# Patient Record
Sex: FEMALE | Race: BLACK OR AFRICAN AMERICAN | NOT HISPANIC OR LATINO
[De-identification: names, ages, dates, MRNs, and addresses within clinical notes are randomized per-mention and may not be internally consistent; named-entity substitution may affect disease eponyms.]

---

## 2019-09-24 ENCOUNTER — RESULT REVIEW (OUTPATIENT)
Age: 69
End: 2019-09-24

## 2020-01-02 ENCOUNTER — EMERGENCY (EMERGENCY)
Facility: HOSPITAL | Age: 70
LOS: 1 days | Discharge: HOME | End: 2020-01-02
Payer: MEDICARE

## 2020-01-02 VITALS
TEMPERATURE: 99 F | SYSTOLIC BLOOD PRESSURE: 152 MMHG | RESPIRATION RATE: 18 BRPM | DIASTOLIC BLOOD PRESSURE: 93 MMHG | OXYGEN SATURATION: 99 %

## 2020-01-02 VITALS
OXYGEN SATURATION: 100 % | TEMPERATURE: 98 F | DIASTOLIC BLOOD PRESSURE: 68 MMHG | HEART RATE: 102 BPM | RESPIRATION RATE: 18 BRPM | SYSTOLIC BLOOD PRESSURE: 159 MMHG

## 2020-01-02 LAB
ALBUMIN SERPL ELPH-MCNC: 4.5 G/DL — SIGNIFICANT CHANGE UP (ref 3.3–5)
ALP SERPL-CCNC: 122 U/L — HIGH (ref 40–120)
ALT FLD-CCNC: 20 U/L — SIGNIFICANT CHANGE UP (ref 4–33)
ANION GAP SERPL CALC-SCNC: 10 MMO/L — SIGNIFICANT CHANGE UP (ref 7–14)
APTT BLD: 39.5 SEC — HIGH (ref 27.5–36.3)
AST SERPL-CCNC: 25 U/L — SIGNIFICANT CHANGE UP (ref 4–32)
BASOPHILS # BLD AUTO: 0.01 K/UL — SIGNIFICANT CHANGE UP (ref 0–0.2)
BASOPHILS NFR BLD AUTO: 0.2 % — SIGNIFICANT CHANGE UP (ref 0–2)
BILIRUB SERPL-MCNC: 0.3 MG/DL — SIGNIFICANT CHANGE UP (ref 0.2–1.2)
BLD GP AB SCN SERPL QL: NEGATIVE — SIGNIFICANT CHANGE UP
BUN SERPL-MCNC: 8 MG/DL — SIGNIFICANT CHANGE UP (ref 7–23)
CALCIUM SERPL-MCNC: 10.3 MG/DL — SIGNIFICANT CHANGE UP (ref 8.4–10.5)
CHLORIDE SERPL-SCNC: 106 MMOL/L — SIGNIFICANT CHANGE UP (ref 98–107)
CO2 SERPL-SCNC: 23 MMOL/L — SIGNIFICANT CHANGE UP (ref 22–31)
CREAT SERPL-MCNC: 0.64 MG/DL — SIGNIFICANT CHANGE UP (ref 0.5–1.3)
EOSINOPHIL # BLD AUTO: 0.33 K/UL — SIGNIFICANT CHANGE UP (ref 0–0.5)
EOSINOPHIL NFR BLD AUTO: 5.2 % — SIGNIFICANT CHANGE UP (ref 0–6)
GLUCOSE SERPL-MCNC: 86 MG/DL — SIGNIFICANT CHANGE UP (ref 70–99)
HCT VFR BLD CALC: 36.3 % — SIGNIFICANT CHANGE UP (ref 34.5–45)
HGB BLD-MCNC: 11.6 G/DL — SIGNIFICANT CHANGE UP (ref 11.5–15.5)
IMM GRANULOCYTES NFR BLD AUTO: 0.3 % — SIGNIFICANT CHANGE UP (ref 0–1.5)
INR BLD: 1.14 — SIGNIFICANT CHANGE UP (ref 0.88–1.17)
LYMPHOCYTES # BLD AUTO: 1.13 K/UL — SIGNIFICANT CHANGE UP (ref 1–3.3)
LYMPHOCYTES # BLD AUTO: 17.7 % — SIGNIFICANT CHANGE UP (ref 13–44)
MCHC RBC-ENTMCNC: 28.9 PG — SIGNIFICANT CHANGE UP (ref 27–34)
MCHC RBC-ENTMCNC: 32 % — SIGNIFICANT CHANGE UP (ref 32–36)
MCV RBC AUTO: 90.5 FL — SIGNIFICANT CHANGE UP (ref 80–100)
MONOCYTES # BLD AUTO: 0.77 K/UL — SIGNIFICANT CHANGE UP (ref 0–0.9)
MONOCYTES NFR BLD AUTO: 12.1 % — SIGNIFICANT CHANGE UP (ref 2–14)
NEUTROPHILS # BLD AUTO: 4.12 K/UL — SIGNIFICANT CHANGE UP (ref 1.8–7.4)
NEUTROPHILS NFR BLD AUTO: 64.5 % — SIGNIFICANT CHANGE UP (ref 43–77)
NRBC # FLD: 0 K/UL — SIGNIFICANT CHANGE UP (ref 0–0)
PLATELET # BLD AUTO: 273 K/UL — SIGNIFICANT CHANGE UP (ref 150–400)
PMV BLD: 11.5 FL — SIGNIFICANT CHANGE UP (ref 7–13)
POTASSIUM SERPL-MCNC: 3.6 MMOL/L — SIGNIFICANT CHANGE UP (ref 3.5–5.3)
POTASSIUM SERPL-SCNC: 3.6 MMOL/L — SIGNIFICANT CHANGE UP (ref 3.5–5.3)
PROT SERPL-MCNC: 7.2 G/DL — SIGNIFICANT CHANGE UP (ref 6–8.3)
PROTHROM AB SERPL-ACNC: 13 SEC — SIGNIFICANT CHANGE UP (ref 9.8–13.1)
RBC # BLD: 4.01 M/UL — SIGNIFICANT CHANGE UP (ref 3.8–5.2)
RBC # FLD: 13.7 % — SIGNIFICANT CHANGE UP (ref 10.3–14.5)
RH IG SCN BLD-IMP: POSITIVE — SIGNIFICANT CHANGE UP
SODIUM SERPL-SCNC: 139 MMOL/L — SIGNIFICANT CHANGE UP (ref 135–145)
WBC # BLD: 6.38 K/UL — SIGNIFICANT CHANGE UP (ref 3.8–10.5)
WBC # FLD AUTO: 6.38 K/UL — SIGNIFICANT CHANGE UP (ref 3.8–10.5)

## 2020-01-02 PROCEDURE — 99283 EMERGENCY DEPT VISIT LOW MDM: CPT | Mod: GC

## 2020-01-02 NOTE — ED PROVIDER NOTE - CLINICAL SUMMARY MEDICAL DECISION MAKING FREE TEXT BOX
68 y/o F presents with bleeding from tongue x 5 days. patient is hemodynamically stable. plan is for routine labs and ENT consult

## 2020-01-02 NOTE — ED PROVIDER NOTE - ENMT, MLM
tongue: small abrasion to midline of the posterior aspect of tongue. actively bleeding. no other lesions noted. Airway patent, Nasal mucosa clear. Mouth with normal mucosa. Throat has no vesicles, no oropharyngeal exudates and uvula is midline.

## 2020-01-02 NOTE — ED PROVIDER NOTE - PATIENT PORTAL LINK FT
You can access the FollowMyHealth Patient Portal offered by [facility] by registering at the following website: [email or web address]. By joining [email or web address]’s FollowMyHealth portal, you will also be able to view your health information using other applications (apps) compatible with our system.

## 2020-01-02 NOTE — CONSULT NOTE ADULT - SUBJECTIVE AND OBJECTIVE BOX
HPI:  Patient is a 69y Female with PMH significant for *** who p/w ***.     h/h 11.6/36.3  Plt 273  PT 13.0  INR 1.14  PTT 39.5    Physical Exam  T(C): 37 (01-02-20 @ 20:58), Max: 37 (01-02-20 @ 20:58)  HR: 102 (01-02-20 @ 15:52) (102 - 102)  BP: 152/93 (01-02-20 @ 20:58) (152/93 - 159/68)  RR: 18 (01-02-20 @ 20:58) (18 - 18)  SpO2: 99% (01-02-20 @ 20:58) (99% - 100%)  General: NAD, A+Ox3  No respiratory distress, stridor, or stertor  Voice quality: normal  Face:  Symmetric without masses or lesions  OU: PERRL, EOMI  AD: Pinna wnl, EAC clear, TM intact, no effusion  AS: Pinna wnl, EAC clear, TM intact, no effusion  Nose: nasal cavity clear bilaterally, inferior turbinates normal, mucosa normal without crusting or bleeding  OC/OP: tongue normal, floor of mouth wnl, no masses or lesions, OP clear  Neck: soft/flat, no LAD  CNII-XII intact    Procedure: Flexible laryngoscopy    Pre-procedure diagnosis/Indication for procedure: To evaluate larynx    Anesthesia: None    Description:    A flexible endoscope was used to examine the left and right nasal cavities, posterior oropharynx, hypopharynx, and larynx. The nasal valve areas were examined for abnormalities or collapse. The inferior and middle turbinates were evaluated. The middle and superior meatuses, the sphenoethmoid recesses, and the nasopharynx were examined and inspected for mucopurulence, polyps, and nasal masses. The oropharynx, tongue base/vallecula, epiglottis, aryepiglottic folds, arytenoids, vocal cords, hypopharynx were also inspected for swelling, inflammation, or dysfunction. The patient tolerated the procedure without complications.    Findings:    NP wnl  BOT/vallecula normal  Epiglottis sharp  AE folds nonedematous  Arytenoids mobile  Vocal folds mobile bilaterally  No masses or lesions visualized in post cricoid space or pyriform sinuses bilaterally HPI:  Patient is a 69y Female with PMH significant for HTN, colitis who p/w bleeding from tongue x 5 days. Was seen by Dr. Mendiola who sent her to ED. States bleeding started after brushing her tongue very hard. States bleeding comes and goes, but sometimes she spits up clots. Denies any additional symptoms. No weight loss, no fever/chills. No hx of smoking or drinking. Was taking ASA but discontinued 2 weeks ago. No hx of bleeding disorder. No respiratory distress, stridor or stertor. No dysphagia.     h/h 11.6/36.3  Plt 273  PT 13.0  INR 1.14  PTT 39.5    Physical Exam  T(C): 37 (01-02-20 @ 20:58), Max: 37 (01-02-20 @ 20:58)  HR: 102 (01-02-20 @ 15:52) (102 - 102)  BP: 152/93 (01-02-20 @ 20:58) (152/93 - 159/68)  RR: 18 (01-02-20 @ 20:58) (18 - 18)  SpO2: 99% (01-02-20 @ 20:58) (99% - 100%)  General: NAD, A+Ox3  No respiratory distress, stridor, or stertor  Voice quality: normal  Face:  Symmetric without masses or lesions  OU: PERRL, EOMI  Nose: nasal cavity clear bilaterally, inferior turbinates normal, mucosa normal without crusting or bleeding  OC/OP: tongue with 2-3mm lesion in midline of posterior tongue, prominent vasculature, violaceous, intermittently oozing, no other lesions of tongue, BOT soft,  floor of mouth wnl, no masses or lesions, OP clear  Neck: soft/flat, no LAD  CNII-XII intact    Procedure: Flexible laryngoscopy    Pre-procedure diagnosis/Indication for procedure: To evaluate larynx    Anesthesia: None    Description:    A flexible endoscope was used to examine the left and right nasal cavities, posterior oropharynx, hypopharynx, and larynx. The nasal valve areas were examined for abnormalities or collapse. The inferior and middle turbinates were evaluated. The middle and superior meatuses, the sphenoethmoid recesses, and the nasopharynx were examined and inspected for mucopurulence, polyps, and nasal masses. The oropharynx, tongue base/vallecula, epiglottis, aryepiglottic folds, arytenoids, vocal cords, hypopharynx were also inspected for swelling, inflammation, or dysfunction. The patient tolerated the procedure without complications.    Findings:  NP wnl  BOT/vallecula normal  Epiglottis sharp  AE folds nonedematous  Arytenoids mobile  Vocal folds mobile bilaterally  No masses or lesions visualized in post cricoid space or pyriform sinuses bilaterally  No additional bleeding noted    PROCEDURE:    Wearing a headlight for visualization, lesion on tongue was cauterized with silver nitrate. Patient was observed for 20 minutes and re-examined. No additional bleeding appreciated. Tolerated procedure well.       A/P: 69 F with bleeding from tongue x 5 day, bleeding violaceous small lesions noted now s/p cautery with silver nitrate. No further bleeding  - No further ORL intervention  - Soft diet x 1 week  - Avoid rigorous brushing of tongue   - Return to ED with bleeding or other concerning symptoms such as respiratory distress  - F/u in clinic, ENT will call patient with appointment date and time, Patient contact [phone]  - d/w dr. montoya

## 2020-01-02 NOTE — ED PROVIDER NOTE - NSFOLLOWUPINSTRUCTIONS_ED_ALL_ED_FT
Return to ED with bleeding or other concerning symptoms such as respiratory distress  - F/u in clinic, ENT will call patient with appointment date and time, Patient contact [phone]

## 2020-01-06 ENCOUNTER — APPOINTMENT (OUTPATIENT)
Dept: OTOLARYNGOLOGY | Facility: CLINIC | Age: 70
End: 2020-01-06
Payer: MEDICARE

## 2020-01-06 VITALS
SYSTOLIC BLOOD PRESSURE: 161 MMHG | HEART RATE: 96 BPM | BODY MASS INDEX: 33.49 KG/M2 | HEIGHT: 65 IN | DIASTOLIC BLOOD PRESSURE: 87 MMHG | WEIGHT: 201 LBS

## 2020-01-06 PROCEDURE — 99204 OFFICE O/P NEW MOD 45 MIN: CPT | Mod: 25

## 2020-01-06 PROCEDURE — 31575 DIAGNOSTIC LARYNGOSCOPY: CPT

## 2020-01-06 RX ORDER — OMEPRAZOLE 20 MG/1
TABLET, DELAYED RELEASE ORAL
Refills: 0 | Status: ACTIVE | COMMUNITY

## 2020-01-06 RX ORDER — MELOXICAM 15 MG/1
TABLET ORAL
Refills: 0 | Status: ACTIVE | COMMUNITY

## 2020-01-06 RX ORDER — MIRABEGRON 50 MG/1
TABLET, FILM COATED, EXTENDED RELEASE ORAL
Refills: 0 | Status: ACTIVE | COMMUNITY

## 2020-01-06 RX ORDER — CHOLECALCIFEROL (VITAMIN D3) 25 MCG
TABLET ORAL
Refills: 0 | Status: ACTIVE | COMMUNITY

## 2020-01-06 RX ORDER — FUROSEMIDE 80 MG/1
TABLET ORAL
Refills: 0 | Status: ACTIVE | COMMUNITY

## 2020-01-06 NOTE — PHYSICAL EXAM
[Midline] : trachea located in midline position [Laryngoscopy Performed] : laryngoscopy was performed, see procedure section for findings [Normal] : no rashes [de-identified] : 4mm midline violaceous posterior tongue lesion, appears to be healing, no bleeding, no underlying mass on palpation, intact tongue mobility [de-identified] : poor dentition [de-identified] : no cyanosis

## 2020-01-06 NOTE — PROCEDURE
[Complicated Symptoms] : complicated symptoms requiring more thorough examination than provided by mirror [Topical Lidocaine] : topical lidocaine [Oxymetazoline HCl] : oxymetazoline HCl [Flexible Endoscope] : examined with the flexible endoscope [Normal] : the false vocal folds were pink and regular, the ventricular sulcus was open, the true vocal folds were glistening white, tense and of equal length, mobility, and height

## 2020-01-06 NOTE — HISTORY OF PRESENT ILLNESS
[de-identified] : 69F seen in the ED 1/2/19 for bleeding from tongue here for follow-up. In the ED, patient reported bleeding from tongue x5 days which had started after brushing her tongue too vigorously. she was evaluated and noted to have a tongue lesion with prominent vasculature which was cauterized with silver nitrate. Since was seen in the ED has had more bleeding daily, sometimes several times a day. Estimates about a teaspoon of blood comes out when she bleeds. Was taking ASA but discontinued > 2 weeks ago. No hx of bleeding disorder. No respiratory distress, stridor or stertor. No dysphagia, odynophagia, otalgia.

## 2020-01-06 NOTE — REASON FOR VISIT
[Initial Evaluation] : an initial evaluation for [Spouse] : spouse [FreeTextEntry2] : Initial visit for tongue laceration with excessive bleeding, went to [facility] ER, unsuccessful cauterization, occurrence for the past week, started on Omeprazole, currently not eating, liquid diet.

## 2020-01-27 ENCOUNTER — APPOINTMENT (OUTPATIENT)
Dept: OTOLARYNGOLOGY | Facility: CLINIC | Age: 70
End: 2020-01-27
Payer: MEDICARE

## 2020-01-27 PROCEDURE — 99213 OFFICE O/P EST LOW 20 MIN: CPT

## 2020-01-27 NOTE — HISTORY OF PRESENT ILLNESS
[de-identified] : 69 year old female follow up for bleeding from tongue.   She reports the bleeding has decreased but still happens every few days.  She reports decreasing her aggressive tongue brushing.  No other acute changes.  No melena, no report of other bleeding problems.

## 2020-01-27 NOTE — PHYSICAL EXAM
[Normal] : assessment of respiratory effort is normal [de-identified] : small central pit with vessel centrally located just anterior to circumvalate papilla

## 2020-02-10 ENCOUNTER — APPOINTMENT (OUTPATIENT)
Dept: OTOLARYNGOLOGY | Facility: CLINIC | Age: 70
End: 2020-02-10
Payer: MEDICARE

## 2020-02-10 VITALS
WEIGHT: 201 LBS | BODY MASS INDEX: 33.49 KG/M2 | SYSTOLIC BLOOD PRESSURE: 148 MMHG | DIASTOLIC BLOOD PRESSURE: 87 MMHG | HEART RATE: 88 BPM | HEIGHT: 65 IN

## 2020-02-10 DIAGNOSIS — K14.8 OTHER DISEASES OF TONGUE: ICD-10-CM

## 2020-02-10 DIAGNOSIS — S01.512A LACERATION W/OUT FOREIGN BODY OF ORAL CAVITY, INITIAL ENCOUNTER: ICD-10-CM

## 2020-02-10 PROCEDURE — 99213 OFFICE O/P EST LOW 20 MIN: CPT

## 2020-02-10 NOTE — PHYSICAL EXAM
[Normal] : parotid gland, submandibular gland and thyroid glands are normal [de-identified] : small central tongue lesion just anterior to circumvallate papilla.  Similar in appearance to prior exam.

## 2020-02-10 NOTE — HISTORY OF PRESENT ILLNESS
[de-identified] : 69 year old female follow up for bleeding from tongue. She reports the bleeding has only happened 3 times since her cautery 2 weeks ago.  She has been trying to avoid brushing tongue and soft foods.  No large volume bleeding.

## 2020-02-10 NOTE — REASON FOR VISIT
[Subsequent Evaluation] : a subsequent evaluation for [Spouse] : spouse [FreeTextEntry2] : follow up for tongue laceration, silver nitrate used during last office visit, electrocautery if needed

## 2020-02-13 ENCOUNTER — APPOINTMENT (OUTPATIENT)
Dept: SURGICAL ONCOLOGY | Facility: CLINIC | Age: 70
End: 2020-02-13

## 2020-02-24 ENCOUNTER — APPOINTMENT (OUTPATIENT)
Dept: OTOLARYNGOLOGY | Facility: CLINIC | Age: 70
End: 2020-02-24
Payer: MEDICARE

## 2020-02-24 DIAGNOSIS — R04.2 HEMOPTYSIS: ICD-10-CM

## 2020-02-24 PROCEDURE — 99213 OFFICE O/P EST LOW 20 MIN: CPT

## 2020-02-24 NOTE — PHYSICAL EXAM
[de-identified] : Midline area of lesion that was previously cauterized was recauterized with AgNO3 no acute bleeding seen [Normal] : no masses and lesions seen, face is symmetric

## 2020-02-24 NOTE — REASON FOR VISIT
[Subsequent Evaluation] : a subsequent evaluation for [Spouse] : spouse [FreeTextEntry2] : tongue laceration

## 2020-03-05 ENCOUNTER — APPOINTMENT (OUTPATIENT)
Dept: SURGICAL ONCOLOGY | Facility: CLINIC | Age: 70
End: 2020-03-05
Payer: MEDICARE

## 2020-03-12 ENCOUNTER — APPOINTMENT (OUTPATIENT)
Dept: OTOLARYNGOLOGY | Facility: CLINIC | Age: 70
End: 2020-03-12
Payer: MEDICARE

## 2020-03-12 VITALS
DIASTOLIC BLOOD PRESSURE: 86 MMHG | SYSTOLIC BLOOD PRESSURE: 129 MMHG | WEIGHT: 125 LBS | BODY MASS INDEX: 20.83 KG/M2 | HEART RATE: 89 BPM | HEIGHT: 65 IN

## 2020-03-12 DIAGNOSIS — K14.8 OTHER DISEASES OF TONGUE: ICD-10-CM

## 2020-03-12 PROCEDURE — 99213 OFFICE O/P EST LOW 20 MIN: CPT

## 2020-03-12 RX ORDER — MESALAMINE 1.2 G/1
TABLET, DELAYED RELEASE ORAL
Refills: 0 | Status: ACTIVE | COMMUNITY

## 2020-03-13 PROBLEM — K14.8 TONGUE LESION: Status: ACTIVE | Noted: 2020-02-10

## 2020-03-13 NOTE — PHYSICAL EXAM
[Normal] : mucosa is normal [de-identified] : Small white scar posterior midline where the tongue was cauterized

## 2020-03-13 NOTE — REASON FOR VISIT
[Subsequent Evaluation] : a subsequent evaluation for [FreeTextEntry2] : follow up for tongue laceration

## 2020-03-13 NOTE — ADDENDUM
[FreeTextEntry1] : I, Vinay Hugo, acted solely as a scribe for Dr. Jono Alatorre on this date, 3/12/2020\par \par All medical record entries made by the Scribe were at my Dr. Alatorre direction and personally dictated by me on 3/12/2020. I have reviewed the chart and agree that the record accurately reflects my personal performance of the history, physical exam, assessment and plan. I have also personally directed, reviewed and agreed with the chart.\par

## 2020-03-13 NOTE — HISTORY OF PRESENT ILLNESS
[de-identified] : 68 y/o female presenting for follow up of tongue bleeding.  Status post cauterization x3 ,states no further bleeding. No further complaints at this time.\par \par

## 2020-03-19 ENCOUNTER — APPOINTMENT (OUTPATIENT)
Dept: SURGICAL ONCOLOGY | Facility: CLINIC | Age: 70
End: 2020-03-19
Payer: MEDICARE

## 2020-03-19 VITALS
TEMPERATURE: 98.3 F | DIASTOLIC BLOOD PRESSURE: 90 MMHG | RESPIRATION RATE: 17 BRPM | OXYGEN SATURATION: 99 % | HEART RATE: 90 BPM | SYSTOLIC BLOOD PRESSURE: 137 MMHG

## 2020-03-19 VITALS — HEIGHT: 65 IN | BODY MASS INDEX: 33.32 KG/M2 | WEIGHT: 200 LBS

## 2020-03-19 DIAGNOSIS — N64.4 MASTODYNIA: ICD-10-CM

## 2020-03-19 DIAGNOSIS — N63.0 UNSPECIFIED LUMP IN UNSPECIFIED BREAST: ICD-10-CM

## 2020-03-19 PROCEDURE — 99204 OFFICE O/P NEW MOD 45 MIN: CPT

## 2020-03-19 RX ORDER — GEMFIBROZIL 600 MG/1
600 TABLET, FILM COATED ORAL
Refills: 0 | Status: ACTIVE | COMMUNITY

## 2020-03-19 NOTE — PHYSICAL EXAM
[Normal] : supple, no neck mass and thyroid not enlarged [Normal Neck Lymph Nodes] : normal neck lymph nodes  [Normal Supraclavicular Lymph Nodes] : normal supraclavicular lymph nodes [Normal Groin Lymph Nodes] : normal groin lymph nodes [Normal Axillary Lymph Nodes] : normal axillary lymph nodes [Normal] : oriented to person, place and time, with appropriate affect [de-identified] : on visual inspection left breast is largeer than right. both breasts are pendulous with evidence of macromastia. on exam no dominant masses appreciated. there is an upper outer quadrant stellate scar in the skin of the left breast

## 2020-03-19 NOTE — ASSESSMENT
[FreeTextEntry1] : 69 year old female presents with left mastodynia, left breast swelling on top of bilateral macromastia. I believe she is symptomatic from her macromastia as her pain includes shoulder, chest wall, neck, and back pain. However given her recent breast swelling I believe a mammogram and sonogram is indicated now. Barring any concerning pathology on breast imaging I will refer her to plastic surgery for consideration of breast reduction surgery given her symptomatic macromastia. \par \par \par Plan: Mammo/sono now\par         PRS referral

## 2020-03-19 NOTE — REASON FOR VISIT
[Initial Consultation] : an initial consultation for [Mastodynia] : mastodynia [FreeTextEntry2] : and macromastia
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
## 2020-03-24 ENCOUNTER — RESULT REVIEW (OUTPATIENT)
Age: 70
End: 2020-03-24

## 2020-03-24 ENCOUNTER — APPOINTMENT (OUTPATIENT)
Dept: MAMMOGRAPHY | Facility: CLINIC | Age: 70
End: 2020-03-24
Payer: MEDICARE

## 2020-03-24 ENCOUNTER — OUTPATIENT (OUTPATIENT)
Dept: OUTPATIENT SERVICES | Facility: HOSPITAL | Age: 70
LOS: 1 days | End: 2020-03-24
Payer: MEDICARE

## 2020-03-24 ENCOUNTER — APPOINTMENT (OUTPATIENT)
Dept: ULTRASOUND IMAGING | Facility: CLINIC | Age: 70
End: 2020-03-24
Payer: MEDICARE

## 2020-03-24 DIAGNOSIS — N63.0 UNSPECIFIED LUMP IN UNSPECIFIED BREAST: ICD-10-CM

## 2020-03-24 PROCEDURE — G0279: CPT | Mod: 26

## 2020-03-24 PROCEDURE — 77065 DX MAMMO INCL CAD UNI: CPT

## 2020-03-24 PROCEDURE — 76641 ULTRASOUND BREAST COMPLETE: CPT | Mod: 26,LT

## 2020-03-24 PROCEDURE — 76641 ULTRASOUND BREAST COMPLETE: CPT

## 2020-03-24 PROCEDURE — 77065 DX MAMMO INCL CAD UNI: CPT | Mod: 26,LT

## 2020-03-24 PROCEDURE — G0279: CPT

## 2020-04-21 ENCOUNTER — APPOINTMENT (OUTPATIENT)
Dept: OTOLARYNGOLOGY | Facility: CLINIC | Age: 70
End: 2020-04-21

## 2020-06-18 ENCOUNTER — APPOINTMENT (OUTPATIENT)
Dept: OTOLARYNGOLOGY | Facility: CLINIC | Age: 70
End: 2020-06-18

## 2020-08-18 ENCOUNTER — APPOINTMENT (OUTPATIENT)
Dept: PLASTIC SURGERY | Facility: CLINIC | Age: 70
End: 2020-08-18
Payer: MEDICARE

## 2020-08-18 VITALS
BODY MASS INDEX: 33.66 KG/M2 | DIASTOLIC BLOOD PRESSURE: 78 MMHG | SYSTOLIC BLOOD PRESSURE: 108 MMHG | HEART RATE: 86 BPM | HEIGHT: 65 IN | TEMPERATURE: 97.7 F | OXYGEN SATURATION: 99 % | WEIGHT: 202 LBS

## 2020-08-18 DIAGNOSIS — Z87.19 PERSONAL HISTORY OF OTHER DISEASES OF THE DIGESTIVE SYSTEM: ICD-10-CM

## 2020-08-18 DIAGNOSIS — Z86.39 PERSONAL HISTORY OF OTHER ENDOCRINE, NUTRITIONAL AND METABOLIC DISEASE: ICD-10-CM

## 2020-08-18 DIAGNOSIS — Z86.79 PERSONAL HISTORY OF OTHER DISEASES OF THE CIRCULATORY SYSTEM: ICD-10-CM

## 2020-08-18 PROCEDURE — 99203 OFFICE O/P NEW LOW 30 MIN: CPT

## 2020-08-19 PROBLEM — Z86.39 HISTORY OF HIGH CHOLESTEROL: Status: RESOLVED | Noted: 2020-01-06 | Resolved: 2020-08-19

## 2020-08-19 PROBLEM — Z87.19 HISTORY OF ULCERATIVE COLITIS: Status: RESOLVED | Noted: 2020-01-06 | Resolved: 2020-08-19

## 2020-08-21 NOTE — HISTORY OF PRESENT ILLNESS
[FreeTextEntry1] : 70 years old pt is here referred by Dr. Tovar for breast reduction consultation. pt reports current bra size is 40 DDD. pt c/o back pain, neck, shoulder pain, and darken pigmentation of IMF but denies any rash development or tension headaches. pt reports pain level 7/10, takes Tylenol for pain.\par \par Most recent b/l MMG from June 2019 was without evidence of malignancy (Birads 2). She later underwent a b/l breast US July 2019 which was also with Birads 2 findings. No cystic or solid lesions were noted at the time of imaging.\par \par pt denies any personal or family Hx of breast cancer.  \par pt denies any cystic or dense breast tissue.\par  pt also denies any s/s of breast pain, nipple discharge, nipple inversion or palpable mass. \par  pt also denies any Hx of blood clotting issues or miscarriages. \par \par

## 2020-08-21 NOTE — PHYSICAL EXAM
[NI] : Normal [Bra Size: _______] : Bra Size: [unfilled] [de-identified] : visual inspection left breast is larger than right. both breasts are pendulous with evidence of macromastia. on exam no dominant masses appreciated. there is an upper outer quadrant stellate scar in the skin of the left breast. \par SNL: 34 (R), 37 (L)\par BW: 15.5 (R), 17 (R)\par N:IMF:  17.5 (R), 15.5 (L)\par Areolar:  6.5 (R), 6.5 (L)

## 2020-12-26 ENCOUNTER — INPATIENT (INPATIENT)
Facility: HOSPITAL | Age: 70
LOS: 4 days | Discharge: HOME | End: 2020-12-31
Attending: HOSPITALIST | Admitting: HOSPITALIST
Payer: MEDICARE

## 2020-12-26 VITALS
HEART RATE: 95 BPM | OXYGEN SATURATION: 100 % | RESPIRATION RATE: 16 BRPM | TEMPERATURE: 98 F | SYSTOLIC BLOOD PRESSURE: 167 MMHG | DIASTOLIC BLOOD PRESSURE: 102 MMHG

## 2020-12-26 DIAGNOSIS — R27.0 ATAXIA, UNSPECIFIED: ICD-10-CM

## 2020-12-26 LAB
APPEARANCE UR: CLEAR — SIGNIFICANT CHANGE UP
B PERT DNA SPEC QL NAA+PROBE: SIGNIFICANT CHANGE UP
BASOPHILS # BLD AUTO: 0.02 K/UL — SIGNIFICANT CHANGE UP (ref 0–0.2)
BASOPHILS NFR BLD AUTO: 0.3 % — SIGNIFICANT CHANGE UP (ref 0–2)
BILIRUB UR-MCNC: NEGATIVE — SIGNIFICANT CHANGE UP
C PNEUM DNA SPEC QL NAA+PROBE: SIGNIFICANT CHANGE UP
COLOR SPEC: COLORLESS — SIGNIFICANT CHANGE UP
DIFF PNL FLD: NEGATIVE — SIGNIFICANT CHANGE UP
EOSINOPHIL # BLD AUTO: 0.14 K/UL — SIGNIFICANT CHANGE UP (ref 0–0.5)
EOSINOPHIL NFR BLD AUTO: 2.3 % — SIGNIFICANT CHANGE UP (ref 0–6)
FLUAV H1 2009 PAND RNA SPEC QL NAA+PROBE: SIGNIFICANT CHANGE UP
FLUAV H1 RNA SPEC QL NAA+PROBE: SIGNIFICANT CHANGE UP
FLUAV H3 RNA SPEC QL NAA+PROBE: SIGNIFICANT CHANGE UP
FLUAV SUBTYP SPEC NAA+PROBE: SIGNIFICANT CHANGE UP
FLUBV RNA SPEC QL NAA+PROBE: SIGNIFICANT CHANGE UP
GLUCOSE UR QL: NEGATIVE — SIGNIFICANT CHANGE UP
HADV DNA SPEC QL NAA+PROBE: SIGNIFICANT CHANGE UP
HCOV PNL SPEC NAA+PROBE: SIGNIFICANT CHANGE UP
HCT VFR BLD CALC: 37.2 % — SIGNIFICANT CHANGE UP (ref 34.5–45)
HGB BLD-MCNC: 11.5 G/DL — SIGNIFICANT CHANGE UP (ref 11.5–15.5)
HMPV RNA SPEC QL NAA+PROBE: SIGNIFICANT CHANGE UP
HPIV1 RNA SPEC QL NAA+PROBE: SIGNIFICANT CHANGE UP
HPIV2 RNA SPEC QL NAA+PROBE: SIGNIFICANT CHANGE UP
HPIV3 RNA SPEC QL NAA+PROBE: SIGNIFICANT CHANGE UP
HPIV4 RNA SPEC QL NAA+PROBE: SIGNIFICANT CHANGE UP
IANC: 3.68 K/UL — SIGNIFICANT CHANGE UP (ref 1.5–8.5)
IMM GRANULOCYTES NFR BLD AUTO: 0.3 % — SIGNIFICANT CHANGE UP (ref 0–1.5)
KETONES UR-MCNC: NEGATIVE — SIGNIFICANT CHANGE UP
LEUKOCYTE ESTERASE UR-ACNC: NEGATIVE — SIGNIFICANT CHANGE UP
LYMPHOCYTES # BLD AUTO: 1.85 K/UL — SIGNIFICANT CHANGE UP (ref 1–3.3)
LYMPHOCYTES # BLD AUTO: 29.9 % — SIGNIFICANT CHANGE UP (ref 13–44)
MCHC RBC-ENTMCNC: 28.2 PG — SIGNIFICANT CHANGE UP (ref 27–34)
MCHC RBC-ENTMCNC: 30.9 GM/DL — LOW (ref 32–36)
MCV RBC AUTO: 91.2 FL — SIGNIFICANT CHANGE UP (ref 80–100)
MONOCYTES # BLD AUTO: 0.47 K/UL — SIGNIFICANT CHANGE UP (ref 0–0.9)
MONOCYTES NFR BLD AUTO: 7.6 % — SIGNIFICANT CHANGE UP (ref 2–14)
NEUTROPHILS # BLD AUTO: 3.68 K/UL — SIGNIFICANT CHANGE UP (ref 1.8–7.4)
NEUTROPHILS NFR BLD AUTO: 59.6 % — SIGNIFICANT CHANGE UP (ref 43–77)
NITRITE UR-MCNC: NEGATIVE — SIGNIFICANT CHANGE UP
NRBC # BLD: 0 /100 WBCS — SIGNIFICANT CHANGE UP
NRBC # FLD: 0 K/UL — SIGNIFICANT CHANGE UP
PH UR: 7 — SIGNIFICANT CHANGE UP (ref 5–8)
PLATELET # BLD AUTO: 211 K/UL — SIGNIFICANT CHANGE UP (ref 150–400)
PROT UR-MCNC: NEGATIVE — SIGNIFICANT CHANGE UP
RAPID RVP RESULT: SIGNIFICANT CHANGE UP
RBC # BLD: 4.08 M/UL — SIGNIFICANT CHANGE UP (ref 3.8–5.2)
RBC # FLD: 15.9 % — HIGH (ref 10.3–14.5)
RSV RNA SPEC QL NAA+PROBE: SIGNIFICANT CHANGE UP
RV+EV RNA SPEC QL NAA+PROBE: SIGNIFICANT CHANGE UP
SARS-COV-2 RNA SPEC QL NAA+PROBE: SIGNIFICANT CHANGE UP
SP GR SPEC: 1.01 — SIGNIFICANT CHANGE UP (ref 1.01–1.02)
UROBILINOGEN FLD QL: SIGNIFICANT CHANGE UP
WBC # BLD: 6.18 K/UL — SIGNIFICANT CHANGE UP (ref 3.8–10.5)
WBC # FLD AUTO: 6.18 K/UL — SIGNIFICANT CHANGE UP (ref 3.8–10.5)

## 2020-12-26 PROCEDURE — 73590 X-RAY EXAM OF LOWER LEG: CPT | Mod: 26,RT

## 2020-12-26 PROCEDURE — 73600 X-RAY EXAM OF ANKLE: CPT | Mod: 26,RT

## 2020-12-26 PROCEDURE — 99285 EMERGENCY DEPT VISIT HI MDM: CPT

## 2020-12-26 PROCEDURE — 70496 CT ANGIOGRAPHY HEAD: CPT | Mod: 26

## 2020-12-26 PROCEDURE — 70498 CT ANGIOGRAPHY NECK: CPT | Mod: 26

## 2020-12-26 NOTE — ED ADULT NURSE NOTE - CHIEF COMPLAINT QUOTE
pt ambulatory to triage with steady gait,  states on Monday morning she woke up "could not stand and was loosing her coordination". As per  pt has also been more forgetful since Monday. Pt AxOx3 in triage. Pt denies CP, SOB, H/A, visual changes, fever.  can be reached at [phone].

## 2020-12-26 NOTE — CONSULT NOTE ADULT - ASSESSMENT
69yo woman with PMH of HTN and colitis presented to the ED after an episode of generalized shaking with persistent confusion for the past 6 days. Patient's  was at bedside to provide collateral information. Patient acknowledges forgetfulness and confusion, in addition to a mild bifrontal headache and increased urinary frequency. Physical exam remarkable for LUE dysmetria, ataxia on heel to shin, and wide-based unsteady gait. Labs remarkable for borderline hypoglycemia. Imaging pending.    Impression: Generalized shaking and poor coordination possibly due R cerebellar dysfunction from ischemic CVA of unknown etiology vs. seizure activity vs. NPH in the setting of urinary frequency.    Recommendations:  [] permissive HTN for 24 hours up to 220/110  [] gradual normotension after 24 hrs  [] telemetry monitoring  [] CTH w/o contrast  [] STAT repeat CTH if change in neuro status  [] 24h vEEG  [] start ASA 81 daily  [] Start atorva 80 mg daily, titrate to LDL <70  [] DVT ppx  [] TTE with bubble  [] CBC, BMP  [] UA/UCx  [] Lipid panel  [] HbA1C  [] BG   [] DVT ppx  [] PT/OT  [] dysphagia screen  [] Fall precautions      Case to be seen and discussed with neurology attending Dr. Jiménez. 71yo woman with PMH of HTN and colitis presented to the ED after an episode of generalized shaking with persistent confusion for the past 6 days. Patient's  was at bedside to provide collateral information. Patient acknowledges forgetfulness and confusion, in addition to a mild bifrontal headache and increased urinary frequency. Physical exam remarkable for slowed cognition, LUE dysmetria, ataxia on heel to shin, and wide-based unsteady gait. Labs remarkable for borderline hypoglycemia. Imaging pending.    Impression: Generalized shaking and poor coordination possibly due R cerebellar dysfunction from ischemic CVA of unknown etiology vs. seizure activity vs. NPH in the setting of urinary frequency.    Recommendations:  [] permissive HTN for 24 hours up to 220/110  [] gradual normotension after 24 hrs  [] telemetry monitoring  [] CTH w/o contrast  [] STAT repeat CTH if change in neuro status  [] 24h vEEG  [] start ASA 81 daily  [] Start atorva 80 mg daily, titrate to LDL <70  [] DVT ppx  [] TTE with bubble  [] CBC, BMP  [] UA/UCx  [] Lipid panel  [] HbA1C  [] BG   [] DVT ppx  [] PT/OT  [] dysphagia screen  [] Fall precautions      Case to be seen and discussed with neurology attending Dr. Jiménez. 71yo woman with PMH of HTN and colitis presented to the ED after an episode of generalized shaking with persistent confusion for the past 6 days. Patient's  was at bedside to provide collateral information. Patient acknowledges forgetfulness and confusion, in addition to a mild bifrontal headache and increased urinary frequency. Physical exam remarkable for slowed cognition, LUE dysmetria, ataxia on heel to shin, and wide-based unsteady gait. Labs remarkable for borderline hypoglycemia. Imaging pending.    Impression: Generalized shaking and poor coordination possibly due R cerebellar dysfunction from ischemic CVA of unknown etiology vs. seizure activity vs. NPH in the setting of urinary frequency.    Recommendations:  [] permissive HTN for 24 hours up to 220/110  [] gradual normotension after 24 hrs  [] telemetry monitoring  [] CTH w/o contrast  [] MRI brain w/o contrast if no contraindication from prior L hand surgery  [] STAT repeat CTH if change in neuro status  [] 24h vEEG  [] start ASA 81 daily  [] Start atorva 80 mg daily, titrate to LDL <70  [] DVT ppx  [] TTE with bubble  [] CBC, BMP  [] UA/UCx  [] Lipid panel  [] HbA1C  [] BG   [] DVT ppx  [] PT/OT  [] dysphagia screen  [] Fall precautions      Case to be seen and discussed with neurology attending Dr. Jiménez.

## 2020-12-26 NOTE — ED ADULT NURSE NOTE - NSIMPLEMENTINTERV_GEN_ALL_ED
Implemented All Fall with Harm Risk Interventions:  Alligator to call system. Call bell, personal items and telephone within reach. Instruct patient to call for assistance. Room bathroom lighting operational. Non-slip footwear when patient is off stretcher. Physically safe environment: no spills, clutter or unnecessary equipment. Stretcher in lowest position, wheels locked, appropriate side rails in place. Provide visual cue, wrist band, yellow gown, etc. Monitor gait and stability. Monitor for mental status changes and reorient to person, place, and time. Review medications for side effects contributing to fall risk. Reinforce activity limits and safety measures with patient and family. Provide visual clues: red socks.

## 2020-12-26 NOTE — ED ADULT NURSE NOTE - OBJECTIVE STATEMENT
Receive pt. in ER room 10 alert and oriented x 4, presenting to the ER with complaints of unsteady gait while walking. Pt. is with her  who stsed " I noticed my wife walking wobbly since Monday and she also had a period whee her conversation was not making sense but then she was normal again". Place on cardiac monitor, labs sent. Pt. ambulating to bathroom with assistance , noted staggering gait, safety maintained, will continue to monitor.

## 2020-12-26 NOTE — CONSULT NOTE ADULT - SUBJECTIVE AND OBJECTIVE BOX
Neurology  Consult Note  12-26-20    Name:  CHRISSY MEJIAS; 70y (1950)    Neurology consult: 69yo woman with PMH of HTN and colitis presented to the ED after an episode of generalized shaking with persistent confusion for the past 6 days. Patient's  was at bedside to provide collateral information. He reports that on 12/21/20 while they were sleeping she had generalized shaking. He thought she was trying to wake him up but felt the episode lasted longer than usual and she wasn't as responsive. He tried to stand her but she was unsteady. She gradually regained consciousness and was able to ambulate to the bathroom, however he noted that she was confused. She slept for 5 hours and afterwards remained confused and was making nonsense (for ex. wanted to deliver one parcel to 2 people at the same time, and was referring to herself in the third person).     After speaking with her PCP she was encouraged to go to the ED. In the ED, physical exam was noted to be remarkable for gait instability and poor coordination however code stroke was not activated as patient was out of window. Patient at this time does not recall events well, but reports a fall in the past month where she hurt her R ankle. She also complains of a 3/10 bifrontal headache, but she reports history of headaches, as well as urinary frequency. She reports mild generalized weakness, but denies numbness, nausea, vomiting, dizziness, difficulty with speech or swallow.    Review of Systems:  As states in HPI.        PMHx:   Colitis      PFHx:   No pertinent family history in first degree relatives      PSuHx:   No significant past surgical history        Medications:  MEDICATIONS  (STANDING):    MEDICATIONS  (PRN):      Vitals:  T(C): 36.6 (12-26-20 @ 15:46), Max: 36.6 (12-26-20 @ 15:46)  HR: 78 (12-26-20 @ 15:46) (78 - 95)  BP: 169/98 (12-26-20 @ 15:46) (167/102 - 169/98)  RR: 17 (12-26-20 @ 15:46) (16 - 17)  SpO2: 100% (12-26-20 @ 15:46) (100% - 100%)    Physical Examination:  Neurologic:  - Mental Status: Alert, awake, oriented to person, place, and time; Speech is slowed but grossly fluent with intact naming, repetition, and comprehension; Follows all commands including cross commands. Immediate recall is 3/3 and delayed recall is 2/3 and then 3/3 with assistance.  - Cranial Nerves:  II: Visual field testing show ?mildly diminished peripheral fields; Pupils are equal, round, and reactive to light;  III, IV, VI: Extraocular movements are intact without nystagmus.  V: Facial sensation is intact in the V1-V3 distribution bilaterally.  VII: Face is symmetric with normal eye closure and smile.  VIII: Hearing is intact to finger rub.  IX, X: Uvula is midline and soft palate rises symmetrically.  XI: Head turning and shoulder shrug are intact.  XII: Tongue protrudes in the midline.  - Motor: Strength is 4/5 with R bicep flexion, 4+/5 with L bicep flexion. 5/5 with hip flexion b/l. There is no pronator drift.  - Reflexes: 2+ and symmetric at the biceps, unable to elicit at the knees.  - Sensory: Intact throughout to light touch  - Coordination: Finger-nose-finger with dysmetria in the L, intact on the R.  Heel-knee-shin slowed and with ataxia on the L, intact on the R. Rapid alternating hand movements diminished in the L.  - Gait: Wide-based unsteady non-ataxic gait. Romberg testing is inconclusive as patient begins to fall but in no particular direction.    Labs:                        11.5   6.18  )-----------( 211      ( 26 Dec 2020 15:17 )             37.2           CAPILLARY BLOOD GLUCOSE  POCT Blood Glucose.: 71 mg/dL (26 Dec 2020 15:18)        Radiology:  Pending Neurology  Consult Note  12-26-20    Name:  CHRISSY MEJIAS; 70y (1950)    Neurology consult: 69yo woman with PMH of HTN and colitis presented to the ED after an episode of generalized shaking with persistent confusion for the past 6 days. Patient's  was at bedside to provide collateral information. He reports that on 12/21/20 while they were sleeping she had generalized shaking. He thought she was trying to wake him up but felt the episode lasted longer than usual and she wasn't as responsive. He tried to stand her but she was unsteady. She gradually regained consciousness and was able to ambulate to the bathroom, however he noted that she was confused. She slept for 5 hours and afterwards remained confused and was making nonsense (for ex. wanted to deliver one parcel to 2 people at the same time, and was referring to herself in the third person).     After speaking with her PCP she was encouraged to go to the ED. In the ED, physical exam was noted to be remarkable for gait instability and poor coordination however code stroke was not activated as patient was out of window. Patient at this time does not recall events well, but reports a fall in the past month where she hurt her R ankle. She also complains of a 3/10 bifrontal headache, but she reports history of headaches, as well as urinary frequency. She reports mild generalized weakness, but denies numbness, nausea, vomiting, dizziness, difficulty with speech or swallow.    Review of Systems:  As states in HPI.        PMHx:   Colitis      PFHx:   No pertinent family history in first degree relatives      PSuHx:   No significant past surgical history        Medications:  MEDICATIONS  (STANDING):    MEDICATIONS  (PRN):      Vitals:  T(C): 36.6 (12-26-20 @ 15:46), Max: 36.6 (12-26-20 @ 15:46)  HR: 78 (12-26-20 @ 15:46) (78 - 95)  BP: 169/98 (12-26-20 @ 15:46) (167/102 - 169/98)  RR: 17 (12-26-20 @ 15:46) (16 - 17)  SpO2: 100% (12-26-20 @ 15:46) (100% - 100%)    Physical Examination:  Neurologic:  - Mental Status: Alert, awake, oriented to person, place, and time; Speech is slowed but grossly fluent with intact naming, repetition, and comprehension; Follows all commands including cross commands however cognition appears slowed. Immediate recall is 3/3 and delayed recall is 2/3 and then 3/3 with assistance. Serial 7 x1.  - Cranial Nerves:  II: Visual field testing show ?mildly diminished peripheral fields; Pupils are equal, round, and reactive to light;  III, IV, VI: Extraocular movements are intact without nystagmus.  V: Facial sensation is intact in the V1-V3 distribution bilaterally.  VII: Face is symmetric with normal eye closure and smile.  VIII: Hearing is intact to finger rub.  IX, X: Uvula is midline and soft palate rises symmetrically.  XI: Head turning and shoulder shrug are intact.  XII: Tongue protrudes in the midline.  - Motor: Strength is 4/5 with R bicep flexion, 4+/5 with L bicep flexion. 5/5 with hip flexion b/l. There is no pronator drift.  - Reflexes: 2+ and symmetric at the biceps, unable to elicit at the knees.  - Sensory: Intact throughout to light touch  - Coordination: Finger-nose-finger with dysmetria in the L, intact on the R.  Heel-knee-shin slowed and with ataxia on the L, intact on the R. Rapid alternating hand movements diminished in the L.  - Gait: Wide-based unsteady non-ataxic gait. Romberg testing is inconclusive as patient begins to fall but in no particular direction.    Labs:                        11.5   6.18  )-----------( 211      ( 26 Dec 2020 15:17 )             37.2           CAPILLARY BLOOD GLUCOSE  POCT Blood Glucose.: 71 mg/dL (26 Dec 2020 15:18)        Radiology:  Pending Neurology  Consult Note  12-26-20    Name:  CHRISSY MEJIAS; 70y (1950)    Neurology consult: 69yo L-handed woman with PMH of HTN and colitis presented to the ED after an episode of generalized shaking with persistent confusion for the past 6 days. Patient's  was at bedside to provide collateral information. He reports that on 12/21/20 while they were sleeping she had generalized shaking. He thought she was trying to wake him up but felt the episode lasted longer than usual and she wasn't as responsive. He tried to stand her but she was unsteady. She gradually regained consciousness and was able to ambulate to the bathroom, however he noted that she was confused. She slept for 5 hours and afterwards remained confused and was making nonsense (for ex. wanted to deliver one parcel to 2 people at the same time, and was referring to herself in the third person).     After speaking with her PCP she was encouraged to go to the ED. In the ED, physical exam was noted to be remarkable for gait instability and poor coordination however code stroke was not activated as patient was out of window. Patient at this time does not recall events well, but reports a fall in the past month where she hurt her R ankle. She also complains of a 3/10 bifrontal headache, but she reports history of headaches, as well as urinary frequency. She reports mild generalized weakness, but denies numbness, nausea, vomiting, dizziness, difficulty with speech or swallow.    Review of Systems:  As states in HPI.        PMHx:   Colitis      PFHx:   No pertinent family history in first degree relatives      PSuHx:   No significant past surgical history        Medications:  MEDICATIONS  (STANDING):    MEDICATIONS  (PRN):      Vitals:  T(C): 36.6 (12-26-20 @ 15:46), Max: 36.6 (12-26-20 @ 15:46)  HR: 78 (12-26-20 @ 15:46) (78 - 95)  BP: 169/98 (12-26-20 @ 15:46) (167/102 - 169/98)  RR: 17 (12-26-20 @ 15:46) (16 - 17)  SpO2: 100% (12-26-20 @ 15:46) (100% - 100%)    Physical Examination:  Neurologic:  - Mental Status: Alert, awake, oriented to person, place, and time; Speech is slowed but grossly fluent with intact naming, repetition, and comprehension; Follows all commands including cross commands however cognition appears slowed. Immediate recall is 3/3 and delayed recall is 2/3 and then 3/3 with assistance. Serial 7 x1.  - Cranial Nerves:  II: Visual field testing show ?mildly diminished peripheral fields; Pupils are equal, round, and reactive to light;  III, IV, VI: Extraocular movements are intact without nystagmus.  V: Facial sensation is intact in the V1-V3 distribution bilaterally.  VII: Face is symmetric with normal eye closure and smile.  VIII: Hearing is intact to finger rub.  IX, X: Uvula is midline and soft palate rises symmetrically.  XI: Head turning and shoulder shrug are intact.  XII: Tongue protrudes in the midline.  - Motor: Strength is 4/5 with R bicep flexion, 4+/5 with L bicep flexion. 5/5 with hip flexion b/l. There is no pronator drift.  - Reflexes: 2+ and symmetric at the biceps, unable to elicit at the knees.  - Sensory: Intact throughout to light touch  - Coordination: Finger-nose-finger with dysmetria in the L, intact on the R.  Heel-knee-shin slowed and with ataxia on the L, intact on the R. Rapid alternating hand movements diminished in the L.  - Gait: Wide-based unsteady non-ataxic gait. Romberg testing is inconclusive as patient begins to fall but in no particular direction.    Labs:                        11.5   6.18  )-----------( 211      ( 26 Dec 2020 15:17 )             37.2           CAPILLARY BLOOD GLUCOSE  POCT Blood Glucose.: 71 mg/dL (26 Dec 2020 15:18)        Radiology:  Pending

## 2020-12-26 NOTE — ED PROVIDER NOTE - ATTENDING CONTRIBUTION TO CARE
Pt was seen and evaluated by me. Pt is a 71 y/o female with PMHx of Colitis who presented to the ED for forgetfulness and difficulty ambulating X 6 days. As per  pt was noted to be shaking and unresponsive at night. Pt was noted to have bit her tongue and took a few minutes before she came to. Pt has since been forgetful and confused. Pt was also noted to have some difficulty walking. When  called PMD they told him to bring her to the ED.  notes pt will at times get headaches. Pt denies any current headache. Denies any vision changes or weakness. Pt denies any fever, chills, nausea, vomiting, SOB, chest pain, or abd pain. Lungs CTA b/l. RRR. Abd soft, non-tender. 5/5 b/l UE and LE. No facial asymmetry. Noted to have difficulty ambulating.  Concern for CVA/Seizure/ICM  Labs, EKG, CT

## 2020-12-26 NOTE — ED PROVIDER NOTE - CLINICAL SUMMARY MEDICAL DECISION MAKING FREE TEXT BOX
71 y/o female with PMHx of Colitis who presented to the ED for forgetfulness and difficulty ambulating X 6 days.    Concern for CVA/Seizure/ICM  Labs, EKG, CT

## 2020-12-26 NOTE — ED PROVIDER NOTE - OBJECTIVE STATEMENT
71 y/o female with PMHx of Colitis who presented to the ED for forgetfulness and difficulty ambulating X 6 days. As per  pt was noted to be shaking and unresponsive at night. Pt was noted to have bit her tongue and took a few minutes before she came to. Pt has since been forgetful and confused. Pt was also noted to have some difficulty walking. When  called PMD they told him to bring her to the ED.  notes pt will at times get headaches. Pt denies any current headache. Denies any vision changes or weakness. Pt denies any fever, chills, nausea, vomiting, SOB, chest pain, or abd pain.

## 2020-12-26 NOTE — ED ADULT TRIAGE NOTE - CHIEF COMPLAINT QUOTE
pt ambulatory to triage with steady gait,  states on Monday morning she woke up "could not stand and was loosing her coordination". As per  pt has also been more forgetful since Monday. Pt AxOx3 in triage. Pt denies CP, SOB, H/A, visual changes, fever.  can be reached at [phone].

## 2020-12-26 NOTE — CONSULT NOTE ADULT - ATTENDING COMMENTS
The case was presented on rounds, the chart and neuro imaging were reviewed and the patient was examined at the bedside in the emergency department.  The patient states that she is now back to her baseline but is poorly insightful as to the reason for her hospitalization.  On today’s examination she is awake, alert and oriented ×3 with somewhat slowed mentation.  Otherwise there is no clear focality on the examination.  The patient may have experienced a nocturnal seizure with postictal confusion.  Awaiting MRI scan of the head and video EEG monitoring.  I do not recommend treatment with antiepileptic drugs at this time.

## 2020-12-27 DIAGNOSIS — Z29.9 ENCOUNTER FOR PROPHYLACTIC MEASURES, UNSPECIFIED: ICD-10-CM

## 2020-12-27 DIAGNOSIS — K52.9 NONINFECTIVE GASTROENTERITIS AND COLITIS, UNSPECIFIED: ICD-10-CM

## 2020-12-27 DIAGNOSIS — Z96.659 PRESENCE OF UNSPECIFIED ARTIFICIAL KNEE JOINT: Chronic | ICD-10-CM

## 2020-12-27 DIAGNOSIS — Z98.890 OTHER SPECIFIED POSTPROCEDURAL STATES: Chronic | ICD-10-CM

## 2020-12-27 DIAGNOSIS — Z87.81 PERSONAL HISTORY OF (HEALED) TRAUMATIC FRACTURE: Chronic | ICD-10-CM

## 2020-12-27 DIAGNOSIS — I10 ESSENTIAL (PRIMARY) HYPERTENSION: ICD-10-CM

## 2020-12-27 DIAGNOSIS — Z02.9 ENCOUNTER FOR ADMINISTRATIVE EXAMINATIONS, UNSPECIFIED: ICD-10-CM

## 2020-12-27 DIAGNOSIS — R27.0 ATAXIA, UNSPECIFIED: ICD-10-CM

## 2020-12-27 LAB
A1C WITH ESTIMATED AVERAGE GLUCOSE RESULT: 5.2 % — SIGNIFICANT CHANGE UP (ref 4–5.6)
ANION GAP SERPL CALC-SCNC: 10 MMOL/L — SIGNIFICANT CHANGE UP (ref 7–14)
BUN SERPL-MCNC: 12 MG/DL — SIGNIFICANT CHANGE UP (ref 7–23)
CALCIUM SERPL-MCNC: 10.3 MG/DL — SIGNIFICANT CHANGE UP (ref 8.4–10.5)
CHLORIDE SERPL-SCNC: 103 MMOL/L — SIGNIFICANT CHANGE UP (ref 98–107)
CHOLEST SERPL-MCNC: 226 MG/DL — HIGH
CO2 SERPL-SCNC: 25 MMOL/L — SIGNIFICANT CHANGE UP (ref 22–31)
CREAT SERPL-MCNC: 0.66 MG/DL — SIGNIFICANT CHANGE UP (ref 0.5–1.3)
CULTURE RESULTS: SIGNIFICANT CHANGE UP
ESTIMATED AVERAGE GLUCOSE: 103 MG/DL — SIGNIFICANT CHANGE UP (ref 68–114)
GLUCOSE SERPL-MCNC: 82 MG/DL — SIGNIFICANT CHANGE UP (ref 70–99)
HCT VFR BLD CALC: 34.8 % — SIGNIFICANT CHANGE UP (ref 34.5–45)
HDLC SERPL-MCNC: 76 MG/DL — SIGNIFICANT CHANGE UP
HGB BLD-MCNC: 11.1 G/DL — LOW (ref 11.5–15.5)
LIPID PNL WITH DIRECT LDL SERPL: 140 MG/DL — HIGH
MAGNESIUM SERPL-MCNC: 2.1 MG/DL — SIGNIFICANT CHANGE UP (ref 1.6–2.6)
MCHC RBC-ENTMCNC: 28.1 PG — SIGNIFICANT CHANGE UP (ref 27–34)
MCHC RBC-ENTMCNC: 31.9 GM/DL — LOW (ref 32–36)
MCV RBC AUTO: 88.1 FL — SIGNIFICANT CHANGE UP (ref 80–100)
NON HDL CHOLESTEROL: 150 MG/DL — HIGH
NRBC # BLD: 0 /100 WBCS — SIGNIFICANT CHANGE UP
NRBC # FLD: 0 K/UL — SIGNIFICANT CHANGE UP
PHOSPHATE SERPL-MCNC: 3.5 MG/DL — SIGNIFICANT CHANGE UP (ref 2.5–4.5)
PLATELET # BLD AUTO: 249 K/UL — SIGNIFICANT CHANGE UP (ref 150–400)
POTASSIUM SERPL-MCNC: 3.8 MMOL/L — SIGNIFICANT CHANGE UP (ref 3.5–5.3)
POTASSIUM SERPL-SCNC: 3.8 MMOL/L — SIGNIFICANT CHANGE UP (ref 3.5–5.3)
RBC # BLD: 3.95 M/UL — SIGNIFICANT CHANGE UP (ref 3.8–5.2)
RBC # FLD: 15.7 % — HIGH (ref 10.3–14.5)
SARS-COV-2 IGG SERPL QL IA: NEGATIVE — SIGNIFICANT CHANGE UP
SARS-COV-2 IGM SERPL IA-ACNC: 0.06 INDEX — SIGNIFICANT CHANGE UP
SODIUM SERPL-SCNC: 138 MMOL/L — SIGNIFICANT CHANGE UP (ref 135–145)
SPECIMEN SOURCE: SIGNIFICANT CHANGE UP
TRIGL SERPL-MCNC: 50 MG/DL — SIGNIFICANT CHANGE UP
TSH SERPL-MCNC: 1.26 UIU/ML — SIGNIFICANT CHANGE UP (ref 0.27–4.2)
WBC # BLD: 5.91 K/UL — SIGNIFICANT CHANGE UP (ref 3.8–10.5)
WBC # FLD AUTO: 5.91 K/UL — SIGNIFICANT CHANGE UP (ref 3.8–10.5)

## 2020-12-27 PROCEDURE — 12345: CPT | Mod: NC

## 2020-12-27 PROCEDURE — 99223 1ST HOSP IP/OBS HIGH 75: CPT | Mod: GC

## 2020-12-27 RX ORDER — ATORVASTATIN CALCIUM 80 MG/1
80 TABLET, FILM COATED ORAL AT BEDTIME
Refills: 0 | Status: DISCONTINUED | OUTPATIENT
Start: 2020-12-27 | End: 2020-12-31

## 2020-12-27 RX ORDER — CELECOXIB 200 MG/1
200 CAPSULE ORAL DAILY
Refills: 0 | Status: DISCONTINUED | OUTPATIENT
Start: 2020-12-27 | End: 2020-12-31

## 2020-12-27 RX ORDER — GEMFIBROZIL 600 MG
600 TABLET ORAL
Refills: 0 | Status: DISCONTINUED | OUTPATIENT
Start: 2020-12-27 | End: 2020-12-31

## 2020-12-27 RX ORDER — ASPIRIN/CALCIUM CARB/MAGNESIUM 324 MG
81 TABLET ORAL DAILY
Refills: 0 | Status: DISCONTINUED | OUTPATIENT
Start: 2020-12-27 | End: 2020-12-31

## 2020-12-27 RX ORDER — PANTOPRAZOLE SODIUM 20 MG/1
40 TABLET, DELAYED RELEASE ORAL
Refills: 0 | Status: DISCONTINUED | OUTPATIENT
Start: 2020-12-27 | End: 2020-12-31

## 2020-12-27 RX ORDER — INFLUENZA VIRUS VACCINE 15; 15; 15; 15 UG/.5ML; UG/.5ML; UG/.5ML; UG/.5ML
0.7 SUSPENSION INTRAMUSCULAR ONCE
Refills: 0 | Status: DISCONTINUED | OUTPATIENT
Start: 2020-12-27 | End: 2020-12-31

## 2020-12-27 RX ORDER — ACETAMINOPHEN 500 MG
650 TABLET ORAL ONCE
Refills: 0 | Status: COMPLETED | OUTPATIENT
Start: 2020-12-27 | End: 2020-12-27

## 2020-12-27 RX ORDER — MESALAMINE 400 MG
1200 TABLET, DELAYED RELEASE (ENTERIC COATED) ORAL DAILY
Refills: 0 | Status: DISCONTINUED | OUTPATIENT
Start: 2020-12-27 | End: 2020-12-27

## 2020-12-27 RX ORDER — MESALAMINE 400 MG
1000 TABLET, DELAYED RELEASE (ENTERIC COATED) ORAL
Refills: 0 | Status: DISCONTINUED | OUTPATIENT
Start: 2020-12-27 | End: 2020-12-31

## 2020-12-27 RX ORDER — CHOLECALCIFEROL (VITAMIN D3) 125 MCG
1000 CAPSULE ORAL DAILY
Refills: 0 | Status: DISCONTINUED | OUTPATIENT
Start: 2020-12-27 | End: 2020-12-31

## 2020-12-27 RX ORDER — INFLUENZA VIRUS VACCINE 15; 15; 15; 15 UG/.5ML; UG/.5ML; UG/.5ML; UG/.5ML
0.5 SUSPENSION INTRAMUSCULAR ONCE
Refills: 0 | Status: DISCONTINUED | OUTPATIENT
Start: 2020-12-27 | End: 2020-12-27

## 2020-12-27 RX ORDER — MIRABEGRON 50 MG/1
25 TABLET, EXTENDED RELEASE ORAL DAILY
Refills: 0 | Status: DISCONTINUED | OUTPATIENT
Start: 2020-12-27 | End: 2020-12-31

## 2020-12-27 RX ORDER — ENOXAPARIN SODIUM 100 MG/ML
40 INJECTION SUBCUTANEOUS DAILY
Refills: 0 | Status: DISCONTINUED | OUTPATIENT
Start: 2020-12-27 | End: 2020-12-31

## 2020-12-27 RX ADMIN — Medication 650 MILLIGRAM(S): at 07:24

## 2020-12-27 RX ADMIN — Medication 1000 MILLIGRAM(S): at 17:53

## 2020-12-27 RX ADMIN — Medication 1000 UNIT(S): at 11:34

## 2020-12-27 RX ADMIN — MIRABEGRON 25 MILLIGRAM(S): 50 TABLET, EXTENDED RELEASE ORAL at 11:34

## 2020-12-27 RX ADMIN — CELECOXIB 200 MILLIGRAM(S): 200 CAPSULE ORAL at 11:34

## 2020-12-27 RX ADMIN — Medication 600 MILLIGRAM(S): at 17:53

## 2020-12-27 RX ADMIN — Medication 1000 MILLIGRAM(S): at 11:34

## 2020-12-27 RX ADMIN — ENOXAPARIN SODIUM 40 MILLIGRAM(S): 100 INJECTION SUBCUTANEOUS at 11:34

## 2020-12-27 RX ADMIN — PANTOPRAZOLE SODIUM 40 MILLIGRAM(S): 20 TABLET, DELAYED RELEASE ORAL at 07:25

## 2020-12-27 RX ADMIN — Medication 81 MILLIGRAM(S): at 11:34

## 2020-12-27 RX ADMIN — Medication 1000 MILLIGRAM(S): at 07:25

## 2020-12-27 RX ADMIN — Medication 600 MILLIGRAM(S): at 07:24

## 2020-12-27 RX ADMIN — Medication 1000 MILLIGRAM(S): at 23:15

## 2020-12-27 RX ADMIN — ATORVASTATIN CALCIUM 80 MILLIGRAM(S): 80 TABLET, FILM COATED ORAL at 23:15

## 2020-12-27 NOTE — H&P ADULT - NSICDXPASTSURGICALHX_GEN_ALL_CORE_FT
PAST SURGICAL HISTORY:  H/O finger fracture L hand pinning to 2nfd nd 3rd finger.    H/O myomectomy     S/P knee replacement Left

## 2020-12-27 NOTE — H&P ADULT - PROBLEM SELECTOR PLAN 3
BP = 143/ 84.  Hold BP meds for now due to permissive HTN for 24 hours. BP = 143/ 84.  Hold BP meds for now due to permissive HTN for 24 hours.  f/u BMP (initial labs not resulted)

## 2020-12-27 NOTE — H&P ADULT - ASSESSMENT
70F with HTN, Colitis admitted for generalized shaking and poor coordination possibly due R cerebellar dysfunction from ischemic CVA of unknown etiology vs. seizure activity vs. NPH in the setting of urinary frequency.  Neurology consult appreciated

## 2020-12-27 NOTE — H&P ADULT - PROBLEM SELECTOR PLAN 1
Cardiac monitor.  Passed bedside dysphagia.  Dysphagia 2 nectar thick fluid.  Aspiration, safety, fall, seizure precautions.  Give ASA 81mg, Lipitor 80mg and titrate for LDL < 70.  Permissive HTN for 24 hours up to 220/110 and gradual normotension after 24 hrs.  F/U PT, OT, PM &R, S &S.  F/U TTE & Bubble study.  F/U Lipid panel, TSH, A1C.  Unable to do MRI due to pinning of 2n3 & 3rd digits of L UE. Cardiac monitor.  Passed bedside dysphagia.  Dysphagia 2 nectar thick fluid.  Aspiration, safety, fall, seizure precautions.  Give ASA 81mg, Lipitor 80mg and titrate for LDL < 70.  Permissive HTN for 24 hours up to 220/110 and gradual normotension after 24 hrs.  F/U PT, OT, PM &R, S &S.  F/U TTE & Bubble study.  F/U Lipid panel, TSH, A1C.  STAT repeat CTH if change in neuro status.  Unable to do MRI due to pinning of 2n3 & 3rd digits of L UE. with hx of shaking and persistent confusion. differential includes possivle seizure vs CVA vs NPH? Currently Aox4. cont. Cardiac monitor.  Passed bedside dysphagia.  Dysphagia 2 nectar thick fluid.  Aspiration, safety, fall, seizure precautions.  Give ASA 81mg, Lipitor 80mg and titrate for LDL < 70.  Permissive HTN for 24 hours up to 220/110 and gradual normotension after 24 hrs.  F/U PT, OT, PM &R, S &S.  F/U TTE & Bubble study.  F/U Lipid panel, TSH, A1C.  STAT repeat CTH if change in neuro status.  probably unable to do MRI due to pinning of 2n3 & 3rd digits of L UE. Confirm with neuro this Am as no mention in consult

## 2020-12-27 NOTE — H&P ADULT - NSICDXPASTMEDICALHX_GEN_ALL_CORE_FT
PAST MEDICAL HISTORY:  Colitis     H/O finger fracture L hand Pinning to 2 and 3rd fingers.    Hypertension

## 2020-12-27 NOTE — PROGRESS NOTE ADULT - PROBLEM SELECTOR PLAN 1
with hx of shaking, confusion and [place]. differential includes possivle seizure vs CVA vs NPH? Currently Aox4. cont. Cardiac monitor.  Give ASA 81mg, Lipitor 80mg and titrate for LDL < 70.  Permissive HTN for 24 hours up to 220/110 and gradual normotension after 24 hrs.  F/U PT, OT, PM &R, S &S.  F/U TTE & Bubble study.  STAT repeat CTH if change in neuro status. with hx of shaking, confusion and [place]. differential includes possivle seizure vs CVA vs NPH? Currently Aox4. cont. Cardiac monitor.  Give ASA 81mg, Lipitor 80mg and titrate for LDL < 70.  Permissive HTN for 24 hours up to 220/110 and gradual normotension after 24 hrs.  F/U TTE & Bubble study.  STAT repeat CTH if change in neuro status.  Discuss with neuro need for MRI given patient had previous hand surgery with metal.

## 2020-12-27 NOTE — H&P ADULT - NSHPSOCIALHISTORY_GEN_ALL_CORE
.  Lives with the spouse.  Denies Nicotine.  Denies ETOH.  Denies illicit/ recreational drug use.  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  Colonoscopy 2020 Normal.  Mammogram 2020. Normal.  Flu Vax : Denies.

## 2020-12-27 NOTE — H&P ADULT - GASTROINTESTINAL DETAILS
soft/nontender/no distention/no masses palpable/bowel sounds normal/no bruit/no rebound tenderness/no rigidity

## 2020-12-27 NOTE — H&P ADULT - MUSCULOSKELETAL
details… no joint swelling/no joint erythema/no joint warmth/no calf tenderness/normal strength detailed exam

## 2020-12-27 NOTE — H&P ADULT - PROBLEM SELECTOR PLAN 5
1.  Name of PCP: Nakul Brown   2.  PCP Contacted on Admission: [ ] Y    [X] N    3.  PCP contacted at Discharge: [ ] Y    [ ] N    [ ] N/A  4.  Post-Discharge Appointment Date and Location:  5.  Summary of Handoff given to PCP:

## 2020-12-27 NOTE — PROGRESS NOTE ADULT - PROBLEM SELECTOR PLAN 4
VTE with Lovenox 40 mg sub cut daily.  Fall, Aspiration, safety and seizure precautions.  Dysphagia 2 diet.

## 2020-12-27 NOTE — PHYSICAL THERAPY INITIAL EVALUATION ADULT - PERTINENT HX OF CURRENT PROBLEM, REHAB EVAL
Patient is 70 year old female admitted with history of HTN, colitis, left hand index and middle finger fracture, presents with shaking and confusion.

## 2020-12-27 NOTE — H&P ADULT - HISTORY OF PRESENT ILLNESS
70F, self ambulating, history of HTN, colitis, L hand with index and middle finger fractures and pins placed, experiencing generalized shaking with persistent confusion for the past 6 days. The pt says it is hard for her to recall the events that brought her to the ED and that her  is able to describe the events. The pt admits to fall 2 months ago and sustaining a R ankle fracture, L Parietal CALDERON she describes as a discomfort and constipation, with last BM being 12/25. The patient's , who is currently not present, reported on 12/21/20 while they were sleeping, the pt had generalized shaking and was unresponsive. Pt was noted to have bit her tongue and took a few minutes before she came too. She gradually regained consciousness and was able to ambulate to the bathroom, however he noted that she was confused and not making sense when speaking. The  called the PCP was advised to take the pt to the ED. The pt denies dizziness, blurry vision, dysarthria, dyspnea, cough, chest pain, palpitations, nausea, vomit, diarrhea, abdominal pain, dysuria, chills, generalized body aches.      In the Ed, the pt was seen by neurology. Their consult and recommendations are in the chart.   -CT HEAD: No acute intracranial bleeding. Chronic microvascular ischemic changes.  -CTA BRAIN: Generalized ectasia along the [place], notably of the ICAs, MCAs, and vertebrobasilar system, may reflect uncontrolled hypertension or vasculopathy. No flow-limiting stenosis or occlusion.  -CTA NECK: TA NECK: Patent cervical vasculature. No flow limiting stenosis or occlusion.  -XRAY R Tib & Fib preliminary = no emergent findings.  -XRAY R Ankle preliminary =Cortical step off along the distal aspect of the fifth metatarsal best seen on the frontal view of the right foot which may represent a chronic fracture. Recommend correlation with point tenderness and focused radiographs of the right foot if there is concern for acute fracture in that region.  -EKG NSR @ 83b/ min, QT/ QTC= 382/ 448.  -COVID PCR Not detected.     Vitals : T max = 98* oral, HR= 81b/ min, BP = 143/ 84, RR= 16b/ min, SPO2= 100%ra

## 2020-12-27 NOTE — H&P ADULT - NEGATIVE NEUROLOGICAL SYMPTOMS
no transient paralysis/no weakness/no paresthesias/no syncope/no vertigo/no loss of sensation/no loss of consciousness/no hemiparesis/no facial palsy

## 2020-12-27 NOTE — H&P ADULT - NSHPLABSRESULTS_GEN_ALL_CORE
-CTA BRAIN: Generalized ectasia along the [place], notably of the ICAs, MCAs, and vertebrobasilar system, may reflect uncontrolled hypertension or vasculopathy. No flow-limiting stenosis or occlusion.  -CTA NECK: TA NECK: Patent cervical vasculature. No flow limiting stenosis or occlusion.  -XRAY R Tib & Fib preliminary = no emergent findings.  -XRAY R Ankle preliminary =Cortical step off along the distal aspect of the fifth metatarsal best seen on the frontal view of the right foot which may represent a chronic fracture. Recommend correlation with point tenderness and focused radiographs of the right foot if there is concern for acute fracture in that region.  -EKG NSR @ 83b/ min, QT/ QTC= 382/ 448.  -COVID PCR Not detected.                             11.5   6.18  )-----------( 211      ( 26 Dec 2020 15:17 )             37.2

## 2020-12-27 NOTE — PROGRESS NOTE ADULT - SUBJECTIVE AND OBJECTIVE BOX
Patient is a 70y old  Female who presents with a chief complaint of Confusion x 6 days (27 Dec 2020 04:11)    SUBJECTIVE / OVERNIGHT EVENTS: No acute events since admission. Reported HA in the am.    MEDICATIONS  (STANDING):  aspirin enteric coated 81 milliGRAM(s) Oral daily  atorvastatin 80 milliGRAM(s) Oral at bedtime  celecoxib 200 milliGRAM(s) Oral daily  cholecalciferol 1000 Unit(s) Oral daily  enoxaparin Injectable 40 milliGRAM(s) SubCutaneous daily  gemfibrozil 600 milliGRAM(s) Oral two times a day  mesalamine ER Capsule 1000 milliGRAM(s) Oral four times a day  mirabegron ER 25 milliGRAM(s) Oral daily  pantoprazole    Tablet 40 milliGRAM(s) Oral before breakfast    MEDICATIONS  (PRN):      CAPILLARY BLOOD GLUCOSE      POCT Blood Glucose.: 71 mg/dL (26 Dec 2020 15:18)  POCT Blood Glucose.: 69 mg/dL (26 Dec 2020 15:16)    I&O's Summary      T(C): 36.6 (20 @ 05:25), Max: 36.7 (20 @ 21:04)  HR: 77 (20 @ 12:39) (70 - 81)  BP: 148/96 (20 @ 12:39) (143/84 - 169/98)  RR: 16 (20 @ 12:39) (16 - 17)  SpO2: 99% (20 @ 12:39) (99% - 100%)  PHYSICAL EXAM:  GENERAL: NAD, well-developed  HEAD:  Atraumatic, Normocephalic  EYES: EOMI, PERRLA, conjunctiva and sclera clear  NECK: Supple, No JVD  CHEST/LUNG: Clear to auscultation bilaterally; No wheeze  HEART: Regular rate and rhythm; No murmurs, rubs, or gallops  ABDOMEN: Soft, Nontender, Nondistended; Bowel sounds present  EXTREMITIES:  2+ Peripheral Pulses, No clubbing, cyanosis, or edema  PSYCH: AAOx3  NEUROLOGY: non-focal  SKIN: No rashes or lesions    LABS:                        11.1   5.91  )-----------( 249      ( 27 Dec 2020 06:04 )             34.8     WBC Trend: 5.91<--, 6.18<--      138  |  103  |  12  ----------------------------<  82  3.8   |  25  |  0.66    Ca    10.3      27 Dec 2020 06:04  Phos  3.5       Mg     2.1           Creatinine Trend: 0.66<--        Urinalysis Basic - ( 26 Dec 2020 16:12 )    Color: Colorless / Appearance: Clear / S.010 / pH: x  Gluc: x / Ketone: Negative  / Bili: Negative / Urobili: <2 mg/dL   Blood: x / Protein: Negative / Nitrite: Negative   Leuk Esterase: Negative / RBC: x / WBC x   Sq Epi: x / Non Sq Epi: x / Bacteria: x

## 2020-12-28 LAB
ANION GAP SERPL CALC-SCNC: 10 MMOL/L — SIGNIFICANT CHANGE UP (ref 7–14)
BUN SERPL-MCNC: 14 MG/DL — SIGNIFICANT CHANGE UP (ref 7–23)
CALCIUM SERPL-MCNC: 9.9 MG/DL — SIGNIFICANT CHANGE UP (ref 8.4–10.5)
CHLORIDE SERPL-SCNC: 104 MMOL/L — SIGNIFICANT CHANGE UP (ref 98–107)
CO2 SERPL-SCNC: 23 MMOL/L — SIGNIFICANT CHANGE UP (ref 22–31)
CREAT SERPL-MCNC: 0.75 MG/DL — SIGNIFICANT CHANGE UP (ref 0.5–1.3)
GLUCOSE SERPL-MCNC: 87 MG/DL — SIGNIFICANT CHANGE UP (ref 70–99)
HCT VFR BLD CALC: 33.4 % — LOW (ref 34.5–45)
HCV AB S/CO SERPL IA: 0.13 S/CO — SIGNIFICANT CHANGE UP (ref 0–0.99)
HCV AB SERPL-IMP: SIGNIFICANT CHANGE UP
HGB BLD-MCNC: 10.7 G/DL — LOW (ref 11.5–15.5)
MAGNESIUM SERPL-MCNC: 2.1 MG/DL — SIGNIFICANT CHANGE UP (ref 1.6–2.6)
MCHC RBC-ENTMCNC: 28.3 PG — SIGNIFICANT CHANGE UP (ref 27–34)
MCHC RBC-ENTMCNC: 32 GM/DL — SIGNIFICANT CHANGE UP (ref 32–36)
MCV RBC AUTO: 88.4 FL — SIGNIFICANT CHANGE UP (ref 80–100)
NRBC # BLD: 0 /100 WBCS — SIGNIFICANT CHANGE UP
NRBC # FLD: 0 K/UL — SIGNIFICANT CHANGE UP
PHOSPHATE SERPL-MCNC: 3.6 MG/DL — SIGNIFICANT CHANGE UP (ref 2.5–4.5)
PLATELET # BLD AUTO: 248 K/UL — SIGNIFICANT CHANGE UP (ref 150–400)
POTASSIUM SERPL-MCNC: 3.6 MMOL/L — SIGNIFICANT CHANGE UP (ref 3.5–5.3)
POTASSIUM SERPL-SCNC: 3.6 MMOL/L — SIGNIFICANT CHANGE UP (ref 3.5–5.3)
RBC # BLD: 3.78 M/UL — LOW (ref 3.8–5.2)
RBC # FLD: 15.7 % — HIGH (ref 10.3–14.5)
SODIUM SERPL-SCNC: 137 MMOL/L — SIGNIFICANT CHANGE UP (ref 135–145)
WBC # BLD: 5.48 K/UL — SIGNIFICANT CHANGE UP (ref 3.8–10.5)
WBC # FLD AUTO: 5.48 K/UL — SIGNIFICANT CHANGE UP (ref 3.8–10.5)

## 2020-12-28 PROCEDURE — 99233 SBSQ HOSP IP/OBS HIGH 50: CPT

## 2020-12-28 PROCEDURE — 73620 X-RAY EXAM OF FOOT: CPT | Mod: 26,RT

## 2020-12-28 PROCEDURE — 99222 1ST HOSP IP/OBS MODERATE 55: CPT

## 2020-12-28 PROCEDURE — 93306 TTE W/DOPPLER COMPLETE: CPT | Mod: 26

## 2020-12-28 RX ORDER — LIDOCAINE 4 G/100G
1 CREAM TOPICAL THREE TIMES A DAY
Refills: 0 | Status: DISCONTINUED | OUTPATIENT
Start: 2020-12-28 | End: 2020-12-31

## 2020-12-28 RX ADMIN — Medication 1000 MILLIGRAM(S): at 06:30

## 2020-12-28 RX ADMIN — CELECOXIB 200 MILLIGRAM(S): 200 CAPSULE ORAL at 13:06

## 2020-12-28 RX ADMIN — Medication 1000 UNIT(S): at 13:06

## 2020-12-28 RX ADMIN — Medication 600 MILLIGRAM(S): at 06:30

## 2020-12-28 RX ADMIN — ATORVASTATIN CALCIUM 80 MILLIGRAM(S): 80 TABLET, FILM COATED ORAL at 22:11

## 2020-12-28 RX ADMIN — PANTOPRAZOLE SODIUM 40 MILLIGRAM(S): 20 TABLET, DELAYED RELEASE ORAL at 06:30

## 2020-12-28 RX ADMIN — Medication 1000 MILLIGRAM(S): at 13:06

## 2020-12-28 RX ADMIN — Medication 1000 MILLIGRAM(S): at 17:21

## 2020-12-28 RX ADMIN — Medication 81 MILLIGRAM(S): at 13:06

## 2020-12-28 RX ADMIN — MIRABEGRON 25 MILLIGRAM(S): 50 TABLET, EXTENDED RELEASE ORAL at 13:06

## 2020-12-28 RX ADMIN — ENOXAPARIN SODIUM 40 MILLIGRAM(S): 100 INJECTION SUBCUTANEOUS at 13:06

## 2020-12-28 RX ADMIN — Medication 600 MILLIGRAM(S): at 17:21

## 2020-12-28 NOTE — OCCUPATIONAL THERAPY INITIAL EVALUATION ADULT - PERTINENT HX OF CURRENT PROBLEM, REHAB EVAL
----- Message from Akosua Garcia sent at 2020 10:16 AM CDT -----  [phone]/self  Type:  Patient Returning Call    Who Called: self  Who Left Message for Patient: ?  Does the patient know what this is regarding?: results  Would the patient rather a call back or a response via [employer]?  call  Best Call Back Number: Wife [phone]  Additional Information:         70F with HTN, Colitis admitted for generalized shaking and poor coordination possibly due R cerebellar dysfunction from ischemic CVA of unknown etiology vs. seizure activity vs. NPH in the setting of urinary frequency. Pt is a 71 y/o Female with HTN, Colitis admitted for generalized shaking and poor coordination possibly due R cerebellar dysfunction from ischemic CVA of unknown etiology vs. seizure activity vs. NPH in the setting of urinary frequency.

## 2020-12-28 NOTE — CHART NOTE - NSCHARTNOTEFT_GEN_A_CORE
pinning of 2n3 & 3rd digits of L UE: pt had had MRI since then d/w MRI tech ok to order MRI  d/w  in agreement with plan pinning of 2n3 & 3rd digits of L UE: pt had had MRI since then d/w MRI tech ok to order MRI  d/w  in agreement with plan.  MRI expedited with MRI tech

## 2020-12-28 NOTE — SWALLOW BEDSIDE ASSESSMENT ADULT - COMMENTS
70F, self ambulating, history of HTN, colitis, L hand with index and middle finger fractures and pins placed, experiencing generalized shaking with persistent confusion for the past 6 days.  possibly due R cerebellar dysfunction from ischemic CVA of unknown etiology vs. seizure activity vs. NPH in the setting of urinary frequency.    The patient was seen this date for swallow evaluation, RN reporting no difficulty noted. Patient is alert and oriented, cooperative and following commands throughout the evaluation.  -CT HEAD: No acute intracranial bleeding. Chronic microvascular ischemic changes.

## 2020-12-28 NOTE — PROGRESS NOTE ADULT - SUBJECTIVE AND OBJECTIVE BOX
[facility] Medicine  Pager [phone]    Patient is a 70y old  Female who presents with a chief complaint of Confusion x 6 days (28 Dec 2020 11:22)      INTERVAL HPI/OVERNIGHT EVENTS:    MEDICATIONS  (STANDING):  aspirin enteric coated 81 milliGRAM(s) Oral daily  atorvastatin 80 milliGRAM(s) Oral at bedtime  celecoxib 200 milliGRAM(s) Oral daily  cholecalciferol 1000 Unit(s) Oral daily  enoxaparin Injectable 40 milliGRAM(s) SubCutaneous daily  gemfibrozil 600 milliGRAM(s) Oral two times a day  influenza  Vaccine (HIGH DOSE) 0.7 milliLiter(s) IntraMuscular once  mesalamine ER Capsule 1000 milliGRAM(s) Oral four times a day  mirabegron ER 25 milliGRAM(s) Oral daily  pantoprazole    Tablet 40 milliGRAM(s) Oral before breakfast    MEDICATIONS  (PRN):      Allergies    No Known Allergies    Intolerances        REVIEW OF SYSTEMS:  Please see interval HPI:    Vital Signs Last 24 Hrs  T(C): 37.1 (28 Dec 2020 06:29), Max: 37.3 (27 Dec 2020 20:35)  T(F): 98.8 (28 Dec 2020 06:29), Max: 99.1 (27 Dec 2020 20:35)  HR: 62 (28 Dec 2020 06:29) (62 - 84)  BP: 126/66 (28 Dec 2020 06:29) (120/80 - 138/86)  BP(mean): --  RR: 18 (28 Dec 2020 06:29) (15 - 18)  SpO2: 100% (28 Dec 2020 06:29) (99% - 100%)  I&O's Detail        PHYSICAL EXAM:  GENERAL:   HEAD:    EYES:   ENMT:   NECK:   NERVOUS SYSTEM:    CHEST/LUNG:   HEART:   ABDOMEN:   EXTREMITIES:    LYMPH:   SKIN:     LABS:                        10.7   5.48  )-----------( 248      ( 28 Dec 2020 06:56 )             33.4     28 Dec 2020 06:56    137    |  104    |  14     ----------------------------<  87     3.6     |  23     |  0.75     Ca    9.9        28 Dec 2020 06:56  Phos  3.6       28 Dec 2020 06:56  Mg     2.1       28 Dec 2020 06:56        CAPILLARY BLOOD GLUCOSE        BLOOD CULTURE  12-26 @ 16:01   <10,000 CFU/mL Normal Urogenital Jennifer  --  --    RADIOLOGY & ADDITIONAL TESTS:    Imaging Personally Reviewed:  [ ] YES     Consultant(s) Notes Reviewed:      Care Discussed with Consultants/Other Providers: [facility]  Pager [phone]    Patient is a 70y old  Female who presents with a chief complaint of Confusion x 6 days (28 Dec 2020 11:22)      INTERVAL HPI/OVERNIGHT EVENTS:  Patient reports feeling much better than before, states she couldn't remember what her  was telling her, now denies confusion, coordination has also improved. Looking forward to lunch, occasionally has LBP radiating down R leg, no other complaints at this time. Has history of fractures in hand s/p pinning, states has gotten MRIs since then.     MEDICATIONS  (STANDING):  aspirin enteric coated 81 milliGRAM(s) Oral daily  atorvastatin 80 milliGRAM(s) Oral at bedtime  celecoxib 200 milliGRAM(s) Oral daily  cholecalciferol 1000 Unit(s) Oral daily  enoxaparin Injectable 40 milliGRAM(s) SubCutaneous daily  gemfibrozil 600 milliGRAM(s) Oral two times a day  influenza  Vaccine (HIGH DOSE) 0.7 milliLiter(s) IntraMuscular once  mesalamine ER Capsule 1000 milliGRAM(s) Oral four times a day  mirabegron ER 25 milliGRAM(s) Oral daily  pantoprazole    Tablet 40 milliGRAM(s) Oral before breakfast    MEDICATIONS  (PRN):    Allergies  No Known Allergies    Intolerances    REVIEW OF SYSTEMS:  Please see interval HPI:    Vital Signs Last 24 Hrs  T(C): 37.1 (28 Dec 2020 06:29), Max: 37.3 (27 Dec 2020 20:35)  T(F): 98.8 (28 Dec 2020 06:29), Max: 99.1 (27 Dec 2020 20:35)  HR: 62 (28 Dec 2020 06:29) (62 - 84)  BP: 126/66 (28 Dec 2020 06:29) (120/80 - 138/86)  BP(mean): --  RR: 18 (28 Dec 2020 06:29) (15 - 18)  SpO2: 100% (28 Dec 2020 06:29) (99% - 100%)  I&O's Detail    PHYSICAL EXAM:  GENERAL: NAD, sitting on edge of bed  HEAD:  NC/AT  EYES: EOMI, clear sclera/conjunctiva  ENMT: MMM  NECK: supple  NERVOUS SYSTEM: moving extremities, able to do heel to shin, rapid alternating movings (hands)   CHEST/LUNG: CTAB, comfortable on RA, speaking in full sentences  HEART: S1S2 RRR  ABDOMEN: soft, non-tender  EXTREMITIES:  no c/c/e      LABS:                        10.7   5.48  )-----------( 248      ( 28 Dec 2020 06:56 )             33.4     28 Dec 2020 06:56    137    |  104    |  14     ----------------------------<  87     3.6     |  23     |  0.75     Ca    9.9        28 Dec 2020 06:56  Phos  3.6       28 Dec 2020 06:56  Mg     2.1       28 Dec 2020 06:56    CAPILLARY BLOOD GLUCOSE    URINE CULTURE  12-26 @ 16:01   <10,000 CFU/mL Normal Urogenital Jennifer  --  --    RADIOLOGY & ADDITIONAL TESTS:    Imaging Personally Reviewed:  [x ] YES   < from: Xray Foot AP + Lateral, Right (12.28.20 @ 11:46) >  IMPRESSION:  Old healed distal 5th metatarsal fracture deformity.    Thin linear sliver of ossific density adjacent to anterolateral calcaneal cortex suspicious for an avulsion fracture at the extensor digitorum brevis tendon attachment site, correlate for point tenderness at mid point between lateral malleolus and 5th metatarsal tuberosity. No dislocations or additional suspected fractures.    Tarsometatarsal alignment maintained without evidence for a Lisfranc injury.    Appearance of an old 5th PIP joint arthroplasty defect with well corticated articular margins. Preserved remaining joint spaces and no joint margin erosions.    Tiny plantar calcaneal enthesophyte.    Generalized mild osteopenia otherwise no discrete lytic or blastic lesions.    < end of copied text >  < from: CT Angio Neck w/ IV Cont (12.26.20 @ 19:54) >  IMPRESSION:    CT HEAD: No acute intracranial bleeding. Chronic microvascular ischemic changes.    CTA BRAIN: Generalized ectasia along the [place], notably of the ICAs, MCAs, and vertebrobasilar system, may reflect uncontrolled hypertension or vasculopathy. No flow-limiting stenosis or occlusion.    CTA NECK: Patent cervical vasculature. No flow limiting stenosis or occlusion.    Retropharyngeal ICA course, left greater than right.    < end of copied text >      Consultant(s) Notes Reviewed:  Neurology, PMR    Care Discussed with Consultants/Other Providers: ABBEY Cheng re: trying to get MRI

## 2020-12-28 NOTE — CONSULT NOTE ADULT - SUBJECTIVE AND OBJECTIVE BOX
Patient is a 70y old  Female who presents with a chief complaint of Confusion x 6 days (27 Dec 2020 14:16)      HPI:  70F, self ambulating, history of HTN, colitis, L hand with index and middle finger fractures and pins placed, presented after shaking episode and change in mental status.   Patient states she did not recall the event.  She reports mild headache currently.  Ambulated with PT and OT.  Pt reports history of herniated disc, feels slightly weaker RLE due to pain.   Also reports R Lower back pain.  Also R foot pain, reports prior injury with healing fracture.       REVIEW OF SYSTEMS  Constitutional - No fever, No weight loss, No fatigue  HEENT - No eye pain, No visual disturbances, No difficulty hearing, No tinnitus, No vertigo, No neck pain  Respiratory - No cough, No wheezing, No shortness of breath  Cardiovascular - No chest pain, No palpitations  Gastrointestinal - No abdominal pain, No nausea, No vomiting, No diarrhea, No constipation  Genitourinary - No dysuria, No frequency, No hematuria, No incontinence  Neurological - + headaches, No memory loss, + mild loss of strength, No numbness, No tremors  Skin - No itching, No rashes, No lesions   Endocrine - No temperature intolerance  Musculoskeletal - + back pain  Psychiatric - No depression, No anxiety    PAST MEDICAL & SURGICAL HISTORY  H/O finger fracture  Hypertension  Colitis  S/P knee replacement  H/O myomectomy  H/O finger fracture    SOCIAL HISTORY  Smoking - Denied  EtOH - Denied   Drugs - Denied    FUNCTIONAL HISTORY  Lives in apartment in 5 stairs to enter, with railing  Independent, has cane from prior knee replacement but doesn't use it anymore    CURRENT FUNCTIONAL STATUS  Bed Mobility: Sit to Supine:     · Level of Nitro	independent    Bed Mobility: Supine to Sit:     · Level of Nitro	independent    Transfer: Sit to Stand:     · Level of Nitro	supervision    Transfer: Stand to Sit:     · Level of Nitro	supervision    Gait Skills:     · Level of Nitro	stand-by assist  · Gait Distance	200 feet      FAMILY HISTORY   FH: hypertension    No pertinent family history in first degree relatives        RECENT LABS/IMAGING  CBC Full  -  ( 28 Dec 2020 06:56 )  WBC Count : 5.48 K/uL  RBC Count : 3.78 M/uL  Hemoglobin : 10.7 g/dL  Hematocrit : 33.4 %  Platelet Count - Automated : 248 K/uL  Mean Cell Volume : 88.4 fL  Mean Cell Hemoglobin : 28.3 pg  Mean Cell Hemoglobin Concentration : 32.0 gm/dL  Auto Neutrophil # : x  Auto Lymphocyte # : x  Auto Monocyte # : x  Auto Eosinophil # : x  Auto Basophil # : x  Auto Neutrophil % : x  Auto Lymphocyte % : x  Auto Monocyte % : x  Auto Eosinophil % : x  Auto Basophil % : x        137  |  104  |  14  ----------------------------<  87  3.6   |  23  |  0.75    Ca    9.9      28 Dec 2020 06:56  Phos  3.6       Mg     2.1           Urinalysis Basic - ( 26 Dec 2020 16:12 )    Color: Colorless / Appearance: Clear / S.010 / pH: x  Gluc: x / Ketone: Negative  / Bili: Negative / Urobili: <2 mg/dL   Blood: x / Protein: Negative / Nitrite: Negative   Leuk Esterase: Negative / RBC: x / WBC x   Sq Epi: x / Non Sq Epi: x / Bacteria: x        VITALS  T(C): 37.1 (20 @ 06:29), Max: 37.3 (20 @ 20:35)  HR: 62 (20 @ 06:29) (62 - 84)  BP: 126/66 (20 @ 06:29) (120/80 - 148/96)  RR: 18 (20 @ 06:29) (15 - 18)  SpO2: 100% (20 @ 06:29) (99% - 100%)  Wt(kg): --    ALLERGIES  No Known Allergies      MEDICATIONS   aspirin enteric coated 81 milliGRAM(s) Oral daily  atorvastatin 80 milliGRAM(s) Oral at bedtime  celecoxib 200 milliGRAM(s) Oral daily  cholecalciferol 1000 Unit(s) Oral daily  enoxaparin Injectable 40 milliGRAM(s) SubCutaneous daily  gemfibrozil 600 milliGRAM(s) Oral two times a day  influenza  Vaccine (HIGH DOSE) 0.7 milliLiter(s) IntraMuscular once  mesalamine ER Capsule 1000 milliGRAM(s) Oral four times a day  mirabegron ER 25 milliGRAM(s) Oral daily  pantoprazole    Tablet 40 milliGRAM(s) Oral before breakfast      ----------------------------------------------------------------------------------------  PHYSICAL EXAM  Constitutional - NAD, Comfortable  HEENT - NCAT, EOMI   Chest - no respiratory distress  Cardiovascular - RRR, S1S2, No murmurs  Abdomen -  Soft, NTND  Extremities - No C/C/E, No calf tenderness   Neurologic Exam -                    Cognitive - Awake, Alert, AAO to self, place, date, year, situation     Communication - Fluent, No dysarthria     Cranial Nerves - CN 2-12 intact     Motor -                      LEFT    UE - ShAB 5/5, EF 5/5, EE 5/5, WE 5/5,  5/5                    RIGHT UE - ShAB 5/5, EF 5/5, EE 5/5, WE 5/5,  5/5                    LEFT    LE - HF 5/5, KE 5/5, DF 5/5, PF 5/5                    RIGHT LE - HF 4+/5, KE 5/5, DF 5/5, PF 5/5        Sensory - Intact to LT       Balance - WNL Static  Psychiatric - Mood stable, Affect WNL  ----------------------------------------------------------------------------------------  ASSESSMENT/PLAN  69 yo f p/w shaking episode, ams now resolved, also with mild back pain radiating down RLE. denies bowel or bladder incontinence.  ambulating independently.   workup in progress  x ray pending: R foot  Echo  pending  EEG pending  DVT PPX - lovenox  evaluated by PT and OT  SLP- on dysphagia diet  Rehab -when medically cleared,  recommend home with outpatient PT for balance, radicular back pain. If not improving after PT can follow up in PM&R office for further evaluation

## 2020-12-28 NOTE — SWALLOW BEDSIDE ASSESSMENT ADULT - ADDITIONAL RECOMMENDATIONS
1. monitor patient for signs of dysphagia/aspiration/penetration and reconsult SLP service as needed

## 2020-12-28 NOTE — SWALLOW BEDSIDE ASSESSMENT ADULT - SWALLOW EVAL: DIAGNOSIS
Patient presents with functional oropharyngeal swallow. Oral stage marked by adequate bolus acceptance and containment. Timely/efficient mastication and bolus preparation. Timely posterior bolus transport noted. Oral clearance is achieved post swallow. Pharyngeal stage marked by appearance of timely swallow initiation, present hyolaryngeal movement per palpation. NO evidence of aspiration/penetration throughout assessme

## 2020-12-29 ENCOUNTER — TRANSCRIPTION ENCOUNTER (OUTPATIENT)
Age: 70
End: 2020-12-29

## 2020-12-29 LAB
ALBUMIN SERPL ELPH-MCNC: 3.7 G/DL — SIGNIFICANT CHANGE UP (ref 3.3–5)
ALP SERPL-CCNC: 97 U/L — SIGNIFICANT CHANGE UP (ref 40–120)
ALT FLD-CCNC: 12 U/L — SIGNIFICANT CHANGE UP (ref 4–33)
ANION GAP SERPL CALC-SCNC: 10 MMOL/L — SIGNIFICANT CHANGE UP (ref 7–14)
AST SERPL-CCNC: 14 U/L — SIGNIFICANT CHANGE UP (ref 4–32)
BILIRUB SERPL-MCNC: 0.3 MG/DL — SIGNIFICANT CHANGE UP (ref 0.2–1.2)
BUN SERPL-MCNC: 13 MG/DL — SIGNIFICANT CHANGE UP (ref 7–23)
CALCIUM SERPL-MCNC: 9.7 MG/DL — SIGNIFICANT CHANGE UP (ref 8.4–10.5)
CHLORIDE SERPL-SCNC: 106 MMOL/L — SIGNIFICANT CHANGE UP (ref 98–107)
CO2 SERPL-SCNC: 24 MMOL/L — SIGNIFICANT CHANGE UP (ref 22–31)
CREAT SERPL-MCNC: 0.73 MG/DL — SIGNIFICANT CHANGE UP (ref 0.5–1.3)
GLUCOSE SERPL-MCNC: 80 MG/DL — SIGNIFICANT CHANGE UP (ref 70–99)
HCT VFR BLD CALC: 33.5 % — LOW (ref 34.5–45)
HGB BLD-MCNC: 10.5 G/DL — LOW (ref 11.5–15.5)
MAGNESIUM SERPL-MCNC: 2.1 MG/DL — SIGNIFICANT CHANGE UP (ref 1.6–2.6)
MCHC RBC-ENTMCNC: 28.3 PG — SIGNIFICANT CHANGE UP (ref 27–34)
MCHC RBC-ENTMCNC: 31.3 GM/DL — LOW (ref 32–36)
MCV RBC AUTO: 90.3 FL — SIGNIFICANT CHANGE UP (ref 80–100)
NRBC # BLD: 0 /100 WBCS — SIGNIFICANT CHANGE UP
NRBC # FLD: 0 K/UL — SIGNIFICANT CHANGE UP
PHOSPHATE SERPL-MCNC: 4 MG/DL — SIGNIFICANT CHANGE UP (ref 2.5–4.5)
PLATELET # BLD AUTO: 246 K/UL — SIGNIFICANT CHANGE UP (ref 150–400)
POTASSIUM SERPL-MCNC: 3.8 MMOL/L — SIGNIFICANT CHANGE UP (ref 3.5–5.3)
POTASSIUM SERPL-SCNC: 3.8 MMOL/L — SIGNIFICANT CHANGE UP (ref 3.5–5.3)
PROT SERPL-MCNC: 6.4 G/DL — SIGNIFICANT CHANGE UP (ref 6–8.3)
RBC # BLD: 3.71 M/UL — LOW (ref 3.8–5.2)
RBC # FLD: 15.3 % — HIGH (ref 10.3–14.5)
SODIUM SERPL-SCNC: 140 MMOL/L — SIGNIFICANT CHANGE UP (ref 135–145)
WBC # BLD: 5.37 K/UL — SIGNIFICANT CHANGE UP (ref 3.8–10.5)
WBC # FLD AUTO: 5.37 K/UL — SIGNIFICANT CHANGE UP (ref 3.8–10.5)

## 2020-12-29 PROCEDURE — 99232 SBSQ HOSP IP/OBS MODERATE 35: CPT

## 2020-12-29 RX ADMIN — MIRABEGRON 25 MILLIGRAM(S): 50 TABLET, EXTENDED RELEASE ORAL at 22:09

## 2020-12-29 RX ADMIN — Medication 600 MILLIGRAM(S): at 05:25

## 2020-12-29 RX ADMIN — ATORVASTATIN CALCIUM 80 MILLIGRAM(S): 80 TABLET, FILM COATED ORAL at 22:09

## 2020-12-29 RX ADMIN — Medication 600 MILLIGRAM(S): at 17:55

## 2020-12-29 RX ADMIN — Medication 1000 MILLIGRAM(S): at 13:02

## 2020-12-29 RX ADMIN — Medication 1000 MILLIGRAM(S): at 17:55

## 2020-12-29 RX ADMIN — Medication 81 MILLIGRAM(S): at 12:56

## 2020-12-29 RX ADMIN — Medication 1000 MILLIGRAM(S): at 05:25

## 2020-12-29 RX ADMIN — CELECOXIB 200 MILLIGRAM(S): 200 CAPSULE ORAL at 13:50

## 2020-12-29 RX ADMIN — ENOXAPARIN SODIUM 40 MILLIGRAM(S): 100 INJECTION SUBCUTANEOUS at 12:57

## 2020-12-29 RX ADMIN — PANTOPRAZOLE SODIUM 40 MILLIGRAM(S): 20 TABLET, DELAYED RELEASE ORAL at 05:25

## 2020-12-29 RX ADMIN — Medication 1000 MILLIGRAM(S): at 00:50

## 2020-12-29 RX ADMIN — CELECOXIB 200 MILLIGRAM(S): 200 CAPSULE ORAL at 12:56

## 2020-12-29 RX ADMIN — LIDOCAINE 1 APPLICATION(S): 4 CREAM TOPICAL at 22:09

## 2020-12-29 RX ADMIN — LIDOCAINE 1 APPLICATION(S): 4 CREAM TOPICAL at 13:02

## 2020-12-29 RX ADMIN — LIDOCAINE 1 APPLICATION(S): 4 CREAM TOPICAL at 05:25

## 2020-12-29 RX ADMIN — Medication 1000 UNIT(S): at 12:57

## 2020-12-29 NOTE — CHART NOTE - NSCHARTNOTEFT_GEN_A_CORE
- R foot XR demonstrating old, healed distal 5th metatarsal fracture   - no acute pod sx intervention  - recommend ACE wrap with surgical shoe to R foot with WBAT to heel   - to follow up w/ Dr. Joseph Waterhouse with in 7 days of discharge  - please юлия [phone] to schedule an appointment - R foot XR demonstrating old, healed distal 5th metatarsal fracture   - no acute pod sx intervention  - recommend ACE wrap with surgical shoe to R foot with WBAT to heel   - to follow up w/ Dr. Joseph Waterhouse with in 7 days of discharge  - please юлия [phone] to schedule an appointment

## 2020-12-29 NOTE — DISCHARGE NOTE PROVIDER - CARE PROVIDER_API CALL
Khadijah Avendano (MD)  PhysicalRehab Medicine  [address] [facility], [address]th [place] [address]  Phone: [phone]  Fax: [phone]  Follow Up Time:     Waterhouse, Joseph C (DPM)  Surgery  [address] [place] [address]  Phone: [phone]  Fax: [phone]  Follow Up Time:     MARIBEL YOUNG [place]  Internal Medicine  [address] [place] [address]  Phone: [phone]  Fax: [phone]  Follow Up Time:    Khadijah Avendano (MD)  PhysicalRehab Medicine  [address] [facility], [address]th [place] [address]  Phone: [phone]  Fax: [phone]  Follow Up Time:     Waterhouse, Joseph C (DPM)  Surgery  [address] [place] [address]  Phone: [phone]  Fax: [phone]  Follow Up Time:     MARIBEL YOUNG [place]  Internal Medicine  [address] [place] [address]  Phone: [phone]  Fax: [phone]  Follow Up Time:     Shay Jiménez  NEUROLOGY  [address] [place] [address]  Phone: [phone]  Fax: [phone]  Follow Up Time: 1 week

## 2020-12-29 NOTE — PROGRESS NOTE ADULT - SUBJECTIVE AND OBJECTIVE BOX
[facility] Medicine  Pager [phone]    Patient is a 70y old  Female who presents with a chief complaint of Confusion x 6 days (28 Dec 2020 12:59)      INTERVAL HPI/OVERNIGHT EVENTS:    MEDICATIONS  (STANDING):  aspirin enteric coated 81 milliGRAM(s) Oral daily  atorvastatin 80 milliGRAM(s) Oral at bedtime  celecoxib 200 milliGRAM(s) Oral daily  cholecalciferol 1000 Unit(s) Oral daily  enoxaparin Injectable 40 milliGRAM(s) SubCutaneous daily  gemfibrozil 600 milliGRAM(s) Oral two times a day  influenza  Vaccine (HIGH DOSE) 0.7 milliLiter(s) IntraMuscular once  lidocaine 2% Gel 1 Application(s) Topical three times a day  mesalamine ER Capsule 1000 milliGRAM(s) Oral four times a day  mirabegron ER 25 milliGRAM(s) Oral daily  pantoprazole    Tablet 40 milliGRAM(s) Oral before breakfast    MEDICATIONS  (PRN):      Allergies    No Known Allergies    Intolerances        REVIEW OF SYSTEMS:  Please see interval HPI:    Vital Signs Last 24 Hrs  T(C): 36.4 (29 Dec 2020 05:24), Max: 36.8 (28 Dec 2020 22:10)  T(F): 97.6 (29 Dec 2020 05:24), Max: 98.2 (28 Dec 2020 22:10)  HR: 64 (29 Dec 2020 05:24) (64 - 82)  BP: 111/66 (29 Dec 2020 05:24) (111/66 - 122/78)  BP(mean): --  RR: 17 (29 Dec 2020 05:24) (16 - 17)  SpO2: 100% (29 Dec 2020 05:24) (100% - 100%)  I&O's Detail        PHYSICAL EXAM:  GENERAL:   HEAD:    EYES:   ENMT:   NECK:   NERVOUS SYSTEM:    CHEST/LUNG:   HEART:   ABDOMEN:   EXTREMITIES:    LYMPH:   SKIN:     LABS:                        10.5   5.37  )-----------( 246      ( 29 Dec 2020 08:03 )             33.5     29 Dec 2020 08:03    140    |  106    |  13     ----------------------------<  80     3.8     |  24     |  0.73     Ca    9.7        29 Dec 2020 08:03  Phos  4.0       29 Dec 2020 08:03  Mg     2.1       29 Dec 2020 08:03    TPro  6.4    /  Alb  3.7    /  TBili  0.3    /  DBili  x      /  AST  14     /  ALT  12     /  AlkPhos  97     29 Dec 2020 08:03      CAPILLARY BLOOD GLUCOSE        BLOOD CULTURE  12-26 @ 16:01   <10,000 CFU/mL Normal Urogenital Jennifer  --  --    RADIOLOGY & ADDITIONAL TESTS:    Imaging Personally Reviewed:  [ ] YES     Consultant(s) Notes Reviewed:      Care Discussed with Consultants/Other Providers: [facility] Medicine  Pager [phone]    Patient is a 70y old  Female who presents with a chief complaint of Confusion x 6 days (28 Dec 2020 12:59)      INTERVAL HPI/OVERNIGHT EVENTS:  Currently feeling well, notes confusion improved, no other complaints at this time. Discussed neurology's differential, patient is amenable to workup as planned including MRI and EEG. Has some flowers at bedside, also balloon, enjoying them.     MEDICATIONS  (STANDING):  aspirin enteric coated 81 milliGRAM(s) Oral daily  atorvastatin 80 milliGRAM(s) Oral at bedtime  celecoxib 200 milliGRAM(s) Oral daily  cholecalciferol 1000 Unit(s) Oral daily  enoxaparin Injectable 40 milliGRAM(s) SubCutaneous daily  gemfibrozil 600 milliGRAM(s) Oral two times a day  influenza  Vaccine (HIGH DOSE) 0.7 milliLiter(s) IntraMuscular once  lidocaine 2% Gel 1 Application(s) Topical three times a day  mesalamine ER Capsule 1000 milliGRAM(s) Oral four times a day  mirabegron ER 25 milliGRAM(s) Oral daily  pantoprazole    Tablet 40 milliGRAM(s) Oral before breakfast    MEDICATIONS  (PRN):    Allergies  No Known Allergies    Intolerances    REVIEW OF SYSTEMS:  Please see interval HPI:    Vital Signs Last 24 Hrs  T(C): 36.4 (29 Dec 2020 05:24), Max: 36.8 (28 Dec 2020 22:10)  T(F): 97.6 (29 Dec 2020 05:24), Max: 98.2 (28 Dec 2020 22:10)  HR: 64 (29 Dec 2020 05:24) (64 - 82)  BP: 111/66 (29 Dec 2020 05:24) (111/66 - 122/78)  BP(mean): --  RR: 17 (29 Dec 2020 05:24) (16 - 17)  SpO2: 100% (29 Dec 2020 05:24) (100% - 100%)  I&O's Detail    PHYSICAL EXAM:  GENERAL: NAD, sitting in bed  HEAD:  NC/AT  EYES: EOMI, clear sclera/conjunctiva  ENMT: MMM  NECK: supple  NERVOUS SYSTEM: moving extremities, non-focal    CHEST/LUNG: CTAB, comfortable on RA, speaking in full sentences  HEART: S1S2 RRR  ABDOMEN: soft, non-tender  EXTREMITIES:  no c/c/e      LABS:                        10.5   5.37  )-----------( 246      ( 29 Dec 2020 08:03 )             33.5     29 Dec 2020 08:03    140    |  106    |  13     ----------------------------<  80     3.8     |  24     |  0.73     Ca    9.7        29 Dec 2020 08:03  Phos  4.0       29 Dec 2020 08:03  Mg     2.1       29 Dec 2020 08:03    TPro  6.4    /  Alb  3.7    /  TBili  0.3    /  DBili  x      /  AST  14     /  ALT  12     /  AlkPhos  97     29 Dec 2020 08:03      CAPILLARY BLOOD GLUCOSE      URINE CULTURE  12-26 @ 16:01   <10,000 CFU/mL Normal Urogenital Jennifer  --  --    RADIOLOGY & ADDITIONAL TESTS:    Imaging Personally Reviewed:  [ ] YES     Consultant(s) Notes Reviewed:  Podiatry    Care Discussed with Consultants/Other Providers: ABBEY Cheng re: neuro workup, podiatry input (ace wrap w/ surgical show, WBAT to heel)

## 2020-12-29 NOTE — DISCHARGE NOTE PROVIDER - NSDCFUADDINST_GEN_ALL_CORE_FT
Podiatry Discharge Instructions:  Follow up: Please follow up with Dr. Waterhouse within 1 week of discharge from the hospital, please call [phone] for appointment and discuss that you recently were seen in the hospital.  Wound Care:  Please apply ACE wrap to R foot  Weight bearing: Please weight bear as tolerated in a surgical shoe to R foot with weight distribution to heel

## 2020-12-29 NOTE — DISCHARGE NOTE PROVIDER - NSDCMRMEDTOKEN_GEN_ALL_CORE_FT
alendronate 70 mg oral tablet: 1 tab(s) orally once a week  Calcium 600+D oral tablet: 1 tab(s) orally once a day  gemfibrozil 600 mg oral tablet: 1 tab(s) orally 2 times a day  Lasix 20 mg oral tablet: 1 tab(s) orally once a day  meloxicam 15 mg oral tablet: 1 tab(s) orally once a day  mesalamine 1.2 g oral delayed release tablet: 2 tab(s) orally once a day  Myrbetriq 25 mg oral tablet, extended release: 1 tab(s) orally once a day  Norvasc 5 mg oral tablet: 1 tab(s) orally once a day  omeprazole 40 mg oral delayed release capsule: 1 cap(s) orally once a day  Vitamin D3 1000 intl units (25 mcg) oral tablet: 1 tab(s) orally once a day   alendronate 70 mg oral tablet: 1 tab(s) orally once a week  Calcium 600+D oral tablet: 1 tab(s) orally once a day  gemfibrozil 600 mg oral tablet: 1 tab(s) orally 2 times a day  Lasix 20 mg oral tablet: 1 tab(s) orally once a day  meloxicam 15 mg oral tablet: 1 tab(s) orally once a day  mesalamine 1.2 g oral delayed release tablet: 2 tab(s) orally once a day  Myrbetriq 25 mg oral tablet, extended release: 1 tab(s) orally once a day  Norvasc 5 mg oral tablet: 1 tab(s) orally once a day  omeprazole 40 mg oral delayed release capsule: 1 cap(s) orally once a day  Outaptient Physical Therapy:   Vitamin D3 1000 intl units (25 mcg) oral tablet: 1 tab(s) orally once a day   alendronate 70 mg oral tablet: 1 tab(s) orally once a week  Calcium 600+D oral tablet: 1 tab(s) orally once a day  gemfibrozil 600 mg oral tablet: 1 tab(s) orally 2 times a day  meloxicam 15 mg oral tablet: 1 tab(s) orally once a day  mesalamine 1.2 g oral delayed release tablet: 2 tab(s) orally once a day  Myrbetriq 25 mg oral tablet, extended release: 1 tab(s) orally once a day  Norvasc 5 mg oral tablet: 1 tab(s) orally once a day  omeprazole 40 mg oral delayed release capsule: 1 cap(s) orally once a day  Outaptient Physical Therapy:   Vitamin D3 1000 intl units (25 mcg) oral tablet: 1 tab(s) orally once a day

## 2020-12-29 NOTE — DISCHARGE NOTE PROVIDER - NSDCCPCAREPLAN_GEN_ALL_CORE_FT
PRINCIPAL DISCHARGE DIAGNOSIS  Diagnosis: Ataxia  Assessment and Plan of Treatment: You were noted to be ataxicm shaking, and confused by your [facility].  You were admitted to the hospital and evaluated by neurology .  Your work up for stroke and seizures were negative.    - Contineu with your medications as prescribed.   -You had a sonogram of your heart which was normal.    YOu were seen by the physiatrist who recommended outpt physical therapy for balance, radicular back pain   -If not improving after PT can follow up in PM&R office for further evaluation with Dr. Avendano. Please follow up with your pcp within 1 week of discharge.  Please call to make an appointment within 1 week of dsicharge.      SECONDARY DISCHARGE DIAGNOSES  Diagnosis: Foot fracture, right  Assessment and Plan of Treatment: - R foot Xray  demonstrating old, healed distal 5th metatarsal fracture   - no acute pod sx intervention  - recommend ACE wrap with surgical shoe to R foot with WBAT to heel   - to follow up with  Dr. Joseph Waterhouse with in 7 days of discharge  - please юлия [phone] to schedule an appointment within 1 week of discharge      Diagnosis: Colitis  Assessment and Plan of Treatment: Please follow up with your gastroenterologist within 1 week of dsicharge.  Continue mesalamine as prescribed.       Diagnosis: Essential hypertension  Assessment and Plan of Treatment: Continue blood pressure medication regimen as directed. Monitor for any visual changes, headaches or dizziness.  Monitor blood pressure regularly.  Follow up with your PCP for further management for high blood pressure.     PRINCIPAL DISCHARGE DIAGNOSIS  Diagnosis: Ataxia  Assessment and Plan of Treatment: You were noted to be ataxicm shaking, and confused by your [facility].  You were admitted to the hospital and evaluated by neurology .  Your work up for stroke and seizures were negative.    - Continue with your medications as prescribed.   -You had a sonogram of your heart which was normal.    You were seen by the physiatrist who recommended outpt physical therapy for balance, radicular back pain   -If not improving after PT can follow up in PM&R office for further evaluation with Dr. Avendano. Please follow up with your pcp within 1 week of discharge.  Please call to make an appointment within 1 week of dsicharge.   You may also follow up outpatient [facility] Neurologist Dr Jiménez in 1 week of discharge      SECONDARY DISCHARGE DIAGNOSES  Diagnosis: Foot fracture, right  Assessment and Plan of Treatment: - R foot Xray  demonstrating old, healed distal 5th metatarsal fracture   - no acute pod sx intervention  - recommend ACE wrap with surgical shoe to R foot with WBAT to heel   - to follow up with  Dr. Joseph Waterhouse with in 7 days of discharge  - please юлия [phone] to schedule an appointment within 1 week of discharge      Diagnosis: Colitis  Assessment and Plan of Treatment: Please follow up with your gastroenterologist within 1 week of dsicharge.  Continue mesalamine as prescribed.       Diagnosis: Essential hypertension  Assessment and Plan of Treatment: Continue blood pressure medication regimen as directed. Monitor for any visual changes, headaches or dizziness.  Monitor blood pressure regularly.  Follow up with your PCP for further management for high blood pressure.     PRINCIPAL DISCHARGE DIAGNOSIS  Diagnosis: Ataxia  Assessment and Plan of Treatment: You were noted to be ataxicm shaking, and confused by your [facility].  You were admitted to the hospital and evaluated by neurology .  Your work up for stroke and seizures were negative.    - Continue with your medications as prescribed.   -You had a sonogram of your heart which was normal.    You were seen by the physiatrist who recommended outpt physical therapy for balance, radicular back pain   -If not improving after PT can follow up in PM&R office for further evaluation with Dr. Avendano. Please follow up with your pcp within 1 week of discharge.  Please call to make an appointment within 1 week of dsicharge.   You may also follow up outpatient [facility] Neurologist Dr Jiménez in 1 week of discharge      SECONDARY DISCHARGE DIAGNOSES  Diagnosis: Foot fracture, right  Assessment and Plan of Treatment: - R foot Xray  demonstrating old, healed distal 5th metatarsal fracture   - no acute pod sx intervention  - recommend ACE wrap with surgical shoe to R foot with WBAT to heel   - to follow up with  Dr. Joseph Waterhouse with in 7 days of discharge  - please юлия [phone] to schedule an appointment within 1 week of discharge      Diagnosis: Colitis  Assessment and Plan of Treatment: Please follow up with your gastroenterologist within 1 week of dsicharge.  Continue mesalamine as prescribed.       Diagnosis: Essential hypertension  Assessment and Plan of Treatment: Continue blood pressure Amlodipine(Norvasc),medication regimen as directed. Do not take the lasix (water pill) until you follow up with Dr Morel. Monitor for any visual changes, headaches or dizziness.  Monitor blood pressure regularly.  Follow up with your PCP for further management for high blood pressure.     PRINCIPAL DISCHARGE DIAGNOSIS  Diagnosis: Ataxia  Assessment and Plan of Treatment: You were noted to be ataxic, shaking, and confused by your   You were admitted to the hospital and evaluated by neurology .  Your work up for stroke and seizures were negative.    - Continue with your medications as prescribed.   -You had a sonogram of your heart which was normal.    You were seen by the physiatrist who recommended outpt physical therapy for balance, radicular back pain   -If not improving after PT can follow up in PM&R office for further evaluation with Dr. Avendano. Please follow up with your pcp within 1 week of discharge.  Please call to make an appointment within 1 week of dsicharge.   You may also follow up outpatient with the Neurologist Dr Jiménez in 1 week of discharge for further evaluation/monitoring      SECONDARY DISCHARGE DIAGNOSES  Diagnosis: Foot fracture, right  Assessment and Plan of Treatment: - R foot Xray  demonstrating old, healed distal 5th metatarsal fracture   - no acute pod sx intervention  - recommend ACE wrap with surgical shoe to R foot with WBAT to heel   - to follow up with  Dr. Joseph Waterhouse with in 7 days of discharge  - please юлия [phone] to schedule an appointment within 1 week of discharge      Diagnosis: Colitis  Assessment and Plan of Treatment: Please follow up with your gastroenterologist within 1 week of dsicharge.  Continue mesalamine as prescribed.       Diagnosis: Essential hypertension  Assessment and Plan of Treatment: Continue blood pressure Amlodipine(Norvasc),medication regimen as directed. Do not take the lasix (water pill) until you follow up with Dr Morel. Monitor for any visual changes, headaches or dizziness.  Monitor blood pressure regularly.  Follow up with your PCP for further management for high blood pressure.

## 2020-12-29 NOTE — DISCHARGE NOTE PROVIDER - HOSPITAL COURSE
71yo L-handed woman with PMH of HTN and colitis presented to the ED after an episode of generalized shaking with persistent confusion for the past 6 days admitted for forgetfulness, generalized shaking and poor coordination possibly due R cerebellar dysfunction from ischemic CVA of unknown etiology vs. seizure activity vs. NPH in the setting of urinary frequency.      Ataxia, shaking, confusion  -Neuro following   - differential includes possible R cerebellar infarct vs seizure activity/post ictal state  - neuro input appreciated  - CT head with chronic ischemic changes, ectasias possibly 2/2 hypertension  - pending 24hr EEG to investigate possibility of seizure (monitor off AEDs at this time as per neuro)  -MRI to rule out CVA:No acute infarct. Age-appropriate involutional changes and mild microvascular ischemic change.  - c/w asa/statin, stroke risk factor modifications  - echo w/ bubble study showing normal LV function, no e/o PFO  - monitoring on telemetry  Normal mitral valve. Minimal mitral regurgitation.  2. Normal left ventricular internal dimensions and wall  thicknesses.  3. Endocardium not well visualized; unable to evaluate left  ventricular systolic function.  4. Unable to accurately evaluate right ventricular size or  systolic function.  Consider limited repeat imaging with the intravenous  administration of ultrasound enhancing agent for improved  evaluation of LV systolic function, if clinically  indicated.-EEG-------------------------  -start ASA 81 daily  -Start atorva 80 mg daily, titrate to LDL <70  - DVT ppx  - UA: neg   -Lipid panel: Cholesterol 226, Triglycerides 50, HDL 76,   -HbA1C: 5.2   PT/OT: no needs: PM and R: outpt PT for balance, radicular back pain   dysphagia screen: Regular with thin liquids   -Fall precautions  -If not improving after PT can follow up in PM&R office for further evaluation: OT: no needs    Essential HTN  - s/p period of permissive HTN, can likely resume home amlodipine as needed, though current BP in acceptable range    Colitis  - stable on mesalamine    Foot fracture  - R foot XR demonstrating old, healed distal 5th metatarsal fracture   - no acute pod sx intervention  - recommend ACE wrap with surgical shoe to R foot with WBAT to heel   - to follow up w/ Dr. Joseph Waterhouse with in 7 days of discharge  - please юлия [phone] to schedule an appointment.    DVT ppx  -lovenox        On_________, discussed with __________, patient is medically cleared and optimized for discharge today. All medications were reviewed with attending, and sent to mutually agreed upon pharmacy. 69 yo F w/ PMHx of HTN, and colitis (on mesalamine) presenting w/ confusion, generalized shaking and poor coordination, currently undergoing further neurological workup    # Ataxia, shaking, confusion  - differential includes possible R cerebellar infarct vs seizure activity/post ictal state  - neuro input appreciated  - CT head with chronic ischemic changes, ectasias possibly 2/2 hypertension  - monitoring off AEDs at this time as per neuro, spot EEG obtained without evidence of epileptiform activity  - MRI negative for acute infarct  - c/w asa/statin, stroke risk factor modifications  - echo w/ bubble study showing normal LV function, no e/o PFO  - s/p monitoring on telemetry  - patient reports she has returned to baseline  -EEG - No siezures observed .Normal EEG in the awake, drowsy and asleep states.  -Neuro consulted appreciate recs  - will f/u further neuro recs, anticipate can d/c home tomorrow  -no neurological contraindication for discharge, patient to follow up outpatient with Neurologist Dr Jiménez     # Essential HTN  - s/p period of permissive Continue blood pressure medication regimen as directed. Monitor for any visual changes, headaches or dizziness.  Monitor blood pressure regularly.  Follow up with your PCP for further management for high blood pressure.  - can likely resume home amlodipine as needed, though current BP in acceptable range (SBP 120s)    # Colitis  - stable on mesalamine    # Foot fracture  - does not appear to be impeding ambulation, denies pain at this time  - appreciate podiatry input, ace wrap w/ surgical shoe, WBAT to heel,  - recommend outpatient podiatry followup w/ Dr. Joseph Waterhouse ([phone])    DVT ppx; lovenox- not needed on discharge    Dispo: Patient seen and evaluated. Afebrile, VSS. Tolerating diet. Voiding and having regular bowel function without diffiuclty. Evaluated by PT/OT no skilled PT needs, PMR eval appreciated, may benefit from outpatient PT for balance, radicular back pain. Reviewed discharge medications with patient and attending. Patient case discussed and reviewed with Attending Physician Dr Fournier who agrees the patient is medically stable and optimized for discharge. All medications were reviewed and reconciled. Patient understands and agrees with plan of care.   71 yo F w/ PMHx of HTN, and colitis (on mesalamine) presenting w/ confusion, generalized shaking and poor coordination, currently undergoing further neurological workup    # Ataxia, shaking, confusion  - differential includes possible R cerebellar infarct vs seizure activity/post ictal state  - neuro input appreciated  - CT head with chronic ischemic changes, ectasias possibly 2/2 hypertension  - monitoring off AEDs at this time as per neuro, spot EEG obtained without evidence of epileptiform activity  - MRI negative for acute infarct  - c/w asa/statin, stroke risk factor modifications  - echo w/ bubble study showing normal LV function, no e/o PFO  - s/p monitoring on telemetry  - patient reports she has returned to baseline  -EEG - No siezures observed .Normal EEG in the awake, drowsy and asleep states.  -Neuro consulted appreciate recs  - will f/u further neuro recs, anticipate can d/c home tomorrow  -no neurological contraindication for discharge, patient to follow up outpatient with Neurologist Dr Jiménez     # Essential HTN  - s/p period of permissive Continue blood pressure medication regimen as directed. Monitor for any visual changes, headaches or dizziness.  Monitor blood pressure regularly.  Follow up with your PCP for further management for high blood pressure.  - can likely resume home amlodipine as needed, though current BP in acceptable range (SBP 120s)    # Colitis  - stable on mesalamine    # Foot fracture  - does not appear to be impeding ambulation, denies pain at this time  - appreciate podiatry input, ace wrap w/ surgical shoe, WBAT to heel,  - recommend outpatient podiatry followup w/ Dr. Joseph Waterhouse ([phone])    DVT ppx; lovenox- not needed on discharge    Dispo: Patient seen and evaluated. Afebrile, VSS. Tolerating diet. Voiding and having regular bowel function without diffiuclty. Evaluated by PT/OT no skilled PT needs, PMR eval appreciated, may benefit from outpatient PT for balance, radicular back pain. Reviewed discharge medications with patient and attending. Patient case discussed and reviewed with Attending Physician Dr Fournier who agrees the patient is medically stable and optimized for discharge. 12/31/20 with follow up with [facility] Physician Dr Patterson. All medications were reviewed and reconciled. Patient understands and agrees with plan of care.   71 yo F w/ PMHx of HTN, and colitis (on mesalamine) presenting w/ confusion, generalized shaking and poor coordination, currently undergoing further neurological workup.    # Ataxia, shaking, confusion  - differential includes possible R cerebellar infarct vs seizure activity/post ictal state  - neuro input appreciated  - CT head with chronic ischemic changes, ectasias possibly 2/2 hypertension  - monitoring off AEDs at this time as per neuro, spot EEG obtained without evidence of epileptiform activity  - MRI negative for acute infarct  - echo w/ bubble study showing normal LV function, no e/o PFO  - s/p monitoring on telemetry  - patient reports she has returned to baseline  - no neurological contraindication for discharge, patient to follow up outpatient with Neurologist Dr Jiménez     # Essential HTN  - s/p period of permissive Continue blood pressure medication regimen as directed. Monitor for any visual changes, headaches or dizziness.  Monitor blood pressure regularly.  Follow up with your PCP for further management for high blood pressure.  - can likely resume home amlodipine as needed, though current BP in acceptable range (SBP 120s)    # Colitis  - stable on mesalamine    # Foot fracture  - does not appear to be impeding ambulation, denies pain at this time  - patient was evaluated by podiatry, recommended ace wrap w/ surgical shoe, WBAT to heel, and outpatient podiatry followup w/ Dr. Joseph Waterhouse ([phone])    DVT ppx; lovenox- not needed on discharge    Dispo: Patient seen and evaluated. Afebrile, VSS. Tolerating diet. Voiding and having regular bowel function without diffiuclty. Evaluated by PT/OT no skilled PT needs, PMR eval appreciated, may benefit from outpatient PT for balance, radicular back pain. Reviewed discharge medications with patient and attending. Patient case discussed and reviewed with Attending Physician Dr Fournier who agrees the patient is medically stable and optimized for discharge. 12/31/20 with follow up with [facility] Care Physician Dr Patterson. All medications were reviewed and reconciled. Patient understands and agrees with plan of care.

## 2020-12-29 NOTE — DISCHARGE NOTE PROVIDER - CARE PROVIDERS DIRECT ADDRESSES
,[email or web address],DirectAddress_Unknown,DirectAddress_Unknown [email or web address],DirectAddress_Unknown,DirectAddress_Unknown,DirectAddress_Unknown

## 2020-12-29 NOTE — DISCHARGE NOTE PROVIDER - PROVIDER TOKENS
PROVIDER:[TOKEN:[[id number]]],PROVIDER:[TOKEN:[[id number]]],PROVIDER:[TOKEN:[[id number]]] PROVIDER:[TOKEN:[[id number]]],PROVIDER:[TOKEN:[[id number]]],PROVIDER:[TOKEN:[[id number]]],PROVIDER:[TOKEN:[[id number]],FOLLOWUP:[1 week]]

## 2020-12-30 PROCEDURE — 99233 SBSQ HOSP IP/OBS HIGH 50: CPT

## 2020-12-30 PROCEDURE — 95816 EEG AWAKE AND DROWSY: CPT | Mod: 26

## 2020-12-30 PROCEDURE — 70551 MRI BRAIN STEM W/O DYE: CPT | Mod: 26

## 2020-12-30 RX ORDER — POLYETHYLENE GLYCOL 3350 17 G/17G
17 POWDER, FOR SOLUTION ORAL DAILY
Refills: 0 | Status: DISCONTINUED | OUTPATIENT
Start: 2020-12-30 | End: 2020-12-31

## 2020-12-30 RX ADMIN — ENOXAPARIN SODIUM 40 MILLIGRAM(S): 100 INJECTION SUBCUTANEOUS at 12:07

## 2020-12-30 RX ADMIN — Medication 600 MILLIGRAM(S): at 17:04

## 2020-12-30 RX ADMIN — Medication 1000 UNIT(S): at 12:07

## 2020-12-30 RX ADMIN — CELECOXIB 200 MILLIGRAM(S): 200 CAPSULE ORAL at 12:07

## 2020-12-30 RX ADMIN — Medication 1000 MILLIGRAM(S): at 05:58

## 2020-12-30 RX ADMIN — Medication 1000 MILLIGRAM(S): at 12:06

## 2020-12-30 RX ADMIN — MIRABEGRON 25 MILLIGRAM(S): 50 TABLET, EXTENDED RELEASE ORAL at 12:07

## 2020-12-30 RX ADMIN — LIDOCAINE 1 APPLICATION(S): 4 CREAM TOPICAL at 12:07

## 2020-12-30 RX ADMIN — PANTOPRAZOLE SODIUM 40 MILLIGRAM(S): 20 TABLET, DELAYED RELEASE ORAL at 05:58

## 2020-12-30 RX ADMIN — Medication 600 MILLIGRAM(S): at 05:58

## 2020-12-30 RX ADMIN — ATORVASTATIN CALCIUM 80 MILLIGRAM(S): 80 TABLET, FILM COATED ORAL at 21:46

## 2020-12-30 RX ADMIN — LIDOCAINE 1 APPLICATION(S): 4 CREAM TOPICAL at 05:59

## 2020-12-30 RX ADMIN — LIDOCAINE 1 APPLICATION(S): 4 CREAM TOPICAL at 21:47

## 2020-12-30 RX ADMIN — Medication 1000 MILLIGRAM(S): at 17:04

## 2020-12-30 RX ADMIN — Medication 81 MILLIGRAM(S): at 12:07

## 2020-12-30 RX ADMIN — Medication 1000 MILLIGRAM(S): at 00:24

## 2020-12-30 NOTE — CHART NOTE - NSCHARTNOTEFT_GEN_A_CORE
eeg exxpedited d/w Dr. Reyes no need for 24 eeg ok to order awake and asleep eeg. D.w Dr. Fournier in agreement with plan

## 2020-12-30 NOTE — PROGRESS NOTE ADULT - SUBJECTIVE AND OBJECTIVE BOX
[facility] Medicine  Pager [phone]    Patient is a 70y old  Female who presents with a chief complaint of Confusion x 6 days (29 Dec 2020 12:04)      INTERVAL HPI/OVERNIGHT EVENTS:    MEDICATIONS  (STANDING):  aspirin enteric coated 81 milliGRAM(s) Oral daily  atorvastatin 80 milliGRAM(s) Oral at bedtime  celecoxib 200 milliGRAM(s) Oral daily  cholecalciferol 1000 Unit(s) Oral daily  enoxaparin Injectable 40 milliGRAM(s) SubCutaneous daily  gemfibrozil 600 milliGRAM(s) Oral two times a day  influenza  Vaccine (HIGH DOSE) 0.7 milliLiter(s) IntraMuscular once  lidocaine 2% Gel 1 Application(s) Topical three times a day  mesalamine ER Capsule 1000 milliGRAM(s) Oral four times a day  mirabegron ER 25 milliGRAM(s) Oral daily  pantoprazole    Tablet 40 milliGRAM(s) Oral before breakfast    MEDICATIONS  (PRN):      Allergies    No Known Allergies    Intolerances        REVIEW OF SYSTEMS:  Please see interval HPI:    Vital Signs Last 24 Hrs  T(C): 36.4 (30 Dec 2020 05:57), Max: 36.8 (29 Dec 2020 12:01)  T(F): 97.6 (30 Dec 2020 05:57), Max: 98.2 (29 Dec 2020 12:01)  HR: 66 (30 Dec 2020 05:57) (66 - 82)  BP: 123/77 (30 Dec 2020 05:57) (110/69 - 127/86)  BP(mean): --  RR: 17 (30 Dec 2020 05:57) (17 - 18)  SpO2: 100% (30 Dec 2020 05:57) (100% - 100%)  I&O's Detail        PHYSICAL EXAM:  GENERAL:   HEAD:    EYES:   ENMT:   NECK:   NERVOUS SYSTEM:    CHEST/LUNG:   HEART:   ABDOMEN:   EXTREMITIES:    LYMPH:   SKIN:     LABS:      Ca    9.7        29 Dec 2020 08:03        CAPILLARY BLOOD GLUCOSE        BLOOD CULTURE  12-26 @ 16:01   <10,000 CFU/mL Normal Urogenital Jennifer  --  --    RADIOLOGY & ADDITIONAL TESTS:    Imaging Personally Reviewed:  [ ] YES     Consultant(s) Notes Reviewed:      Care Discussed with Consultants/Other Providers: [facility] Medicine  Pager [phone]    Patient is a 70y old  Female who presents with a chief complaint of Confusion x 6 days (29 Dec 2020 12:04)      INTERVAL HPI/OVERNIGHT EVENTS:  Patient in good spirits,  had sent her some treats (special [place] peanut drink, best enjoyed on ice) in addition to other things (balloon, flowers etc). Feels back to her baseline, happy to hear MRI negative for acute intracranial pathology. Is amenable to getting EEG done (as recommended by neurology). Hopeful to be able to go home afterwards (hopefully tomorrow). R foot doing much better s/p Ace Wrap and surgical shoe.     MEDICATIONS  (STANDING):  aspirin enteric coated 81 milliGRAM(s) Oral daily  atorvastatin 80 milliGRAM(s) Oral at bedtime  celecoxib 200 milliGRAM(s) Oral daily  cholecalciferol 1000 Unit(s) Oral daily  enoxaparin Injectable 40 milliGRAM(s) SubCutaneous daily  gemfibrozil 600 milliGRAM(s) Oral two times a day  influenza  Vaccine (HIGH DOSE) 0.7 milliLiter(s) IntraMuscular once  lidocaine 2% Gel 1 Application(s) Topical three times a day  mesalamine ER Capsule 1000 milliGRAM(s) Oral four times a day  mirabegron ER 25 milliGRAM(s) Oral daily  pantoprazole    Tablet 40 milliGRAM(s) Oral before breakfast    MEDICATIONS  (PRN):    Allergies  No Known Allergies    Intolerances    REVIEW OF SYSTEMS:  Please see interval HPI:    Vital Signs Last 24 Hrs  T(C): 36.4 (30 Dec 2020 05:57), Max: 36.8 (29 Dec 2020 12:01)  T(F): 97.6 (30 Dec 2020 05:57), Max: 98.2 (29 Dec 2020 12:01)  HR: 66 (30 Dec 2020 05:57) (66 - 82)  BP: 123/77 (30 Dec 2020 05:57) (110/69 - 127/86)  BP(mean): --  RR: 17 (30 Dec 2020 05:57) (17 - 18)  SpO2: 100% (30 Dec 2020 05:57) (100% - 100%)  I&O's Detail    PHYSICAL EXAM:  GENERAL: NAD, sitting in bed  HEAD:  NC/AT  EYES: EOMI, clear sclera/conjunctiva  ENMT: MMM  NECK: supple  NERVOUS SYSTEM: moving all extremities, non-focal    CHEST/LUNG: Comfortable on RA, CTAB, no respiratory distress  HEART: S1S2 RRR  ABDOMEN: soft, non-tender  EXTREMITIES:  no c/c/e      LABS:                        10.5   5.37  )-----------( 246      ( 29 Dec 2020 08:03 )             33.5     12-29    140  |  106  |  13  ----------------------------<  80  3.8   |  24  |  0.73    Ca    9.7      29 Dec 2020 08:03  Phos  4.0     12-29  Mg     2.1     12-29    TPro  6.4  /  Alb  3.7  /  TBili  0.3  /  DBili  x   /  AST  14  /  ALT  12  /  AlkPhos  97  12-29      CAPILLARY BLOOD GLUCOSE    URINE CULTURE  12-26 @ 16:01   <10,000 CFU/mL Normal Urogenital Jennifer  --  --    RADIOLOGY & ADDITIONAL TESTS:    Imaging Personally Reviewed:  [ x] YES   < from: MR Head No Cont (12.30.20 @ 00:28) >  FINDINGS:No acute hemorrhage or infarct is identified. No hydrocephalus, midline shift or extra-axial collections are present. Age-appropriate involutional changes and mild microvascular ischemic changes are present.    Major flow voids at the skull base are within normal limits.    The orbits are not remarkable in appearance.    The visualized paranasal sinuses and tympanomastoid cavities are free of acute disease.    IMPRESSION:    No acute infarct. Age-appropriate involutional changes and mild microvascular ischemic change.    < end of copied text >    `EEG SUMMARY/CLASSIFICATION    Normal EEG in the awake, drowsy and asleep states.  _____________________________________________________________  EEG IMPRESSION/CLINICAL CORRELATE    Normal EEG study.  No epileptiform pattern or seizure seen.      Consultant(s) Notes Reviewed:      Care Discussed with Consultants/Other Providers: ABBEY Cheng re: neuro f/u, per neuro OK to do spot EEG instead of 24hr vEEG

## 2020-12-30 NOTE — EEG REPORT - NS EEG TEXT BOX
[facility]   COMPREHENSIVE EPILEPSY CENTER   REPORT OF ROUTINE VIDEO EEG     [facility]: [address] , [address], [place] [address], Ph#: [phone]  [facility]: [address], [place] [address], Ph#: [phone]      Patient Name: CHRISSY MEJIAS  Age and : 70y (48)  MRN #: [id number]  Location: [place] [address] A  Referring Physician: Tashi Fournier    Study Date: 20    _____________________________________________________________  TECHNICAL INFORMATION    Placement and Labeling of Electrodes:  The EEG was performed utilizing 20 channels referential EEG connections (coronal over temporal over parasagittal montage) using all standard 10-20 electrode placements with EKG.  Recording was at a sampling rate of 256 samples per second per channel.  Time synchronized digital video recording was done simultaneously with EEG recording.  A low light infrared camera was used for low light recording.  Robert and seizure detection algorithms were utilized.    _____________________________________________________________  HISTORY    Patient is a 70y old  Female who presents with a chief complaint of Confusion x 6 days (30 Dec 2020 10:35)      PERTINENT MEDICATION:  MEDICATIONS  (STANDING):  aspirin enteric coated 81 milliGRAM(s) Oral daily  atorvastatin 80 milliGRAM(s) Oral at bedtime  celecoxib 200 milliGRAM(s) Oral daily  cholecalciferol 1000 Unit(s) Oral daily  enoxaparin Injectable 40 milliGRAM(s) SubCutaneous daily  gemfibrozil 600 milliGRAM(s) Oral two times a day  influenza  Vaccine (HIGH DOSE) 0.7 milliLiter(s) IntraMuscular once  lidocaine 2% Gel 1 Application(s) Topical three times a day  mesalamine ER Capsule 1000 milliGRAM(s) Oral four times a day  mirabegron ER 25 milliGRAM(s) Oral daily  pantoprazole    Tablet 40 milliGRAM(s) Oral before breakfast  polyethylene glycol 3350 17 Gram(s) Oral daily    _____________________________________________________________  STUDY INTERPRETATION    Findings: The background was continuous, spontaneously variable and reactive. During wakefulness, the posterior dominant rhythm consisted of symmetric, well-modulated 10 Hz activity, with amplitude to 30 uV, that attenuated to eye opening.  Low amplitude frontal beta was noted in wakefulness.    Background Slowing:  No generalized background slowing was present.    Focal Slowing:   None were present.      Sleep Background:  Drowsiness was characterized by fragmentation, attenuation, and slowing of the background activity.    Sleep was characterized by the presence of vertex waves, symmetric sleep spindles and K-complexes.    Other Non-Epileptiform Findings:  None were present.      Interictal Epileptiform Activity:   None were present.    Events:  Clinical events: None recorded.  Seizures: None recorded.    Activation Procedures:   Hyperventilation was performed and did not elicit any abnormality.  Photic stimulation was performed and did not elicit any abnormality.     Artifacts:  Intermittent myogenic and movement artifacts were noted.    ECG:  The heart rate on single channel ECG was predominantly between 70-80 BPM.    _____________________________________________________________  EEG SUMMARY/CLASSIFICATION    Normal EEG in the awake, drowsy and asleep states.  _____________________________________________________________  EEG IMPRESSION/CLINICAL CORRELATE    Normal EEG study.  No epileptiform pattern or seizure seen.    _____________________________________________________________        Prelim read     Nic Harris MD  Epilepsy Fellow, [facility] Epilepsy [place]      Epilepsy Reading Room Ph# [phone]  Epilepsy Answering Service after 5 pm and before 8:30 am: Ph# [phone] [facility]   COMPREHENSIVE EPILEPSY CENTER   REPORT OF ROUTINE VIDEO EEG     [facility]: [address] , [address], [place] [address], Ph#: [phone]  [facility]: [address], [place] [address], Ph#: [phone]      Patient Name: CHRISSY MEJIAS  Age and : 70y (48)  MRN #: [id number]  Location: [place] [address] A  Referring Physician: Tashi Fournier    Study Date: 20    _____________________________________________________________  TECHNICAL INFORMATION    Placement and Labeling of Electrodes:  The EEG was performed utilizing 20 channels referential EEG connections (coronal over temporal over parasagittal montage) using all standard 10-20 electrode placements with EKG.  Recording was at a sampling rate of 256 samples per second per channel.  Time synchronized digital video recording was done simultaneously with EEG recording.  A low light infrared camera was used for low light recording.  Robert and seizure detection algorithms were utilized.    _____________________________________________________________  HISTORY    Patient is a 70y old  Female who presents with a chief complaint of Confusion x 6 days (30 Dec 2020 10:35)      PERTINENT MEDICATION:  MEDICATIONS  (STANDING):  aspirin enteric coated 81 milliGRAM(s) Oral daily  atorvastatin 80 milliGRAM(s) Oral at bedtime  celecoxib 200 milliGRAM(s) Oral daily  cholecalciferol 1000 Unit(s) Oral daily  enoxaparin Injectable 40 milliGRAM(s) SubCutaneous daily  gemfibrozil 600 milliGRAM(s) Oral two times a day  influenza  Vaccine (HIGH DOSE) 0.7 milliLiter(s) IntraMuscular once  lidocaine 2% Gel 1 Application(s) Topical three times a day  mesalamine ER Capsule 1000 milliGRAM(s) Oral four times a day  mirabegron ER 25 milliGRAM(s) Oral daily  pantoprazole    Tablet 40 milliGRAM(s) Oral before breakfast  polyethylene glycol 3350 17 Gram(s) Oral daily    _____________________________________________________________  STUDY INTERPRETATION    Findings: The background was continuous, spontaneously variable and reactive. During wakefulness, the posterior dominant rhythm consisted of symmetric, well-modulated 10 Hz activity, with amplitude to 30 uV, that attenuated to eye opening.  Low amplitude frontal beta was noted in wakefulness.    Background Slowing:  No generalized background slowing was present.    Focal Slowing:   None were present.      Sleep Background:  Drowsiness was characterized by fragmentation, attenuation, and slowing of the background activity.    Sleep was characterized by the presence of vertex waves, symmetric sleep spindles and K-complexes.    Other Non-Epileptiform Findings:  None were present.      Interictal Epileptiform Activity:   None were present.    Events:  Clinical events: None recorded.  Seizures: None recorded.    Activation Procedures:   Hyperventilation was performed and did not elicit any abnormality.  Photic stimulation was performed and did not elicit any abnormality.     Artifacts:  Intermittent myogenic and movement artifacts were noted.    ECG:  The heart rate on single channel ECG was predominantly between 70-80 BPM.    _____________________________________________________________  EEG SUMMARY/CLASSIFICATION    Normal EEG in the awake, drowsy and asleep states.  _____________________________________________________________  EEG IMPRESSION/CLINICAL CORRELATE    Normal EEG study.  No epileptiform pattern or seizure seen.        Nic Harris MD  Epilepsy Fellow, [facility]      Epilepsy Reading Room Ph# [phone]  Epilepsy Answering Service after 5 pm and before 8:30 am: # [phone]

## 2020-12-31 ENCOUNTER — TRANSCRIPTION ENCOUNTER (OUTPATIENT)
Age: 70
End: 2020-12-31

## 2020-12-31 VITALS
DIASTOLIC BLOOD PRESSURE: 78 MMHG | RESPIRATION RATE: 18 BRPM | SYSTOLIC BLOOD PRESSURE: 127 MMHG | HEART RATE: 78 BPM | TEMPERATURE: 98 F | OXYGEN SATURATION: 98 %

## 2020-12-31 LAB
ALBUMIN SERPL ELPH-MCNC: 4.3 G/DL — SIGNIFICANT CHANGE UP (ref 3.3–5)
ALP SERPL-CCNC: 129 U/L — HIGH (ref 40–120)
ALT FLD-CCNC: 38 U/L — HIGH (ref 4–33)
ANION GAP SERPL CALC-SCNC: 11 MMOL/L — SIGNIFICANT CHANGE UP (ref 7–14)
AST SERPL-CCNC: 48 U/L — HIGH (ref 4–32)
BILIRUB SERPL-MCNC: 0.2 MG/DL — SIGNIFICANT CHANGE UP (ref 0.2–1.2)
BUN SERPL-MCNC: 17 MG/DL — SIGNIFICANT CHANGE UP (ref 7–23)
CALCIUM SERPL-MCNC: 10.7 MG/DL — HIGH (ref 8.4–10.5)
CHLORIDE SERPL-SCNC: 102 MMOL/L — SIGNIFICANT CHANGE UP (ref 98–107)
CO2 SERPL-SCNC: 24 MMOL/L — SIGNIFICANT CHANGE UP (ref 22–31)
CREAT SERPL-MCNC: 0.81 MG/DL — SIGNIFICANT CHANGE UP (ref 0.5–1.3)
GLUCOSE SERPL-MCNC: 83 MG/DL — SIGNIFICANT CHANGE UP (ref 70–99)
HCT VFR BLD CALC: 37.6 % — SIGNIFICANT CHANGE UP (ref 34.5–45)
HGB BLD-MCNC: 11.8 G/DL — SIGNIFICANT CHANGE UP (ref 11.5–15.5)
MAGNESIUM SERPL-MCNC: 2.2 MG/DL — SIGNIFICANT CHANGE UP (ref 1.6–2.6)
MCHC RBC-ENTMCNC: 28.2 PG — SIGNIFICANT CHANGE UP (ref 27–34)
MCHC RBC-ENTMCNC: 31.4 GM/DL — LOW (ref 32–36)
MCV RBC AUTO: 89.7 FL — SIGNIFICANT CHANGE UP (ref 80–100)
NRBC # BLD: 0 /100 WBCS — SIGNIFICANT CHANGE UP
NRBC # FLD: 0 K/UL — SIGNIFICANT CHANGE UP
PHOSPHATE SERPL-MCNC: 3.9 MG/DL — SIGNIFICANT CHANGE UP (ref 2.5–4.5)
PLATELET # BLD AUTO: 272 K/UL — SIGNIFICANT CHANGE UP (ref 150–400)
POTASSIUM SERPL-MCNC: 4.2 MMOL/L — SIGNIFICANT CHANGE UP (ref 3.5–5.3)
POTASSIUM SERPL-SCNC: 4.2 MMOL/L — SIGNIFICANT CHANGE UP (ref 3.5–5.3)
PROT SERPL-MCNC: 7.6 G/DL — SIGNIFICANT CHANGE UP (ref 6–8.3)
RBC # BLD: 4.19 M/UL — SIGNIFICANT CHANGE UP (ref 3.8–5.2)
RBC # FLD: 15.5 % — HIGH (ref 10.3–14.5)
SODIUM SERPL-SCNC: 137 MMOL/L — SIGNIFICANT CHANGE UP (ref 135–145)
WBC # BLD: 6.22 K/UL — SIGNIFICANT CHANGE UP (ref 3.8–10.5)
WBC # FLD AUTO: 6.22 K/UL — SIGNIFICANT CHANGE UP (ref 3.8–10.5)

## 2020-12-31 PROCEDURE — 99239 HOSP IP/OBS DSCHRG MGMT >30: CPT

## 2020-12-31 RX ORDER — FUROSEMIDE 40 MG
1 TABLET ORAL
Qty: 0 | Refills: 0 | DISCHARGE

## 2020-12-31 RX ADMIN — Medication 81 MILLIGRAM(S): at 13:28

## 2020-12-31 RX ADMIN — ENOXAPARIN SODIUM 40 MILLIGRAM(S): 100 INJECTION SUBCUTANEOUS at 13:28

## 2020-12-31 RX ADMIN — PANTOPRAZOLE SODIUM 40 MILLIGRAM(S): 20 TABLET, DELAYED RELEASE ORAL at 05:25

## 2020-12-31 RX ADMIN — MIRABEGRON 25 MILLIGRAM(S): 50 TABLET, EXTENDED RELEASE ORAL at 13:29

## 2020-12-31 RX ADMIN — LIDOCAINE 1 APPLICATION(S): 4 CREAM TOPICAL at 13:30

## 2020-12-31 RX ADMIN — Medication 1000 MILLIGRAM(S): at 00:06

## 2020-12-31 RX ADMIN — LIDOCAINE 1 APPLICATION(S): 4 CREAM TOPICAL at 05:25

## 2020-12-31 RX ADMIN — CELECOXIB 200 MILLIGRAM(S): 200 CAPSULE ORAL at 13:28

## 2020-12-31 RX ADMIN — Medication 600 MILLIGRAM(S): at 17:20

## 2020-12-31 RX ADMIN — Medication 1000 UNIT(S): at 13:28

## 2020-12-31 RX ADMIN — Medication 600 MILLIGRAM(S): at 05:27

## 2020-12-31 RX ADMIN — Medication 1000 MILLIGRAM(S): at 13:29

## 2020-12-31 RX ADMIN — Medication 1000 MILLIGRAM(S): at 17:20

## 2020-12-31 RX ADMIN — Medication 1000 MILLIGRAM(S): at 05:25

## 2020-12-31 NOTE — CONSULT NOTE ADULT - ASSESSMENT
69yo F w/ right foot avulsion fracture calcaneus  ·	Patient seen & evaluated  ·	Walking as tolerated in surgical shoe as instructed  ·	WBAT to continue  ·	No intervention planned  ·	Follow up in office will have office contact patient to schedule  ·	Re-consult as needed

## 2020-12-31 NOTE — DISCHARGE NOTE NURSING/CASE MANAGEMENT/SOCIAL WORK - PATIENT PORTAL LINK FT
You can access the FollowMyHealth Patient Portal offered by [facility] by registering at the following website: [email or web address]. By joining [employer]’s FollowMyHealth portal, you will also be able to view your health information using other applications (apps) compatible with our system.

## 2020-12-31 NOTE — CONSULT NOTE ADULT - SUBJECTIVE AND OBJECTIVE BOX
Patient is a 70y old  Female who presents with a chief complaint of Confusion x 6 days (31 Dec 2020 10:39)      HPI:  70F, self ambulating, history of HTN, colitis, L hand with index and middle finger fractures and pins placed, experiencing generalized shaking with persistent confusion for the past 6 days. The pt says it is hard for her to recall the events that brought her to the ED and that her  is able to describe the events. The pt admits to fall 2 months ago and sustaining a R ankle fracture, L Parietal CALDERON she describes as a discomfort and constipation, with last BM being 12/25. The patient's , who is currently not present, reported on 12/21/20 while they were sleeping, the pt had generalized shaking and was unresponsive. Pt was noted to have bit her tongue and took a few minutes before she came too. She gradually regained consciousness and was able to ambulate to the bathroom, however he noted that she was confused and not making sense when speaking. The  called the PCP was advised to take the pt to the ED. The pt denies dizziness, blurry vision, dysarthria, dyspnea, cough, chest pain, palpitations, nausea, vomit, diarrhea, abdominal pain, dysuria, chills, generalized body aches.      In the Ed, the pt was seen by neurology. Their consult and recommendations are in the chart.   -CT HEAD: No acute intracranial bleeding. Chronic microvascular ischemic changes.  -CTA BRAIN: Generalized ectasia along the [place], notably of the ICAs, MCAs, and vertebrobasilar system, may reflect uncontrolled hypertension or vasculopathy. No flow-limiting stenosis or occlusion.  -CTA NECK: TA NECK: Patent cervical vasculature. No flow limiting stenosis or occlusion.  -XRAY R Tib & Fib preliminary = no emergent findings.  -XRAY R Ankle preliminary =Cortical step off along the distal aspect of the fifth metatarsal best seen on the frontal view of the right foot which may represent a chronic fracture. Recommend correlation with point tenderness and focused radiographs of the right foot if there is concern for acute fracture in that region.  -EKG NSR @ 83b/ min, QT/ QTC= 382/ 448.  -COVID PCR Not detected.     Vitals : T max = 98* oral, HR= 81b/ min, BP = 143/ 84, RR= 16b/ min, SPO2= 100%ra        (27 Dec 2020 04:11)      PAST MEDICAL & SURGICAL HISTORY:  H/O finger fracture  L hand Pinning to 2 and 3rd fingers.    Hypertension    Colitis    S/P knee replacement  Left    H/O myomectomy    H/O finger fracture  L hand pinning to 2nfd nd 3rd finger.        MEDICATIONS  (STANDING):  aspirin enteric coated 81 milliGRAM(s) Oral daily  atorvastatin 80 milliGRAM(s) Oral at bedtime  celecoxib 200 milliGRAM(s) Oral daily  cholecalciferol 1000 Unit(s) Oral daily  enoxaparin Injectable 40 milliGRAM(s) SubCutaneous daily  gemfibrozil 600 milliGRAM(s) Oral two times a day  influenza  Vaccine (HIGH DOSE) 0.7 milliLiter(s) IntraMuscular once  lidocaine 2% Gel 1 Application(s) Topical three times a day  mesalamine ER Capsule 1000 milliGRAM(s) Oral four times a day  mirabegron ER 25 milliGRAM(s) Oral daily  pantoprazole    Tablet 40 milliGRAM(s) Oral before breakfast  polyethylene glycol 3350 17 Gram(s) Oral daily    MEDICATIONS  (PRN):      Allergies    No Known Allergies    Intolerances        VITALS:    Vital Signs Last 24 Hrs  T(C): 36.7 (31 Dec 2020 05:21), Max: 37.1 (30 Dec 2020 21:03)  T(F): 98 (31 Dec 2020 05:21), Max: 98.7 (30 Dec 2020 21:03)  HR: 66 (31 Dec 2020 05:21) (66 - 75)  BP: 120/69 (31 Dec 2020 05:21) (120/69 - 130/75)  BP(mean): --  RR: 17 (31 Dec 2020 05:21) (17 - 17)  SpO2: 98% (31 Dec 2020 05:21) (98% - 100%)    LABS:                          11.8   6.22  )-----------( 272      ( 31 Dec 2020 06:38 )             37.6       12-31    137  |  102  |  17  ----------------------------<  83  4.2   |  24  |  0.81    Ca    10.7<H>      31 Dec 2020 06:38  Phos  3.9     12-31  Mg     2.2     12-31    TPro  7.6  /  Alb  4.3  /  TBili  0.2  /  DBili  x   /  AST  48<H>  /  ALT  38<H>  /  AlkPhos  129<H>  12-31      CAPILLARY BLOOD GLUCOSE              LOWER EXTREMITY PHYSICAL EXAM:    Vascular: DP/PT 2/4, B/L, CFT <3 seconds B/L, Temperature gradient WNL, B/L.   Neuro: Epicritic sensation intact to the level of digits, B/L.  Musculoskeletal/Ortho: pain on palpation to lateral aspect calcaneus  Skin: mild edema to dorsolateral foot right foot, no open lesions, no erythema, no acute signs of infection      RADIOLOGY & ADDITIONAL STUDIES:    < from: Xray Foot AP + Lateral, Right (12.28.20 @ 11:46) >    EXAM:  RAD FOOT 2 VIEWS RIGHT        PROCEDURE DATE:  Dec 28 2020         INTERPRETATION:  CLINICAL INDICATION: right foot pain; questionable 5th metatarsal fracture    EXAM:  Frontal and lateral right foot from 12/28/2020 at 1146. Reviewed in conjunction with right ankle radiographs from 12/26/2020.    IMPRESSION:  Old healed distal 5th metatarsal fracture deformity.    Thin linear sliver of ossific density adjacent to anterolateral calcaneal cortex suspicious for an avulsion fracture at the extensor digitorum brevis tendon attachment site, correlate for point tenderness at mid point between lateral malleolus and 5th metatarsal tuberosity. No dislocations or additional suspected fractures.    Tarsometatarsal alignment maintained without evidence for a Lisfranc injury.    Appearance of an old 5th PIP joint arthroplasty defect with well corticated articular margins. Preserved remaining joint spaces and no joint margin erosions.    Tiny plantar calcaneal enthesophyte.    Generalized mild osteopenia otherwise no discrete lytic or blastic lesions.              NACHO TELLO MD; Attending Radiologist  This document has been electronically signed. Dec 28 2020 12:34PM    < end of copied text >

## 2020-12-31 NOTE — PROGRESS NOTE ADULT - ASSESSMENT
71 yo F w/ HTN, colitis (on mesalamine) presenting w/ confusion, generalized shaking and poor coordination, currently undergoing further neurological workup.     # Ataxia, shaking, confusion  - differential includes possible R cerebellar infarct vs seizure activity/post ictal state  - neuro input appreciated  - CT head with chronic ischemic changes, ectasias possibly 2/2 hypertension  - monitoring off AEDs at this time as per neuro, spot EEG obtained without evidence of epileptiform activity  - MRI negative for acute infarct  - c/w asa/statin, stroke risk factor modifications  - echo w/ bubble study showing normal LV function, no e/o PFO  - s/p monitoring on telemetry  - patient reports she has returned to baseline  - will f/u further neuro recs, anticipate can d/c home tomorrow    # Essential HTN  - s/p period of permissive HTN, can likely resume home amlodipine as needed, though current BP in acceptable range (SBP 120s)    # Colitis  - stable on mesalamine    # Foot fracture  - does not appear to be impeding ambulation, denies pain at this time  - appreciate podiatry input, ace wrap w/ surgical shoe, WBAT to heel, recommend otpt podiatry followup w/ Dr. Joseph Waterhouse ([phone])    DVT ppx; lovenox    Dispo: PT/OT no skilled PT needs, PMR eval appreciated, may benefit from otpt PT for balance, radicular back pain, anticipate d/c home tomorrow if no neuro objection  
71 yo F w/ HTN, colitis (on mesalamine) presenting w/ confusion, generalized shaking and poor coordination, currently undergoing further neurological workup.     # Ataxia, shaking, confusion  - differential includes possible R cerebellar infarct vs seizure activity/post ictal state  - neuro input appreciated  - CT head with chronic ischemic changes, ectasias possibly 2/2 hypertension  - pending 24hr EEG to investigate possibility of seizure (monitor off AEDs at this time as per neuro)  - pending MRI to rule out CVA (appreciate staff assistance w/ expediting study)  - c/w asa/statin, stroke risk factor modifications  - echo w/ bubble study showing normal LV function, no e/o PFO  - monitoring on telemetry  - patient reports she has returned to baseline    # Essential HTN  - s/p period of permissive HTN, can likely resume home amlodipine as needed, though current BP in acceptable range    # Colitis  - stable on mesalamine    # Foot fracture  - does not appear to be impeding ambulation, denies pain at this time  - appreciate podiatry input, ace wrap w/ surgical show, WBAT to heel, recommend otpt podiatry followup w/ Dr. Joseph Waterhouse ([phone])    DVT ppx; lovenox    Dispo: PT/OT no skilled PT needs, PMR eval appreciated, may benefit from otpt PT for balance, radicular back pain        
69 yo F w/ HTN, colitis (on mesalamine) presenting w/ confusion, generalized shaking and poor coordination, currently undergoing further neurological workup.     # Ataxia, shaking, confusion  - differential includes possible R cerebellar infarct vs seizure activity/post ictal state  - neuro input appreciated  - CT head with chronic ischemic changes, ectasias possibly 2/2 hypertension  - pending 24hr EEG to investigate possibility of seizure (monitor off AEDs at this time as per neuro)  - pending MRI to rule out CVA  - c/w asa/statin, stroke risk factor modifications  - echo w/ bubble study showing normal LV function, no e/o PFO  - monitoring on telemetry  - patient reports she has returned to baseline    # Essential HTN  - s/p period of permissive HTN, can likely resume home amlodipine as needed, though current BP in acceptable range    # Colitis  - stable on mesalamine    # Foot fracture  - does not appear to be impeding ambulation, denies pain at this time  - will consider podiatry followup    DVT ppx; lovenox    Dispo: PT/OT no skilled PT needs, PMR eval appreciated, may benefit from otpt PT for balance, radicular back pain
69 yo F w/ HTN, colitis (on mesalamine) presenting w/ confusion, generalized shaking and poor coordination, symptoms resolved, MRI w/o evidence of acute infarct, EEG negative for epileptiform activity.      # Ataxia, shaking, confusion  - differential includes possible R cerebellar infarct vs seizure activity/post ictal state  - neuro input appreciated  - CT head with chronic ischemic changes, ectasias possibly 2/2 hypertension  - monitoring off AEDs at this time as per neuro, spot EEG obtained without evidence of epileptiform activity  - MRI negative for acute infarct  - echo w/ bubble study showing normal LV function, no e/o PFO  - s/p monitoring on telemetry  - patient reports she has returned to baseline  - appreciate neuro input, no contraindication to discharge, can f/u w/ Dr. Jiménez as outpatient    # Essential HTN  - s/p period of permissive HTN, can likely resume home amlodipine upon discharge though current BP in acceptable range (SBP 120s)  - to f/u PMD Dr. Morel for further BP management    # Colitis  - stable on mesalamine    # Foot fracture  - does not appear to be impeding ambulation, denies pain at this time  - appreciate podiatry input, ace wrap w/ surgical shoe, WBAT to heel, recommend otpt podiatry followup w/ Dr. Joseph Waterhouse ([phone])    DVT ppx; lovenox    Dispo: PT/OT no skilled PT needs, PMR eval appreciated, may benefit from otpt PT for balance, radicular back pain, no neuro contraindication to discharge    Case d/w neuro on day of discharge, patient in agreement with discharge plan, counseled on outpatient neuro and podiatry followup, anticipatory guidance provided.     Discharge time 31 minutes      
70F with HTN, Colitis admitted for generalized shaking and poor coordination possibly due R cerebellar dysfunction from ischemic CVA of unknown etiology vs. seizure activity vs. NPH in the setting of urinary frequency.

## 2020-12-31 NOTE — PROGRESS NOTE ADULT - SUBJECTIVE AND OBJECTIVE BOX
[facility] Medicine  Pager [phone]    Patient is a 70y old  Female who presents with a chief complaint of Confusion x 6 days (30 Dec 2020 10:35)      INTERVAL HPI/OVERNIGHT EVENTS:    MEDICATIONS  (STANDING):  aspirin enteric coated 81 milliGRAM(s) Oral daily  atorvastatin 80 milliGRAM(s) Oral at bedtime  celecoxib 200 milliGRAM(s) Oral daily  cholecalciferol 1000 Unit(s) Oral daily  enoxaparin Injectable 40 milliGRAM(s) SubCutaneous daily  gemfibrozil 600 milliGRAM(s) Oral two times a day  influenza  Vaccine (HIGH DOSE) 0.7 milliLiter(s) IntraMuscular once  lidocaine 2% Gel 1 Application(s) Topical three times a day  mesalamine ER Capsule 1000 milliGRAM(s) Oral four times a day  mirabegron ER 25 milliGRAM(s) Oral daily  pantoprazole    Tablet 40 milliGRAM(s) Oral before breakfast  polyethylene glycol 3350 17 Gram(s) Oral daily    MEDICATIONS  (PRN):      Allergies    No Known Allergies    Intolerances        REVIEW OF SYSTEMS:  Please see interval HPI:    Vital Signs Last 24 Hrs  T(C): 36.7 (31 Dec 2020 05:21), Max: 37.1 (30 Dec 2020 21:03)  T(F): 98 (31 Dec 2020 05:21), Max: 98.7 (30 Dec 2020 21:03)  HR: 66 (31 Dec 2020 05:21) (66 - 75)  BP: 120/69 (31 Dec 2020 05:21) (120/69 - 130/75)  BP(mean): --  RR: 17 (31 Dec 2020 05:21) (17 - 17)  SpO2: 98% (31 Dec 2020 05:21) (98% - 100%)  I&O's Detail        PHYSICAL EXAM:  GENERAL:   HEAD:    EYES:   ENMT:   NECK:   NERVOUS SYSTEM:    CHEST/LUNG:   HEART:   ABDOMEN:   EXTREMITIES:    LYMPH:   SKIN:     LABS:                        11.8   6.22  )-----------( 272      ( 31 Dec 2020 06:38 )             37.6     31 Dec 2020 06:38    137    |  102    |  17     ----------------------------<  83     4.2     |  24     |  0.81     Ca    10.7       31 Dec 2020 06:38  Phos  3.9       31 Dec 2020 06:38  Mg     2.2       31 Dec 2020 06:38    TPro  7.6    /  Alb  4.3    /  TBili  0.2    /  DBili  x      /  AST  48     /  ALT  38     /  AlkPhos  129    31 Dec 2020 06:38      CAPILLARY BLOOD GLUCOSE        BLOOD CULTURE    RADIOLOGY & ADDITIONAL TESTS:    Imaging Personally Reviewed:  [ ] YES     Consultant(s) Notes Reviewed:      Care Discussed with Consultants/Other Providers: [facility]  Pager [phone]    Patient is a 70y old  Female who presents with a chief complaint of Confusion x 6 days (30 Dec 2020 10:35)      INTERVAL HPI/OVERNIGHT EVENTS:  Feels well, no complaints, at her baseline. Happy to hear about EEG results (negative for epileptic activity). No neuro contraindication to discharge. Counseled patient on outpatient neurology and podiatry followup, anticipatory guidance provided, patient in agreement w/ discharge plan.     MEDICATIONS  (STANDING):  aspirin enteric coated 81 milliGRAM(s) Oral daily  atorvastatin 80 milliGRAM(s) Oral at bedtime  celecoxib 200 milliGRAM(s) Oral daily  cholecalciferol 1000 Unit(s) Oral daily  enoxaparin Injectable 40 milliGRAM(s) SubCutaneous daily  gemfibrozil 600 milliGRAM(s) Oral two times a day  influenza  Vaccine (HIGH DOSE) 0.7 milliLiter(s) IntraMuscular once  lidocaine 2% Gel 1 Application(s) Topical three times a day  mesalamine ER Capsule 1000 milliGRAM(s) Oral four times a day  mirabegron ER 25 milliGRAM(s) Oral daily  pantoprazole    Tablet 40 milliGRAM(s) Oral before breakfast  polyethylene glycol 3350 17 Gram(s) Oral daily    MEDICATIONS  (PRN):    Allergies  No Known Allergies    Intolerances    REVIEW OF SYSTEMS:  Please see interval HPI:    Vital Signs Last 24 Hrs  T(C): 36.7 (31 Dec 2020 05:21), Max: 37.1 (30 Dec 2020 21:03)  T(F): 98 (31 Dec 2020 05:21), Max: 98.7 (30 Dec 2020 21:03)  HR: 66 (31 Dec 2020 05:21) (66 - 75)  BP: 120/69 (31 Dec 2020 05:21) (120/69 - 130/75)  BP(mean): --  RR: 17 (31 Dec 2020 05:21) (17 - 17)  SpO2: 98% (31 Dec 2020 05:21) (98% - 100%)  I&O's Detail    PHYSICAL EXAM:  GENERAL: NAD, sitting on edge of bed  HEAD:  NC/AT  EYES: EOMI, clear sclera/conjunctiva  ENMT: MMM  NECK: supple  NERVOUS SYSTEM: non-focal, moving all extremities    CHEST/LUNG: Comfortable on RA, speaking in full sentences  HEART: S1S2 RRR  ABDOMEN: soft, non-tender  EXTREMITIES:  no c/c/e, R foot in ACE wrap, surgical shoe    LABS:                        11.8   6.22  )-----------( 272      ( 31 Dec 2020 06:38 )             37.6     31 Dec 2020 06:38    137    |  102    |  17     ----------------------------<  83     4.2     |  24     |  0.81     Ca    10.7       31 Dec 2020 06:38  Phos  3.9       31 Dec 2020 06:38  Mg     2.2       31 Dec 2020 06:38    TPro  7.6    /  Alb  4.3    /  TBili  0.2    /  DBili  x      /  AST  48     /  ALT  38     /  AlkPhos  129    31 Dec 2020 06:38    CAPILLARY BLOOD GLUCOSE    BLOOD CULTURE    RADIOLOGY & ADDITIONAL TESTS:    Imaging Personally Reviewed:  [ ] YES     Consultant(s) Notes Reviewed:  Neuro    Care Discussed with Consultants/Other Providers: Neuro Dr. Reyes  re: results of EEG, no contraindication to discharge, can f/u Dr. Jiménez as outpatient

## 2020-12-31 NOTE — CHART NOTE - NSCHARTNOTEFT_GEN_A_CORE
Case discussed at length with Neurology Attending Dr. Jiménez.    < from: MR Head No Cont (12.30.20 @ 00:28) >    IMPRESSION:    No acute infarct. Age-appropriate involutional changes and mild microvascular ischemic change.    < end of copied text >    EEG SUMMARY/CLASSIFICATION    Normal EEG in the awake, drowsy and asleep states.  _____________________________________________________________  EEG IMPRESSION/CLINICAL CORRELATE    Normal EEG study.  No epileptiform pattern or seizure seen.    Recommendations:  [ ] at this time, no neurological contraindication for discharge  [ ] no need to start AEDs at this time  [ ] may discontinue aspirin at this time unless other indication warrants aspirin  [ ] patient may follow up with Dr. Jiménez as outpatient  [phone]    d/w Neurology Attending Dr. Jiménez, primary team

## 2021-03-07 ENCOUNTER — INPATIENT (INPATIENT)
Facility: HOSPITAL | Age: 71
LOS: 3 days | Discharge: HOME | End: 2021-03-11
Attending: HOSPITALIST | Admitting: HOSPITALIST
Payer: MEDICARE

## 2021-03-07 VITALS
SYSTOLIC BLOOD PRESSURE: 139 MMHG | OXYGEN SATURATION: 100 % | RESPIRATION RATE: 18 BRPM | HEART RATE: 98 BPM | TEMPERATURE: 98 F | DIASTOLIC BLOOD PRESSURE: 89 MMHG | HEIGHT: 65 IN

## 2021-03-07 DIAGNOSIS — Z29.9 ENCOUNTER FOR PROPHYLACTIC MEASURES, UNSPECIFIED: ICD-10-CM

## 2021-03-07 DIAGNOSIS — K51.90 ULCERATIVE COLITIS, UNSPECIFIED, WITHOUT COMPLICATIONS: ICD-10-CM

## 2021-03-07 DIAGNOSIS — Z87.81 PERSONAL HISTORY OF (HEALED) TRAUMATIC FRACTURE: Chronic | ICD-10-CM

## 2021-03-07 DIAGNOSIS — I10 ESSENTIAL (PRIMARY) HYPERTENSION: ICD-10-CM

## 2021-03-07 DIAGNOSIS — K21.9 GASTRO-ESOPHAGEAL REFLUX DISEASE WITHOUT ESOPHAGITIS: ICD-10-CM

## 2021-03-07 DIAGNOSIS — D64.9 ANEMIA, UNSPECIFIED: ICD-10-CM

## 2021-03-07 DIAGNOSIS — R56.9 UNSPECIFIED CONVULSIONS: ICD-10-CM

## 2021-03-07 DIAGNOSIS — M19.90 UNSPECIFIED OSTEOARTHRITIS, UNSPECIFIED SITE: ICD-10-CM

## 2021-03-07 DIAGNOSIS — Z98.890 OTHER SPECIFIED POSTPROCEDURAL STATES: Chronic | ICD-10-CM

## 2021-03-07 DIAGNOSIS — Z96.659 PRESENCE OF UNSPECIFIED ARTIFICIAL KNEE JOINT: Chronic | ICD-10-CM

## 2021-03-07 LAB
ALBUMIN SERPL ELPH-MCNC: 4.2 G/DL — SIGNIFICANT CHANGE UP (ref 3.3–5)
ALP SERPL-CCNC: 106 U/L — SIGNIFICANT CHANGE UP (ref 40–120)
ALT FLD-CCNC: 24 U/L — SIGNIFICANT CHANGE UP (ref 4–33)
AMMONIA BLD-MCNC: 29 UMOL/L — SIGNIFICANT CHANGE UP (ref 11–55)
ANION GAP SERPL CALC-SCNC: 11 MMOL/L — SIGNIFICANT CHANGE UP (ref 7–14)
APPEARANCE UR: CLEAR — SIGNIFICANT CHANGE UP
AST SERPL-CCNC: 26 U/L — SIGNIFICANT CHANGE UP (ref 4–32)
BASOPHILS # BLD AUTO: 0.02 K/UL — SIGNIFICANT CHANGE UP (ref 0–0.2)
BASOPHILS NFR BLD AUTO: 0.3 % — SIGNIFICANT CHANGE UP (ref 0–2)
BILIRUB SERPL-MCNC: <0.2 MG/DL — SIGNIFICANT CHANGE UP (ref 0.2–1.2)
BILIRUB UR-MCNC: NEGATIVE — SIGNIFICANT CHANGE UP
BUN SERPL-MCNC: 11 MG/DL — SIGNIFICANT CHANGE UP (ref 7–23)
CALCIUM SERPL-MCNC: 9.9 MG/DL — SIGNIFICANT CHANGE UP (ref 8.4–10.5)
CHLORIDE SERPL-SCNC: 103 MMOL/L — SIGNIFICANT CHANGE UP (ref 98–107)
CK SERPL-CCNC: 468 U/L — HIGH (ref 25–170)
CO2 SERPL-SCNC: 25 MMOL/L — SIGNIFICANT CHANGE UP (ref 22–31)
COLOR SPEC: YELLOW — SIGNIFICANT CHANGE UP
CREAT SERPL-MCNC: 0.66 MG/DL — SIGNIFICANT CHANGE UP (ref 0.5–1.3)
DIFF PNL FLD: NEGATIVE — SIGNIFICANT CHANGE UP
EOSINOPHIL # BLD AUTO: 0.18 K/UL — SIGNIFICANT CHANGE UP (ref 0–0.5)
EOSINOPHIL NFR BLD AUTO: 2.7 % — SIGNIFICANT CHANGE UP (ref 0–6)
GLUCOSE SERPL-MCNC: 96 MG/DL — SIGNIFICANT CHANGE UP (ref 70–99)
GLUCOSE UR QL: NEGATIVE — SIGNIFICANT CHANGE UP
HCT VFR BLD CALC: 33.8 % — LOW (ref 34.5–45)
HGB BLD-MCNC: 11 G/DL — LOW (ref 11.5–15.5)
IANC: 4.77 K/UL — SIGNIFICANT CHANGE UP (ref 1.5–8.5)
IMM GRANULOCYTES NFR BLD AUTO: 0.2 % — SIGNIFICANT CHANGE UP (ref 0–1.5)
KETONES UR-MCNC: NEGATIVE — SIGNIFICANT CHANGE UP
LEUKOCYTE ESTERASE UR-ACNC: NEGATIVE — SIGNIFICANT CHANGE UP
LYMPHOCYTES # BLD AUTO: 1.16 K/UL — SIGNIFICANT CHANGE UP (ref 1–3.3)
LYMPHOCYTES # BLD AUTO: 17.5 % — SIGNIFICANT CHANGE UP (ref 13–44)
MAGNESIUM SERPL-MCNC: 2.3 MG/DL — SIGNIFICANT CHANGE UP (ref 1.6–2.6)
MCHC RBC-ENTMCNC: 28 PG — SIGNIFICANT CHANGE UP (ref 27–34)
MCHC RBC-ENTMCNC: 32.5 GM/DL — SIGNIFICANT CHANGE UP (ref 32–36)
MCV RBC AUTO: 86 FL — SIGNIFICANT CHANGE UP (ref 80–100)
MONOCYTES # BLD AUTO: 0.5 K/UL — SIGNIFICANT CHANGE UP (ref 0–0.9)
MONOCYTES NFR BLD AUTO: 7.5 % — SIGNIFICANT CHANGE UP (ref 2–14)
NEUTROPHILS # BLD AUTO: 4.77 K/UL — SIGNIFICANT CHANGE UP (ref 1.8–7.4)
NEUTROPHILS NFR BLD AUTO: 71.8 % — SIGNIFICANT CHANGE UP (ref 43–77)
NITRITE UR-MCNC: NEGATIVE — SIGNIFICANT CHANGE UP
NRBC # BLD: 0 /100 WBCS — SIGNIFICANT CHANGE UP
NRBC # FLD: 0 K/UL — SIGNIFICANT CHANGE UP
PH UR: 6.5 — SIGNIFICANT CHANGE UP (ref 5–8)
PHOSPHATE SERPL-MCNC: 2.7 MG/DL — SIGNIFICANT CHANGE UP (ref 2.5–4.5)
PLATELET # BLD AUTO: 277 K/UL — SIGNIFICANT CHANGE UP (ref 150–400)
POTASSIUM SERPL-MCNC: 3.8 MMOL/L — SIGNIFICANT CHANGE UP (ref 3.5–5.3)
POTASSIUM SERPL-SCNC: 3.8 MMOL/L — SIGNIFICANT CHANGE UP (ref 3.5–5.3)
PROT SERPL-MCNC: 7.1 G/DL — SIGNIFICANT CHANGE UP (ref 6–8.3)
PROT UR-MCNC: ABNORMAL
RBC # BLD: 3.93 M/UL — SIGNIFICANT CHANGE UP (ref 3.8–5.2)
RBC # FLD: 15 % — HIGH (ref 10.3–14.5)
SARS-COV-2 RNA SPEC QL NAA+PROBE: SIGNIFICANT CHANGE UP
SODIUM SERPL-SCNC: 139 MMOL/L — SIGNIFICANT CHANGE UP (ref 135–145)
SP GR SPEC: 1.02 — SIGNIFICANT CHANGE UP (ref 1.01–1.02)
UROBILINOGEN FLD QL: SIGNIFICANT CHANGE UP
WBC # BLD: 6.64 K/UL — SIGNIFICANT CHANGE UP (ref 3.8–10.5)
WBC # FLD AUTO: 6.64 K/UL — SIGNIFICANT CHANGE UP (ref 3.8–10.5)

## 2021-03-07 PROCEDURE — 99285 EMERGENCY DEPT VISIT HI MDM: CPT

## 2021-03-07 PROCEDURE — 99223 1ST HOSP IP/OBS HIGH 75: CPT

## 2021-03-07 PROCEDURE — 93970 EXTREMITY STUDY: CPT | Mod: 26

## 2021-03-07 PROCEDURE — 70450 CT HEAD/BRAIN W/O DYE: CPT | Mod: 26

## 2021-03-07 RX ORDER — CHOLECALCIFEROL (VITAMIN D3) 125 MCG
1000 CAPSULE ORAL DAILY
Refills: 0 | Status: DISCONTINUED | OUTPATIENT
Start: 2021-03-07 | End: 2021-03-11

## 2021-03-07 RX ORDER — MIRABEGRON 50 MG/1
1 TABLET, EXTENDED RELEASE ORAL
Qty: 0 | Refills: 0 | DISCHARGE

## 2021-03-07 RX ORDER — AMLODIPINE BESYLATE 2.5 MG/1
5 TABLET ORAL DAILY
Refills: 0 | Status: DISCONTINUED | OUTPATIENT
Start: 2021-03-07 | End: 2021-03-11

## 2021-03-07 RX ORDER — PANTOPRAZOLE SODIUM 20 MG/1
40 TABLET, DELAYED RELEASE ORAL
Refills: 0 | Status: DISCONTINUED | OUTPATIENT
Start: 2021-03-07 | End: 2021-03-11

## 2021-03-07 RX ORDER — ENOXAPARIN SODIUM 100 MG/ML
40 INJECTION SUBCUTANEOUS EVERY 24 HOURS
Refills: 0 | Status: DISCONTINUED | OUTPATIENT
Start: 2021-03-07 | End: 2021-03-11

## 2021-03-07 RX ORDER — ACETAMINOPHEN 500 MG
650 TABLET ORAL EVERY 6 HOURS
Refills: 0 | Status: DISCONTINUED | OUTPATIENT
Start: 2021-03-07 | End: 2021-03-11

## 2021-03-07 RX ORDER — MESALAMINE 400 MG
800 TABLET, DELAYED RELEASE (ENTERIC COATED) ORAL EVERY 8 HOURS
Refills: 0 | Status: DISCONTINUED | OUTPATIENT
Start: 2021-03-07 | End: 2021-03-11

## 2021-03-07 RX ORDER — GEMFIBROZIL 600 MG
600 TABLET ORAL
Refills: 0 | Status: DISCONTINUED | OUTPATIENT
Start: 2021-03-07 | End: 2021-03-11

## 2021-03-07 RX ORDER — SODIUM CHLORIDE 9 MG/ML
1000 INJECTION INTRAMUSCULAR; INTRAVENOUS; SUBCUTANEOUS ONCE
Refills: 0 | Status: COMPLETED | OUTPATIENT
Start: 2021-03-07 | End: 2021-03-07

## 2021-03-07 RX ADMIN — Medication 800 MILLIGRAM(S): at 21:47

## 2021-03-07 RX ADMIN — ENOXAPARIN SODIUM 40 MILLIGRAM(S): 100 INJECTION SUBCUTANEOUS at 21:16

## 2021-03-07 RX ADMIN — SODIUM CHLORIDE 1000 MILLILITER(S): 9 INJECTION INTRAMUSCULAR; INTRAVENOUS; SUBCUTANEOUS at 10:44

## 2021-03-07 RX ADMIN — Medication 600 MILLIGRAM(S): at 19:46

## 2021-03-07 NOTE — ED PROVIDER NOTE - ATTENDING CONTRIBUTION TO CARE
agree with above hpi  on my exam  vital signs: T(C): 36.7 (03-07-21 @ 09:44), Max: 36.7 (03-07-21 @ 09:44)  HR: 98 (03-07-21 @ 09:44) (98 - 98)  BP: 139/89 (03-07-21 @ 09:44) (139/89 - 139/89)  BP(mean): --  RR: 18 (03-07-21 @ 09:44) (18 - 18)  SpO2: 100% (03-07-21 @ 09:44) (100% - 100%)  GEN - NAD; well appearing; A+O x3   HEAD - NC/AT   EYES- PERRL, EOMI  ENT: Airway patent, mmm, Oral cavity and pharynx normal. +tongue abrasion to Left lateral aspect of tongue  No inflammation, swelling, exudate, or lesions.    NECK: Neck supple, no masses.  PULMONARY - CTA b/l, symmetric breath sounds.   CARDIAC -s1s2, RRR, no M,G,R  ABDOMEN - +BS, ND, NT, soft, no guarding, no rebound, no masses   BACK - no CVA tenderness, Normal  spine   EXTREMITIES - FROM, symmetric pulses, capillary refill < 2 seconds, no edema   SKIN - no rash or bruising   NEUROLOGIC - ao3, cn2-12 intact, 5/5 strength in all extremities, sensation grossly intact, f-n nl, no dysdiadochokinesia, gait steady, rhomberg negative.  PSYCH -nl mood/affect, nl insight.  a/p-patient presents to the ed with seizure like activity witnessed by  this morning, generalized shaking, was not awake for this, woke up and now feeling back to baseline. Denies any ha, vision changes, weakness, numbness, fevers, neck pain, cough, cp, sob, abd pain, diarrhea. Has had significant increase in frequency of urine x last few weeks, and intermittent b/l le edema R>L. Neuro exam wnl, nontoxic appearing, vs ok. Patient had h/o similar episode in December, seen by neuro and had eeg with no epileptiform activity, told to Plan for labs, ua, ct brain, neuro eval-eval for elec disturbance, organ dysfunction, uti, mass, reass.

## 2021-03-07 NOTE — ED ADULT NURSE REASSESSMENT NOTE - NS ED NURSE REASSESS COMMENT FT1
Report received from previous shift RN, pt is AOX4, NSR on cardiac monitor, resting comfortably. Awaiting admitting team eval. Will continue to monitor

## 2021-03-07 NOTE — ED PROVIDER NOTE - NS ED ROS FT
General: denies fever, chills, weight loss/weight gain.  HENT: denies nasal congestion, sore throat, rhinorrhea, ear pain.  Eyes: denies visual changes, blurred vision, eye discharge, eye redness.  Neck: denies neck pain, neck swelling.  CV: denies chest pain, palpitations.  Resp: denies difficulty breathing, cough.  Abdominal: denies nausea, vomiting, diarrhea, abdominal pain, blood in stool, dark stool.  MSK: left leg swelling; denies muscle aches, bony pain, leg pain.  Neuro: denies headaches, numbness, tingling, dizziness, lightheadedness.  Skin: denies rashes, cuts, bruises.  Hematologic: denies unexplained bruises.

## 2021-03-07 NOTE — ED PROVIDER NOTE - PSH
H/O finger fracture  L hand pinning to 2nfd nd 3rd finger.  H/O myomectomy    S/P knee replacement  Left

## 2021-03-07 NOTE — H&P ADULT - ASSESSMENT
69 yo female PMHx HTN, GERD, Osteoporosis, OA and UC p/w an episode of convulsions followed by confusion this morning, admitted to medicine for Seizure.    VTE Risk Assess calculator  NOT WORKING: showing error. IT called.

## 2021-03-07 NOTE — ED PROVIDER NOTE - PROGRESS NOTE DETAILS
[place], PGY3- son Cornel [phone] Wander PGY2 - Labs and CT unremarkable.  Neuro states pt. may benefit from a 24-hr EEG.  Will admit to hospitalist.  Pt. aware and agrees w/ plan.  All other questions and concerns have been addressed. Wander PGY2 - Labs and CT unremarkable.  Neuro states pt. may benefit from a 24-hr EEG.  Will admit to hospitalist.  Pt. aware, results discussed, and agrees w/ plan.  All other questions and concerns have been addressed.

## 2021-03-07 NOTE — ED ADULT NURSE NOTE - CHIEF COMPLAINT QUOTE
pts  states wife had seizure in bed this/ morning and she bit her tongue/  states she was admitted recently for the same  pt not on sz meds/ pt did not know what happen/   states pt cant hold her urine. has frequency  [phone] Jason Philip

## 2021-03-07 NOTE — H&P ADULT - PROBLEM SELECTOR PLAN 1
Seizure precautions  -House neuro consulted  -Ativan IV 2mg if convulsion re-occur for >3 mins   -24hr Video EEG  -obtain ammonia, CK

## 2021-03-07 NOTE — H&P ADULT - NSHPLABSRESULTS_GEN_ALL_CORE
COVID: neg  CT Head w/o con:   B/L LE doppler: No acute intracranial findings. Chronic small vessel disease.  EKG: NSR @ 90 bpm Poor R-wave progression V1-V6.                        11.0   6.64  )-----------( 277      ( 07 Mar 2021 10:47 )             33.8     03-07    139  |  103  |  11  ----------------------------<  96  3.8   |  25  |  0.66    Ca    9.9      07 Mar 2021 10:47  Phos  2.7     03-07  Mg     2.3     03-07    TPro  7.1  /  Alb  4.2  /  TBili  <0.2  /  DBili  x   /  AST  26  /  ALT  24  /  AlkPhos  106  03-07      Urinalysis Basic - ( 07 Mar 2021 10:47 )    Color: Yellow / Appearance: Clear / S.019 / pH: x  Gluc: x / Ketone: Negative  / Bili: Negative / Urobili: <2 mg/dL   Blood: x / Protein: Trace / Nitrite: Negative   Leuk Esterase: Negative / RBC: x / WBC x   Sq Epi: x / Non Sq Epi: x / Bacteria: x        LIVER FUNCTIONS - ( 07 Mar 2021 10:47 )  Alb: 4.2 g/dL / Pro: 7.1 g/dL / ALK PHOS: 106 U/L / ALT: 24 U/L / AST: 26 U/L / GGT: x

## 2021-03-07 NOTE — H&P ADULT - RS GEN PE MLT RESP DETAILS PC
airway patent/breath sounds equal/respirations non-labored/clear to auscultation bilaterally/no chest wall tenderness/no rales/no rhonchi/no wheezes

## 2021-03-07 NOTE — ED ADULT TRIAGE NOTE - CHIEF COMPLAINT QUOTE
pts  states wife had seizure in bed this/ morning and she bit her tongue/  states she was admitted recently for the same  pt not on sz meds/ pt did not know what happen/   states pt cant hold her urine. has frequency  [phone] Jason Philip

## 2021-03-07 NOTE — ED ADULT NURSE NOTE - OBJECTIVE STATEMENT
Pt rec'd in 23, from home s/p seizure-like activity witnessed by her . Pt awake and alert, A&Ox4. Pt admitted for similar episode in Dec, not on seizure medications. Pt denies any pain, denies weakness, states she feels back to her usual thing. Pt doesn't recall the incident. Pt c/o urinary frequency for the past few days without dysuria. Denies fevers.

## 2021-03-07 NOTE — H&P ADULT - ATTENDING COMMENTS
80 y.o. F w/ a hx of UC, HTN who presents after her  found her convulsing in bed followed by a period of confusion. Patient had a similar presentation in Dec 2002- c/f stroke v seizure. Spot EEG and MRI brain were both negative, patient was discharged home w/ OP Neuro follow up. Patient also noted to be confused on admission that resolved w/ hospitalization. Since then patient and her  note that she is more forgetful. Patient was in her USOH before bed,  noted she had a convulsive episode that lasted 5 minutes, she woke up confused and went back to sleep snoring for 15 minutes before she could be aroused again. Convulsions were associated with tongue laceration. Since then patient has been mildly confused. Pt does endorse stress w/ recent death in family. Patient denies any f/c, sinus congestion, headaches, vision changes, SOB, CP, abdominal pain, diarrhea, dysuria, rashes or new joint pain. Patient does endorse urinary urgency and frequency x few months.   Of note, patient reports L breast is larger than R breast x 2-3 years and has painful lumps. Patient has had a mammogram since her breast has enlarged which was normal.     # Convulsions: MRI brain negative last admission. Agree with vEEG for 24 hours. Patient has persistent mild confusion- may have extended post-ictal period 2/2 age. Unclear precipitating factor, no offending medications. Neurology following   # L breast pain: Exam notable for some focal areas of tenderness. Advised patient to f/u GYN or breast surgeon OP.     I have personally seen, examined and participated in the care of this patient. I have reviewed all pertinent clinical information, including history, physical exam, plan and the NP/PA's note and agree except as noted.

## 2021-03-07 NOTE — H&P ADULT - NSICDXPASTMEDICALHX_GEN_ALL_CORE_FT
PAST MEDICAL HISTORY:  Anemia     Chronic GERD     H/O finger fracture L hand Pinning to 2 and 3rd fingers.    Hypertension     OA (osteoarthritis)     Osteoporosis     Ulcerative colitis

## 2021-03-07 NOTE — CONSULT NOTE ADULT - ASSESSMENT
Ankita Philip is a 70 year old  female with a past medical history of HTN and colitis who presented to the ED after a witnessed episode of convulsion.    Impression: second apparently unprovoked seizure of unclear origin occurring during sleep. Etiology of seizure unclear.  Semiology: generalized convulsions occurring during sleep lasting for several minutes with associated tongue biting    Recs:  [] obtain vEEG for at least 24 hours  [] CPK, ammonia  [] will hold off on AEDs at this time  [] if patient has a convulsion, please document accurately the length of the episode, and specifically what the patient was doing paying attention to eye opening vs closure, gaze deviation, shaking of extremities, tongue bite, urinary incontinence, any derangement of vital signs  [] If patient has a generalized tonic clonic episode for >3 minutes or significant derangement of vital signs may administer Ativan 2mg IV  [] advise patient to not drive, operate heavy machinery, avoid heights, pools, bathtubs, locked doors.  [] will f/u outpatient upon discharge with epilepsy neurology at [address] [place] [address]. ([phone])    Case to be discussed and patient seen with neurology attending, Dr. Ray.      Ankita Philip is a 70 year old  female with a past medical history of HTN and colitis who presented to the ED after a witnessed episode of convulsion.    Impression: second apparently unprovoked seizure of unclear origin occurring during sleep. Etiology of seizure unclear.  Semiology: generalized convulsions occurring during sleep lasting for several minutes with associated tongue biting    Recs:  [] obtain vEEG for at least 24 hours  [] CPK, ammonia  [] will hold off on AEDs at this time  [] if patient has a convulsion, please document accurately the length of the episode, and specifically what the patient was doing paying attention to eye opening vs closure, gaze deviation, shaking of extremities, tongue bite, urinary incontinence, any derangement of vital signs  [] If patient has a generalized tonic clonic episode for >3 minutes or significant derangement of vital signs may administer Ativan 2mg IV  [] advise patient to not drive, operate heavy machinery, avoid heights, pools, bathtubs, locked doors  [] consider GYN involvement in the setting of possible pain and lumps in L breast  [] will f/u outpatient upon discharge with epilepsy neurology at [address] [place] [address]. ([phone])    Case to be discussed and patient seen with neurology attending, Dr. Ray.      Ankita Philip is a 70 year old  female with a past medical history of HTN and colitis who presented to the ED after a witnessed episode of convulsion.    Impression: second apparently unprovoked seizure of unclear origin occurring during sleep. Etiology of seizure unclear.  Semiology: generalized convulsions occurring during sleep lasting for several minutes with associated tongue biting    Recs:  [] obtain vEEG for at least 24 hours  [] CPK, ammonia  [] will hold off on AEDs at this time  [] if patient has a convulsion, please document accurately the length of the episode, and specifically what the patient was doing paying attention to eye opening vs closure, gaze deviation, shaking of extremities, tongue bite, urinary incontinence, any derangement of vital signs  [] If patient has a generalized tonic clonic episode for >3 minutes or significant derangement of vital signs may administer Ativan 2mg IV  [] advise patient to not drive, operate heavy machinery, avoid heights, pools, bathtubs, locked doors  [] consider GYN involvement in the setting of possible pain and lumps in L breast      [] will florian mri sz protoocl with contrast   [] given prior sz 12/20 high risk for recurrence would favor treating after eeg   [] no clear trigger possibly related to microvascular disease   [] will consider csf   {] discussed [facility] driving guidelines      [] will f/u outpatient upon discharge with epilepsy neurology at [address] [place] [address]. ([phone])

## 2021-03-07 NOTE — ED PROVIDER NOTE - OBJECTIVE STATEMENT
70F w/ PMhx of HTN p/w seizure-like activity today morning.  Pt. does not recall the incident.  Per , pt. was sleeping when she started convulsing with her eyes partially open.  Episode lasted for roughly 5 mins.  Pt. was confused after convulsions stopped, confusing her  for her son, but returned to baseline shortly thereafter.  Was admitted to [facility] in December, 2020 and had a negative w/u including CT/CTA head/neck, MRI, EEG.  Has not seen a neurologist since dc.  Endorses urinary frequency for the last few days, denies dysuria, f/c, CP, SOB, abd pain.  Pt.'s 32 y.o. niece passed away recently and she states this could be a possible trigger.  Endorses left leg swelling for the last month.

## 2021-03-07 NOTE — CONSULT NOTE ADULT - SUBJECTIVE AND OBJECTIVE BOX
HPI:  Ankita Philip is a 70 year old  female with a past medical history of HTN and colitis who presented to the ED after a witnessed episode of convulsion. At baseline, the patient ambulates without any assistive devices and participates in all her own ADLs. She was previously seen at this institution in 2020 for similar symptoms when she had generalized shaking while she was sleeping which was noticed by her  who attempted to wake her but was unable to for several minutes. On last admission, the patient underwent a routine EEG which did not show evidence of epileptiform activity and had an MRI which did not show any acute findings. She was unresponsive to verbal stimuli and could not be wakened by shaking her. As per the , she was noted to have shaking of her entire body. She then woke up and felt confused. Also reported was blood coming out of her mouth and she felt that she had bit her tongue. Denied urinary incontinence. Currently she states that she feels that she is back at her baseline level of health and alertness. Noted that she had recent stressors in her life of a close young family member passing away. Denied recent illnesses, sick contacts, falls, head trauma. Denied current headaches, dizziness, lightheadedness, nausea, vomiting, fevers, chills, numbness, tingling. Currently did endorse some pain in her R ankle and stated that her pains have been getting worse and she has some difficulty when she walks on the ankle. Noted that she had some generalized weakness. Additionally, she endorses some breast pain and stated that she believed she felt lumps in her L breast.    REVIEW OF SYSTEMS    A 10-system ROS was performed and is negative except for those items noted above and/or in the HPI.    PAST MEDICAL & SURGICAL HISTORY:  H/O finger fracture  L hand Pinning to 2 and 3rd fingers.    Hypertension    Colitis    S/P knee replacement  Left    H/O myomectomy    H/O finger fracture  L hand pinning to 2nfd nd 3rd finger.      FAMILY HISTORY:  FH: hypertension  Mother.      SOCIAL HISTORY:   Lives with:     MEDICATIONS (HOME):  Home Medications:  alendronate 70 mg oral tablet: 1 tab(s) orally once a week (27 Dec 2020 03:02)  Calcium 600+D oral tablet: 1 tab(s) orally once a day (27 Dec 2020 03:02)  gemfibrozil 600 mg oral tablet: 1 tab(s) orally 2 times a day (27 Dec 2020 03:02)  meloxicam 15 mg oral tablet: 1 tab(s) orally once a day (27 Dec 2020 03:02)  mesalamine 1.2 g oral delayed release tablet: 2 tab(s) orally once a day (27 Dec 2020 03:02)  Myrbetriq 25 mg oral tablet, extended release: 1 tab(s) orally once a day (27 Dec 2020 03:02)  Norvasc 5 mg oral tablet: 1 tab(s) orally once a day (27 Dec 2020 03:02)  omeprazole 40 mg oral delayed release capsule: 1 cap(s) orally once a day (27 Dec 2020 03:02)  Vitamin D3 1000 intl units (25 mcg) oral tablet: 1 tab(s) orally once a day (27 Dec 2020 03:)    MEDICATIONS  (STANDING):    MEDICATIONS  (PRN):    ALLERGIES/INTOLERANCES:  Allergies  No Known Allergies    Intolerances    VITALS & EXAMINATION:  Vital Signs Last 24 Hrs  T(C): 36.7 (07 Mar 2021 09:44), Max: 36.7 (07 Mar 2021 09:44)  T(F): 98 (07 Mar 2021 09:44), Max: 98 (07 Mar 2021 09:44)  HR: 84 (07 Mar 2021 13:14) (84 - 98)  BP: 133/88 (07 Mar 2021 13:14) (133/88 - 139/89)  BP(mean): --  RR: 18 (07 Mar 2021 13:14) (18 - 18)  SpO2: 100% (07 Mar 2021 13:14) (100% - 100%)    General:  Constitutional: Appears stated age, in no apparent distress including pain  Head: Normocephalic & atraumatic.  ENT: Noted lateral tongue lacerations    Neurological (>12):  MS: Awake, alert, oriented to person, place, situation, time. Normal affect. Follows all commands including crossed commands.     Language: Speech is clear, fluent with good repetition & comprehension.    CNs: PERRLA (R = 3mm, L = 3mm). VF intact in all 4 quadrants. EOMI no nystagmus, no diplopia. V1-3 intact to LT, well developed masseter muscles b/l. No facial asymmetry b/l, full eye closure strength b/l. Symmetric palate elevation in midline. Shoulder shrug intact b/l. Tongue midline, normal movements, no atrophy.    Motor: Normal muscle bulk & tone. No noticeable tremor or seizure. No pronator drift.              Deltoid	Biceps	Tricep	   R	5	5         5	5		  L	5	5	5	5    	H-Flex	H-Ext	K-Flex	K-Ext	D-Flex	P-Flex  R	5	5	5	5	5	5		   L	5	5	5	5	5	5		     Sensation: Intact to LT b/l throughout.     Reflexes:              Biceps(C5)       BR(C6)     Triceps(C7)               Patellar(L4)    Achilles(S1)    Plantar Resp  R	2	          2	             2		        2		    1		Down   L	2	          2	             2		        2		    1		Down     Coordination: No dysmetria to FTN    Gait: No postural instability. Cautious gait but with no ataxia.     LABORATORY:  CBC                       11.0   6.64  )-----------( 277      ( 07 Mar 2021 10:47 )             33.8     Chem 03-07    139  |  103  |  11  ----------------------------<  96  3.8   |  25  |  0.66    Ca    9.9      07 Mar 2021 10:47  Phos  2.7     03-07  Mg     2.3     03-07    TPro  7.1  /  Alb  4.2  /  TBili  <0.2  /  DBili  x   /  AST  26  /  ALT  24  /  AlkPhos  106  03-07    LFTs LIVER FUNCTIONS - ( 07 Mar 2021 10:47 )  Alb: 4.2 g/dL / Pro: 7.1 g/dL / ALK PHOS: 106 U/L / ALT: 24 U/L / AST: 26 U/L / GGT: x              U/A Urinalysis Basic - ( 07 Mar 2021 10:47 )    Color: Yellow / Appearance: Clear / S.019 / pH: x  Gluc: x / Ketone: Negative  / Bili: Negative / Urobili: <2 mg/dL   Blood: x / Protein: Trace / Nitrite: Negative   Leuk Esterase: Negative / RBC: x / WBC x   Sq Epi: x / Non Sq Epi: x / Bacteria: x        STUDIES & IMAGING:    Radiology (XR, CT, MR, U/S, TTE/LEONA):    < from: CT Head No Cont (21 @ 12:32) >  FINDINGS:  No acute transcortical infarct or intracranial hemorrhage identified.    There is hypoattenuation within the periventricular and subcortical white matter, which is nonspecific but likely related to sequelae of chronic microvascular changes.    No hydrocephalus or midline shift. No abnormal extra-axial fluid collections are present.    No displaced calvarial fracture. The visualized intraorbital compartments, paranasal sinuses and mastoid complexes appear unremarkable.    IMPRESSION:    -No acute intracranial findings.    -Chronic small vessel disease.    < end of copied text >

## 2021-03-07 NOTE — H&P ADULT - HISTORY OF PRESENT ILLNESS
69 yo female PMHx HTN, GERD, Osteoporosis, OA and UC p/w an episode of convulsions this morning. Obtained most of history from pt and verified the story with her , Jason. Pt last known normal was last night where pt had no complaints. This morning at 8am  witnessed pt in bed started to vigorously convulse, teeth clenched and her eyes partially open.  tried to position pt on her side. The episode lasted for about 5 mins, where after pt was confused, not recognizing her , breathing heavy and went to sleep where she unconscious for about 15 mins. When she woke up she was still a little confused, but more lucid than earlier. Pt c/o tongue pain and noticed little blood on her tip of the tongue. As per , pt had similar episode last December 2020 where she had negative CT head and MRI Head and was instructed to follow up with neurologist, but she didn't.  reports for past 3 months pt has been forgetful with urinary frequency. Pt admits to a mild lightheadedness since yesterday. She denies fever, chills, dysuria, chest pain, sob, palpitations, diaphoresis, nausea, vomiting or abdominal pain.

## 2021-03-07 NOTE — ED PROVIDER NOTE - CLINICAL SUMMARY MEDICAL DECISION MAKING FREE TEXT BOX
70F p/w seizure-like activity that lasted for 5 mins today morning.  Repeat incident, had a negative w/u in December.  Endorses urinary frequency.  Concern for undiagnosed seizure disorder.  Low concern for syncope given history.  Will get labs, U/A w/ culture, CT head, EKG, consult neuro, reassess, and dispo pending results.

## 2021-03-08 DIAGNOSIS — N63.0 UNSPECIFIED LUMP IN UNSPECIFIED BREAST: ICD-10-CM

## 2021-03-08 LAB
ALBUMIN SERPL ELPH-MCNC: 3.8 G/DL — SIGNIFICANT CHANGE UP (ref 3.3–5)
ALP SERPL-CCNC: 102 U/L — SIGNIFICANT CHANGE UP (ref 40–120)
ALT FLD-CCNC: 25 U/L — SIGNIFICANT CHANGE UP (ref 4–33)
ANION GAP SERPL CALC-SCNC: 10 MMOL/L — SIGNIFICANT CHANGE UP (ref 7–14)
AST SERPL-CCNC: 26 U/L — SIGNIFICANT CHANGE UP (ref 4–32)
BILIRUB SERPL-MCNC: 0.2 MG/DL — SIGNIFICANT CHANGE UP (ref 0.2–1.2)
BUN SERPL-MCNC: 13 MG/DL — SIGNIFICANT CHANGE UP (ref 7–23)
CALCIUM SERPL-MCNC: 9.4 MG/DL — SIGNIFICANT CHANGE UP (ref 8.4–10.5)
CHLORIDE SERPL-SCNC: 105 MMOL/L — SIGNIFICANT CHANGE UP (ref 98–107)
CK SERPL-CCNC: 446 U/L — HIGH (ref 25–170)
CO2 SERPL-SCNC: 23 MMOL/L — SIGNIFICANT CHANGE UP (ref 22–31)
CREAT SERPL-MCNC: 0.72 MG/DL — SIGNIFICANT CHANGE UP (ref 0.5–1.3)
CULTURE RESULTS: SIGNIFICANT CHANGE UP
FOLATE SERPL-MCNC: 17.8 NG/ML — HIGH (ref 3.1–17.5)
GLUCOSE SERPL-MCNC: 90 MG/DL — SIGNIFICANT CHANGE UP (ref 70–99)
HCT VFR BLD CALC: 31.7 % — LOW (ref 34.5–45)
HGB BLD-MCNC: 10.2 G/DL — LOW (ref 11.5–15.5)
IRON SATN MFR SERPL: 17 % — SIGNIFICANT CHANGE UP (ref 14–50)
IRON SATN MFR SERPL: 43 UG/DL — SIGNIFICANT CHANGE UP (ref 30–160)
MAGNESIUM SERPL-MCNC: 2.2 MG/DL — SIGNIFICANT CHANGE UP (ref 1.6–2.6)
MCHC RBC-ENTMCNC: 27.9 PG — SIGNIFICANT CHANGE UP (ref 27–34)
MCHC RBC-ENTMCNC: 32.2 GM/DL — SIGNIFICANT CHANGE UP (ref 32–36)
MCV RBC AUTO: 86.8 FL — SIGNIFICANT CHANGE UP (ref 80–100)
NRBC # BLD: 0 /100 WBCS — SIGNIFICANT CHANGE UP
NRBC # FLD: 0 K/UL — SIGNIFICANT CHANGE UP
PHOSPHATE SERPL-MCNC: 3.1 MG/DL — SIGNIFICANT CHANGE UP (ref 2.5–4.5)
PLATELET # BLD AUTO: 264 K/UL — SIGNIFICANT CHANGE UP (ref 150–400)
POTASSIUM SERPL-MCNC: 3.6 MMOL/L — SIGNIFICANT CHANGE UP (ref 3.5–5.3)
POTASSIUM SERPL-SCNC: 3.6 MMOL/L — SIGNIFICANT CHANGE UP (ref 3.5–5.3)
PROT SERPL-MCNC: 6.1 G/DL — SIGNIFICANT CHANGE UP (ref 6–8.3)
RBC # BLD: 3.65 M/UL — LOW (ref 3.8–5.2)
RBC # FLD: 15 % — HIGH (ref 10.3–14.5)
SARS-COV-2 IGG SERPL QL IA: NEGATIVE — SIGNIFICANT CHANGE UP
SARS-COV-2 IGM SERPL IA-ACNC: 0.07 INDEX — SIGNIFICANT CHANGE UP
SODIUM SERPL-SCNC: 138 MMOL/L — SIGNIFICANT CHANGE UP (ref 135–145)
SPECIMEN SOURCE: SIGNIFICANT CHANGE UP
TIBC SERPL-MCNC: 252 UG/DL — SIGNIFICANT CHANGE UP (ref 220–430)
UIBC SERPL-MCNC: 209 UG/DL — SIGNIFICANT CHANGE UP (ref 110–370)
VIT B12 SERPL-MCNC: 734 PG/ML — SIGNIFICANT CHANGE UP (ref 200–900)
WBC # BLD: 5.33 K/UL — SIGNIFICANT CHANGE UP (ref 3.8–10.5)
WBC # FLD AUTO: 5.33 K/UL — SIGNIFICANT CHANGE UP (ref 3.8–10.5)

## 2021-03-08 PROCEDURE — 99233 SBSQ HOSP IP/OBS HIGH 50: CPT

## 2021-03-08 RX ORDER — INFLUENZA VIRUS VACCINE 15; 15; 15; 15 UG/.5ML; UG/.5ML; UG/.5ML; UG/.5ML
0.7 SUSPENSION INTRAMUSCULAR ONCE
Refills: 0 | Status: DISCONTINUED | OUTPATIENT
Start: 2021-03-08 | End: 2021-03-11

## 2021-03-08 RX ORDER — INFLUENZA VIRUS VACCINE 15; 15; 15; 15 UG/.5ML; UG/.5ML; UG/.5ML; UG/.5ML
0.5 SUSPENSION INTRAMUSCULAR ONCE
Refills: 0 | Status: DISCONTINUED | OUTPATIENT
Start: 2021-03-08 | End: 2021-03-08

## 2021-03-08 RX ADMIN — Medication 800 MILLIGRAM(S): at 05:46

## 2021-03-08 RX ADMIN — Medication 600 MILLIGRAM(S): at 05:46

## 2021-03-08 RX ADMIN — Medication 1 TABLET(S): at 12:38

## 2021-03-08 RX ADMIN — Medication 600 MILLIGRAM(S): at 17:46

## 2021-03-08 RX ADMIN — Medication 800 MILLIGRAM(S): at 21:39

## 2021-03-08 RX ADMIN — Medication 1000 UNIT(S): at 12:38

## 2021-03-08 RX ADMIN — ENOXAPARIN SODIUM 40 MILLIGRAM(S): 100 INJECTION SUBCUTANEOUS at 21:39

## 2021-03-08 RX ADMIN — PANTOPRAZOLE SODIUM 40 MILLIGRAM(S): 20 TABLET, DELAYED RELEASE ORAL at 05:46

## 2021-03-08 RX ADMIN — AMLODIPINE BESYLATE 5 MILLIGRAM(S): 2.5 TABLET ORAL at 05:46

## 2021-03-08 RX ADMIN — Medication 800 MILLIGRAM(S): at 12:39

## 2021-03-08 NOTE — PROGRESS NOTE ADULT - PROBLEM SELECTOR PLAN 1
Seizure precautions  -House neuro consulted  -Ativan IV 2mg if convulsion re-occur for >3 mins   -24hr Video EEG  -obtain ammonia, CK Seizure precautions  -House neuro consulted  -Ativan IV 2mg if convulsion re-occur for >3 mins   -24hr Video EEG  -ammonia wnl , CK slightly elevated

## 2021-03-08 NOTE — PROGRESS NOTE ADULT - SUBJECTIVE AND OBJECTIVE BOX
Patient is a 70y old  Female who presents with a chief complaint of seizure, AMS (07 Mar 2021 16:46)      SUBJECTIVE / OVERNIGHT EVENTS:    MEDICATIONS  (STANDING):  amLODIPine   Tablet 5 milliGRAM(s) Oral daily  calcium carbonate 1250 mG  + Vitamin D (OsCal 500 + D) 1 Tablet(s) Oral daily  cholecalciferol 1000 Unit(s) Oral daily  enoxaparin Injectable 40 milliGRAM(s) SubCutaneous every 24 hours  gemfibrozil 600 milliGRAM(s) Oral two times a day  influenza  Vaccine (HIGH DOSE) 0.7 milliLiter(s) IntraMuscular once  mesalamine DR Capsule 800 milliGRAM(s) Oral every 8 hours  pantoprazole    Tablet 40 milliGRAM(s) Oral before breakfast    MEDICATIONS  (PRN):  acetaminophen   Tablet .. 650 milliGRAM(s) Oral every 6 hours PRN Mild Pain (1 - 3), Moderate Pain (4 - 6)  LORazepam   Injectable 2 milliGRAM(s) IV Push once PRN Seizure activity      Vital Signs Last 24 Hrs  T(C): 36.8 (08 Mar 2021 05:44), Max: 37.3 (07 Mar 2021 21:54)  T(F): 98.3 (08 Mar 2021 05:44), Max: 99.2 (07 Mar 2021 21:54)  HR: 86 (08 Mar 2021 05:44) (71 - 86)  BP: 130/69 (08 Mar 2021 05:44) (125/76 - 156/86)  BP(mean): --  RR: 19 (08 Mar 2021 05:44) (17 - 19)  SpO2: 100% (08 Mar 2021 05:44) (96% - 100%)  CAPILLARY BLOOD GLUCOSE      POCT Blood Glucose.: 79 mg/dL (07 Mar 2021 10:39)    I&O's Summary      PHYSICAL EXAM:  GENERAL: NAD, well-developed  HEAD:  Atraumatic, Normocephalic  EYES: EOMI, PERRLA, conjunctiva and sclera clear  NECK: Supple, No JVD  CHEST/LUNG: Clear to auscultation bilaterally; No wheeze  HEART: Regular rate and rhythm; No murmurs, rubs, or gallops  ABDOMEN: Soft, Nontender, Nondistended; Bowel sounds present  EXTREMITIES:  2+ Peripheral Pulses, No clubbing, cyanosis, or edema  PSYCH: AAOx3  NEUROLOGY: non-focal  SKIN: No rashes or lesions    LABS:                        10.2   5.33  )-----------( 264      ( 08 Mar 2021 06:46 )             31.7     03-08    138  |  105  |  13  ----------------------------<  90  3.6   |  23  |  0.72    Ca    9.4      08 Mar 2021 06:46  Phos  3.1     03-08  Mg     2.2     03-08    TPro  6.1  /  Alb  3.8  /  TBili  0.2  /  DBili  x   /  AST  26  /  ALT  25  /  AlkPhos  102  03-08      CARDIAC MARKERS ( 08 Mar 2021 06:46 )  x     / x     / 446 U/L / x     / x      CARDIAC MARKERS ( 07 Mar 2021 18:56 )  x     / x     / 468 U/L / x     / x          Urinalysis Basic - ( 07 Mar 2021 10:47 )    Color: Yellow / Appearance: Clear / S.019 / pH: x  Gluc: x / Ketone: Negative  / Bili: Negative / Urobili: <2 mg/dL   Blood: x / Protein: Trace / Nitrite: Negative   Leuk Esterase: Negative / RBC: x / WBC x   Sq Epi: x / Non Sq Epi: x / Bacteria: x        RADIOLOGY & ADDITIONAL TESTS:    Imaging Personally Reviewed:    Consultant(s) Notes Reviewed:      Care Discussed with Consultants/Other Providers:   Patient is a 70y old  Female who presents with a chief complaint of seizure, AMS (07 Mar 2021 16:46)      SUBJECTIVE / OVERNIGHT EVENTS: patient seen and examined by bedside, no further Seizure activity ,  pt denies headache, dizziness, SOB, CP, Palpitations , N/V/D, abdominal pain        MEDICATIONS  (STANDING):  amLODIPine   Tablet 5 milliGRAM(s) Oral daily  calcium carbonate 1250 mG  + Vitamin D (OsCal 500 + D) 1 Tablet(s) Oral daily  cholecalciferol 1000 Unit(s) Oral daily  enoxaparin Injectable 40 milliGRAM(s) SubCutaneous every 24 hours  gemfibrozil 600 milliGRAM(s) Oral two times a day  influenza  Vaccine (HIGH DOSE) 0.7 milliLiter(s) IntraMuscular once  mesalamine DR Capsule 800 milliGRAM(s) Oral every 8 hours  pantoprazole    Tablet 40 milliGRAM(s) Oral before breakfast    MEDICATIONS  (PRN):  acetaminophen   Tablet .. 650 milliGRAM(s) Oral every 6 hours PRN Mild Pain (1 - 3), Moderate Pain (4 - 6)  LORazepam   Injectable 2 milliGRAM(s) IV Push once PRN Seizure activity      Vital Signs Last 24 Hrs  T(C): 36.8 (08 Mar 2021 05:44), Max: 37.3 (07 Mar 2021 21:54)  T(F): 98.3 (08 Mar 2021 05:44), Max: 99.2 (07 Mar 2021 21:54)  HR: 86 (08 Mar 2021 05:44) (71 - 86)  BP: 130/69 (08 Mar 2021 05:44) (125/76 - 156/86)  BP(mean): --  RR: 19 (08 Mar 2021 05:44) (17 - 19)  SpO2: 100% (08 Mar 2021 05:44) (96% - 100%)  CAPILLARY BLOOD GLUCOSE      POCT Blood Glucose.: 79 mg/dL (07 Mar 2021 10:39)    I&O's Summary      PHYSICAL EXAM:  GENERAL: NAD, well-developed  HEAD:  Atraumatic, Normocephalic, marks of tongue bite b/l on the tongue   EYES: EOMI, PERRLA, conjunctiva and sclera clear  CHEST/LUNG: Clear to auscultation bilaterally; No wheeze  HEART: Regular rate and rhythm;  ABDOMEN: Soft, Nontender, Nondistended; Bowel sounds present  EXTREMITIES:  2+ Peripheral Pulses, No clubbing, cyanosis, or edema  PSYCH: AAOx3  NEUROLOGY: non-focal  SKIN: No rashes or lesions    LABS:                        10.2   5.33  )-----------( 264      ( 08 Mar 2021 06:46 )             31.7     03-08    138  |  105  |  13  ----------------------------<  90  3.6   |  23  |  0.72    Ca    9.4      08 Mar 2021 06:46  Phos  3.1     03-08  Mg     2.2     03-08    TPro  6.1  /  Alb  3.8  /  TBili  0.2  /  DBili  x   /  AST  26  /  ALT  25  /  AlkPhos  102  03-08      CARDIAC MARKERS ( 08 Mar 2021 06:46 )  x     / x     / 446 U/L / x     / x      CARDIAC MARKERS ( 07 Mar 2021 18:56 )  x     / x     / 468 U/L / x     / x          Urinalysis Basic - ( 07 Mar 2021 10:47 )    Color: Yellow / Appearance: Clear / S.019 / pH: x  Gluc: x / Ketone: Negative  / Bili: Negative / Urobili: <2 mg/dL   Blood: x / Protein: Trace / Nitrite: Negative   Leuk Esterase: Negative / RBC: x / WBC x   Sq Epi: x / Non Sq Epi: x / Bacteria: x        RADIOLOGY & ADDITIONAL TESTS:    Imaging Personally Reviewed:    Consultant(s) Notes Reviewed:  Neurology     Care Discussed with Consultants/Other Providers:

## 2021-03-08 NOTE — PHYSICAL THERAPY INITIAL EVALUATION ADULT - DISCHARGE DISPOSITION, PT EVAL
Anticipate Home with outpatient PT services upon discharge.  Will follow on unit./home w/ outpatient services

## 2021-03-08 NOTE — PROGRESS NOTE ADULT - ASSESSMENT
69 yo female PMHx HTN, GERD, Osteoporosis, OA and UC p/w an episode of convulsions followed by confusion this morning, admitted to medicine for Seizure.    VTE Risk Assess calculator  NOT WORKING: showing error. IT called.   69 yo female PMHx HTN, GERD, Osteoporosis, OA and UC p/w an episode of convulsions followed by confusion this morning, admitted to medicine for Seizure.

## 2021-03-08 NOTE — PHYSICAL THERAPY INITIAL EVALUATION ADULT - ASR EQUIP NEEDS DISCH PT EVAL
Anticipate necessary equipment need for a straight cane upon discharge patient given straight cane, educated on proper use

## 2021-03-08 NOTE — PHYSICAL THERAPY INITIAL EVALUATION ADULT - PERTINENT HX OF CURRENT PROBLEM, REHAB EVAL
Pt. is a 70 year old female, admitted with Convulsions.  PMH: anemia, ulcerative colitis, osteoporosis, OA, chronic GERD, HTN

## 2021-03-08 NOTE — PROGRESS NOTE ADULT - SUBJECTIVE AND OBJECTIVE BOX
[facility]      Seen earlier today, and examined.  - Today, patient is without complaints.           *****MEDICATIONS: Current medication reviewed and documented.    MEDICATIONS  (STANDING):  amLODIPine   Tablet 5 milliGRAM(s) Oral daily  calcium carbonate 1250 mG  + Vitamin D (OsCal 500 + D) 1 Tablet(s) Oral daily  cholecalciferol 1000 Unit(s) Oral daily  enoxaparin Injectable 40 milliGRAM(s) SubCutaneous every 24 hours  gemfibrozil 600 milliGRAM(s) Oral two times a day  influenza  Vaccine (HIGH DOSE) 0.7 milliLiter(s) IntraMuscular once  mesalamine DR Capsule 800 milliGRAM(s) Oral every 8 hours  pantoprazole    Tablet 40 milliGRAM(s) Oral before breakfast    MEDICATIONS  (PRN):  acetaminophen   Tablet .. 650 milliGRAM(s) Oral every 6 hours PRN Mild Pain (1 - 3), Moderate Pain (4 - 6)  LORazepam   Injectable 2 milliGRAM(s) IV Push once PRN Seizure activity          ***** VITAL SIGNS:  T(F): 98.1 (21 @ 12:41), Max: 99.2 (21 @ 21:54)  HR: 76 (21 @ 12:41) (74 - 86)  BP: 129/72 (21 @ 12:41) (125/76 - 133/69)  RR: 18 (21 @ 12:41) (17 - 19)  SpO2: 100% (21 @ 12:41) (96% - 100%)  Wt(kg): --  ,   I&O's Summary           *****PHYSICAL EXAM:   alert oriented x 3 attention comprehension are fair.  Able to name, repeat.   EOmi fundi not visualized   no nystagmus VFF to confrontation  Tongue is midline  Palate elevates symmetrically   Moving all 4 ext spontaneously no drift appreciated    Gait not assessed.            *****LAB AND IMAGING:                        10.2   5.33  )-----------( 264      ( 08 Mar 2021 06:46 )             31.7               -08    138  |  105  |  13  ----------------------------<  90  3.6   |  23  |  0.72    Ca    9.4      08 Mar 2021 06:46  Phos  3.1     03-08  Mg     2.2     03-08    TPro  6.1  /  Alb  3.8  /  TBili  0.2  /  DBili  x   /  AST  26  /  ALT  25  /  AlkPhos  102  03-08           CARDIAC MARKERS ( 08 Mar 2021 06:46 )  x     / x     / 446 U/L / x     / x      CARDIAC MARKERS ( 07 Mar 2021 18:56 )  x     / x     / 468 U/L / x     / x                  Urinalysis Basic - ( 07 Mar 2021 10:47 )    Color: Yellow / Appearance: Clear / S.019 / pH: x  Gluc: x / Ketone: Negative  / Bili: Negative / Urobili: <2 mg/dL   Blood: x / Protein: Trace / Nitrite: Negative   Leuk Esterase: Negative / RBC: x / WBC x   Sq Epi: x / Non Sq Epi: x / Bacteria: x      [All pertinent recent Imaging/Reports reviewed]           *****A S S E S S M E N T   A N D   P L A N :    Ankita Philip is a 70 year old LH female with a past medical history of HTN and colitis who presented to the ED after a witnessed episode of convulsion.    Impression: second apparently unprovoked seizure of unclear origin occurring during sleep. Etiology of seizure unclear.  Semiology: generalized convulsions occurring during sleep lasting for several minutes with associated tongue biting    Recs:  []   vEEG in place   [] CPK, ammonia   [] will florian mri sz protoocl with contrast   [] given prior sz  high risk for recurrence would favor treating after eeg   [] no clear trigger possibly related to microvascular disease   [] will consider csf testing   {] discussed nys driving guidelines  [] antiphospholipid antibodies     [] will f/u outpatient upon discharge with epilepsy neurology at [address] [place] [address]. ([phone])      Thank you for allowing me to participate in the care of this patient. Will continue to follow patient periodically. Please do not hesitate to call me if you have any  questions or if there has been a change in patients neurological status     ________________  Yareli Colunag MD  [facility] ([facility]  [phone]      33 minutes spent on total encounter; more than 50 % of the visit was  spent counseling about plan of care, compliance to diet/exercise and medication regimen and or  coordinating care by the attending physician.      It is advised that stroke patients follow up with BOOKER Gomes @ [phone] in 1- 2 weeks.   Others please follow up with Dr. Michael Nissenbaum [phone]

## 2021-03-08 NOTE — PHYSICAL THERAPY INITIAL EVALUATION ADULT - DIAGNOSIS, PT EVAL
Deconditioning, decrease strength, decrease endurance, and decrease balance limiting functional mobility.

## 2021-03-08 NOTE — PHYSICAL THERAPY INITIAL EVALUATION ADULT - ADDITIONAL COMMENTS
Pt. lives in an apartment with her .  Pt. was independent in ADLs and required no ambulatory devices prior to hospitalization.      Pt. left supine in bed in NAD, lines intact, and call bell in reach.  RN made aware

## 2021-03-08 NOTE — PHYSICAL THERAPY INITIAL EVALUATION ADULT - MANUAL MUSCLE TESTING RESULTS, REHAB EVAL
Pt. demonstrates grossly 3/5 throughout bilateral UE to available ROM, grossly 3/5 throughout bilateral LE to available ROM.

## 2021-03-08 NOTE — PHYSICAL THERAPY INITIAL EVALUATION ADULT - GAIT DISTANCE, PT EVAL
100 feet total distance, 50 feet with rolling walker with contact guard, 50 feet with no assistive device with contact guard, pt. assessed for appropriate assistive device, pt. will benefit in gait training with straight cane 125 feet total distance, 50 feet with rolling walker with contact guard, 50 feet with no assistive device with contact guard, and 50ft straight cane close supervision. Patient demonstrated good balance with straight cane

## 2021-03-09 LAB
ALBUMIN SERPL ELPH-MCNC: 3.7 G/DL — SIGNIFICANT CHANGE UP (ref 3.3–5)
ALP SERPL-CCNC: 103 U/L — SIGNIFICANT CHANGE UP (ref 40–120)
ALT FLD-CCNC: 26 U/L — SIGNIFICANT CHANGE UP (ref 4–33)
ANION GAP SERPL CALC-SCNC: 10 MMOL/L — SIGNIFICANT CHANGE UP (ref 7–14)
AST SERPL-CCNC: 24 U/L — SIGNIFICANT CHANGE UP (ref 4–32)
BILIRUB SERPL-MCNC: <0.2 MG/DL — SIGNIFICANT CHANGE UP (ref 0.2–1.2)
BUN SERPL-MCNC: 18 MG/DL — SIGNIFICANT CHANGE UP (ref 7–23)
CALCIUM SERPL-MCNC: 9.6 MG/DL — SIGNIFICANT CHANGE UP (ref 8.4–10.5)
CHLORIDE SERPL-SCNC: 105 MMOL/L — SIGNIFICANT CHANGE UP (ref 98–107)
CK SERPL-CCNC: 403 U/L — HIGH (ref 25–170)
CO2 SERPL-SCNC: 24 MMOL/L — SIGNIFICANT CHANGE UP (ref 22–31)
CREAT SERPL-MCNC: 0.78 MG/DL — SIGNIFICANT CHANGE UP (ref 0.5–1.3)
GLUCOSE SERPL-MCNC: 89 MG/DL — SIGNIFICANT CHANGE UP (ref 70–99)
HCT VFR BLD CALC: 34.3 % — LOW (ref 34.5–45)
HGB BLD-MCNC: 10.9 G/DL — LOW (ref 11.5–15.5)
MAGNESIUM SERPL-MCNC: 2 MG/DL — SIGNIFICANT CHANGE UP (ref 1.6–2.6)
MCHC RBC-ENTMCNC: 27.9 PG — SIGNIFICANT CHANGE UP (ref 27–34)
MCHC RBC-ENTMCNC: 31.8 GM/DL — LOW (ref 32–36)
MCV RBC AUTO: 87.9 FL — SIGNIFICANT CHANGE UP (ref 80–100)
NRBC # BLD: 0 /100 WBCS — SIGNIFICANT CHANGE UP
NRBC # FLD: 0 K/UL — SIGNIFICANT CHANGE UP
PHOSPHATE SERPL-MCNC: 3.3 MG/DL — SIGNIFICANT CHANGE UP (ref 2.5–4.5)
PLATELET # BLD AUTO: 282 K/UL — SIGNIFICANT CHANGE UP (ref 150–400)
POTASSIUM SERPL-MCNC: 3.8 MMOL/L — SIGNIFICANT CHANGE UP (ref 3.5–5.3)
POTASSIUM SERPL-SCNC: 3.8 MMOL/L — SIGNIFICANT CHANGE UP (ref 3.5–5.3)
PROT SERPL-MCNC: 6.5 G/DL — SIGNIFICANT CHANGE UP (ref 6–8.3)
RBC # BLD: 3.9 M/UL — SIGNIFICANT CHANGE UP (ref 3.8–5.2)
RBC # FLD: 14.9 % — HIGH (ref 10.3–14.5)
SODIUM SERPL-SCNC: 139 MMOL/L — SIGNIFICANT CHANGE UP (ref 135–145)
WBC # BLD: 5.92 K/UL — SIGNIFICANT CHANGE UP (ref 3.8–10.5)
WBC # FLD AUTO: 5.92 K/UL — SIGNIFICANT CHANGE UP (ref 3.8–10.5)

## 2021-03-09 PROCEDURE — 95720 EEG PHY/QHP EA INCR W/VEEG: CPT

## 2021-03-09 PROCEDURE — 99233 SBSQ HOSP IP/OBS HIGH 50: CPT

## 2021-03-09 PROCEDURE — 70553 MRI BRAIN STEM W/O & W/DYE: CPT | Mod: 26

## 2021-03-09 RX ORDER — POLYETHYLENE GLYCOL 3350 17 G/17G
17 POWDER, FOR SOLUTION ORAL DAILY
Refills: 0 | Status: DISCONTINUED | OUTPATIENT
Start: 2021-03-09 | End: 2021-03-11

## 2021-03-09 RX ADMIN — AMLODIPINE BESYLATE 5 MILLIGRAM(S): 2.5 TABLET ORAL at 06:36

## 2021-03-09 RX ADMIN — Medication 600 MILLIGRAM(S): at 17:43

## 2021-03-09 RX ADMIN — Medication 1 TABLET(S): at 11:13

## 2021-03-09 RX ADMIN — ENOXAPARIN SODIUM 40 MILLIGRAM(S): 100 INJECTION SUBCUTANEOUS at 21:54

## 2021-03-09 RX ADMIN — Medication 800 MILLIGRAM(S): at 06:30

## 2021-03-09 RX ADMIN — POLYETHYLENE GLYCOL 3350 17 GRAM(S): 17 POWDER, FOR SOLUTION ORAL at 22:32

## 2021-03-09 RX ADMIN — Medication 800 MILLIGRAM(S): at 22:32

## 2021-03-09 RX ADMIN — Medication 600 MILLIGRAM(S): at 06:30

## 2021-03-09 RX ADMIN — PANTOPRAZOLE SODIUM 40 MILLIGRAM(S): 20 TABLET, DELAYED RELEASE ORAL at 06:30

## 2021-03-09 RX ADMIN — Medication 1000 UNIT(S): at 11:13

## 2021-03-09 RX ADMIN — Medication 800 MILLIGRAM(S): at 11:15

## 2021-03-09 NOTE — PROGRESS NOTE ADULT - PROBLEM SELECTOR PLAN 1
Seizure precautions  -House neuro consulted  -Ativan IV 2mg if convulsion re-occur for >3 mins   -24hr Video EEG  -ammonia wnl , CK slightly elevated Seizure precautions  -House neuro consulted  -Ativan IV 2mg if convulsion re-occur for >3 mins   -24hr Video EEG, Normal EEG study. No epileptiform pattern or seizure seen.  -ammonia wnl , CK slightly elevated, will repeat   -Neurology f/u appreciated, case discussed , recommend MRI with con, Sz protocol , will check antiphospholipid Ab   will consider LP and CSF studies if the w/u is unremarkable

## 2021-03-09 NOTE — EEG REPORT - NS EEG TEXT BOX
[facility]   COMPREHENSIVE EPILEPSY CENTER   REPORT OF LONG-TERM VIDEO EEG     [facility]: [address], [place] [address], Ph#: [phone]  [facility]: [address] [place] [address], Ph#: [phone]  [facility]: [address] [place] [address], Ph#: [phone]    Patient Name: CHRISSY MEJIAS  Age and : 70y (48)  MRN #: [id number]  Location: [place] [address] [address]  Referring Physician: Avery Wilkinson    Start Time/Date: 16:39 on 2021  End Time/Date: 08:00 on 2021  Duration: 15:17    _____________________________________________________________  STUDY INFORMATION    EEG Recording Technique:  The patient underwent continuous Video-EEG monitoring, using Telemetry System hardware on the XLTek Digital System. EEG and video data were stored on a computer hard drive with important events saved in digital archive files. The material was reviewed by a physician (electroencephalographer / epileptologist) on a daily basis. Robert and seizure detection algorithms were utilized and reviewed. An EEG Technician attended to the patient, and was available throughout daytime work hours.  The epilepsy center neurologist was available in person or on call 24-hours per day.    EEG Placement and Labeling of Electrodes:  The EEG was performed utilizing 20 channel referential EEG connections (coronal over temporal over parasagittal montage) using all standard 10-20 electrode placements with EKG, with additional electrodes placed in the inferior temporal region using the modified 10-10 montage electrode placements for elective admissions, or if deemed necessary. Recording was at a sampling rate of 256 samples per second per channel. Time synchronized digital video recording was done simultaneously with EEG recording. A low light infrared camera was used for low light recording.     _____________________________________________________________  HISTORY    Patient is a 70y old  Female who presents with a chief complaint of seizure, AMS (08 Mar 2021 13:52)      PERTINENT MEDICATION:  none    _____________________________________________________________  STUDY INTERPRETATION    Findings: The background was continuous, spontaneously variable and reactive. During wakefulness, the posterior dominant rhythm consisted of symmetric, well-modulated 9 Hz activity, with amplitude to 30 uV, that attenuated to eye opening.  Low amplitude frontal beta was noted in wakefulness.    Background Slowing:  No generalized background slowing was present.    Focal Slowing:   None were present.    Sleep Background:  Drowsiness was characterized by fragmentation, attenuation, and slowing of the background activity.    Sleep was characterized by the presence of vertex waves, symmetric sleep spindles and K-complexes..    Other Non-Epileptiform Findings:  None were present.    Interictal Epileptiform Activity:   None were present.    Events:  Clinical events: None recorded.  Seizures: None recorded.    Activation Procedures:   Hyperventilation was not performed.    Photic stimulation was not performed.     Artifacts:  Intermittent myogenic and movement artifacts were noted.    ECG:  The heart rate on single channel ECG was predominantly between 70-80 BPM.    _____________________________________________________________  EEG SUMMARY/CLASSIFICATION    Normal EEG in the awake, drowsy and asleep states.    _____________________________________________________________  EEG IMPRESSION/CLINICAL CORRELATE    Normal EEG study.  No epileptiform pattern or seizure seen.    Yaya Jimenez MD PGY-5  Epilepsy Fellow    This Preliminary report is based on fellow review. Final report pending attending review.    Reading Room: [phone]  On Call Service After Hours: [phone] [facility]   COMPREHENSIVE EPILEPSY CENTER   REPORT OF LONG-TERM VIDEO EEG     [facility]: [address], [place] [address], Ph#: [phone]  [facility]: [address] [place] [address], Ph#: [phone]  [facility]: [address] [place] [address], Ph#: [phone]    Patient Name: CHRISSY MEJIAS  Age and : 70y (48)  MRN #: [id number]  Location: [place] [address] [address]  Referring Physician: Avery Wilkinson    Start Time/Date: 16:39 on 2021  End Time/Date: 08:00 on 2021  Duration: 15:17    _____________________________________________________________  STUDY INFORMATION    EEG Recording Technique:  The patient underwent continuous Video-EEG monitoring, using Telemetry System hardware on the XLTek Digital System. EEG and video data were stored on a computer hard drive with important events saved in digital archive files. The material was reviewed by a physician (electroencephalographer / epileptologist) on a daily basis. Robert and seizure detection algorithms were utilized and reviewed. An EEG Technician attended to the patient, and was available throughout daytime work hours.  The epilepsy center neurologist was available in person or on call 24-hours per day.    EEG Placement and Labeling of Electrodes:  The EEG was performed utilizing 20 channel referential EEG connections (coronal over temporal over parasagittal montage) using all standard 10-20 electrode placements with EKG, with additional electrodes placed in the inferior temporal region using the modified 10-10 montage electrode placements for elective admissions, or if deemed necessary. Recording was at a sampling rate of 256 samples per second per channel. Time synchronized digital video recording was done simultaneously with EEG recording. A low light infrared camera was used for low light recording.     _____________________________________________________________  HISTORY    Patient is a 70y old  Female who presents with a chief complaint of seizure, AMS (08 Mar 2021 13:52)      PERTINENT MEDICATION:  none    _____________________________________________________________  STUDY INTERPRETATION    Findings: The background was continuous, spontaneously variable and reactive. During wakefulness, the posterior dominant rhythm consisted of symmetric, well-modulated 9 Hz activity, with amplitude to 30 uV, that attenuated to eye opening.  Low amplitude frontal beta was noted in wakefulness.    Background Slowing:  No generalized background slowing was present.    Focal Slowing:   None were present.    Sleep Background:  Drowsiness was characterized by fragmentation, attenuation, and slowing of the background activity.    Sleep was characterized by the presence of vertex waves, symmetric sleep spindles and K-complexes..    Other Non-Epileptiform Findings:  None were present.    Interictal Epileptiform Activity:   None were present.    Events:  Clinical events: None recorded.  Seizures: None recorded.    Activation Procedures:   Hyperventilation was not performed.    Photic stimulation was not performed.     Artifacts:  Intermittent myogenic and movement artifacts were noted.    ECG:  The heart rate on single channel ECG was predominantly between 70-80 BPM.    _____________________________________________________________  EEG SUMMARY/CLASSIFICATION    Normal EEG in the awake, drowsy and asleep states.    _____________________________________________________________  EEG IMPRESSION/CLINICAL CORRELATE    Normal EEG study.  No epileptiform pattern or seizure seen.    Yaya Jimenez MD PGY-5  Epilepsy Fellow    Solomon Galvan MD  EEG/Epilepsy Attending    Reading Room: [phone]  On Call Service After Hours: [phone]

## 2021-03-09 NOTE — PROGRESS NOTE ADULT - SUBJECTIVE AND OBJECTIVE BOX
Patient is a 70y old  Female who presents with a chief complaint of seizure, AMS (08 Mar 2021 13:52)      SUBJECTIVE / OVERNIGHT EVENTS:    MEDICATIONS  (STANDING):  amLODIPine   Tablet 5 milliGRAM(s) Oral daily  calcium carbonate 1250 mG  + Vitamin D (OsCal 500 + D) 1 Tablet(s) Oral daily  cholecalciferol 1000 Unit(s) Oral daily  enoxaparin Injectable 40 milliGRAM(s) SubCutaneous every 24 hours  gemfibrozil 600 milliGRAM(s) Oral two times a day  influenza  Vaccine (HIGH DOSE) 0.7 milliLiter(s) IntraMuscular once  mesalamine DR Capsule 800 milliGRAM(s) Oral every 8 hours  pantoprazole    Tablet 40 milliGRAM(s) Oral before breakfast    MEDICATIONS  (PRN):  acetaminophen   Tablet .. 650 milliGRAM(s) Oral every 6 hours PRN Mild Pain (1 - 3), Moderate Pain (4 - 6)  LORazepam   Injectable 2 milliGRAM(s) IV Push once PRN Seizure activity      Vital Signs Last 24 Hrs  T(C): 36.6 (09 Mar 2021 06:28), Max: 36.7 (08 Mar 2021 12:41)  T(F): 97.8 (09 Mar 2021 06:28), Max: 98.1 (08 Mar 2021 12:41)  HR: 74 (09 Mar 2021 06:28) (74 - 81)  BP: 130/75 (09 Mar 2021 06:28) (129/72 - 139/77)  BP(mean): --  RR: 18 (09 Mar 2021 06:28) (18 - 18)  SpO2: 100% (09 Mar 2021 06:28) (100% - 100%)  CAPILLARY BLOOD GLUCOSE        I&O's Summary      PHYSICAL EXAM:  GENERAL: NAD, well-developed  HEAD:  Atraumatic, Normocephalic  EYES: EOMI, PERRLA, conjunctiva and sclera clear  NECK: Supple, No JVD  CHEST/LUNG: Clear to auscultation bilaterally; No wheeze  HEART: Regular rate and rhythm; No murmurs, rubs, or gallops  ABDOMEN: Soft, Nontender, Nondistended; Bowel sounds present  EXTREMITIES:  2+ Peripheral Pulses, No clubbing, cyanosis, or edema  PSYCH: AAOx3  NEUROLOGY: non-focal  SKIN: No rashes or lesions    LABS:                        10.9   5.92  )-----------( 282      ( 09 Mar 2021 06:52 )             34.3     03-09    139  |  105  |  18  ----------------------------<  89  3.8   |  24  |  0.78    Ca    9.6      09 Mar 2021 06:50  Phos  3.3     03-09  Mg     2.0     03-09    TPro  6.5  /  Alb  3.7  /  TBili  <0.2  /  DBili  x   /  AST  24  /  ALT  26  /  AlkPhos  103  03-09      CARDIAC MARKERS ( 08 Mar 2021 06:46 )  x     / x     / 446 U/L / x     / x      CARDIAC MARKERS ( 07 Mar 2021 18:56 )  x     / x     / 468 U/L / x     / x          Urinalysis Basic - ( 07 Mar 2021 10:47 )    Color: Yellow / Appearance: Clear / S.019 / pH: x  Gluc: x / Ketone: Negative  / Bili: Negative / Urobili: <2 mg/dL   Blood: x / Protein: Trace / Nitrite: Negative   Leuk Esterase: Negative / RBC: x / WBC x   Sq Epi: x / Non Sq Epi: x / Bacteria: x        RADIOLOGY & ADDITIONAL TESTS:    Imaging Personally Reviewed:    Consultant(s) Notes Reviewed:      Care Discussed with Consultants/Other Providers:   Patient is a 70y old  Female who presents with a chief complaint of seizure, AMS (08 Mar 2021 13:52)      SUBJECTIVE / OVERNIGHT EVENTS: patient seen and examined by bedside, pt getting VEEG , denies headache, dizziness, SOB, CP, Palpitations , N/V/D, abdominal pain  no further Sz activity   pt says she had left breast pain before she came to hospital  , but now resolved       MEDICATIONS  (STANDING):  amLODIPine   Tablet 5 milliGRAM(s) Oral daily  calcium carbonate 1250 mG  + Vitamin D (OsCal 500 + D) 1 Tablet(s) Oral daily  cholecalciferol 1000 Unit(s) Oral daily  enoxaparin Injectable 40 milliGRAM(s) SubCutaneous every 24 hours  gemfibrozil 600 milliGRAM(s) Oral two times a day  influenza  Vaccine (HIGH DOSE) 0.7 milliLiter(s) IntraMuscular once  mesalamine DR Capsule 800 milliGRAM(s) Oral every 8 hours  pantoprazole    Tablet 40 milliGRAM(s) Oral before breakfast    MEDICATIONS  (PRN):  acetaminophen   Tablet .. 650 milliGRAM(s) Oral every 6 hours PRN Mild Pain (1 - 3), Moderate Pain (4 - 6)  LORazepam   Injectable 2 milliGRAM(s) IV Push once PRN Seizure activity      Vital Signs Last 24 Hrs  T(C): 36.6 (09 Mar 2021 06:28), Max: 36.7 (08 Mar 2021 12:41)  T(F): 97.8 (09 Mar 2021 06:28), Max: 98.1 (08 Mar 2021 12:41)  HR: 74 (09 Mar 2021 06:28) (74 - 81)  BP: 130/75 (09 Mar 2021 06:28) (129/72 - 139/77)  BP(mean): --  RR: 18 (09 Mar 2021 06:28) (18 - 18)  SpO2: 100% (09 Mar 2021 06:28) (100% - 100%)    PHYSICAL EXAM:  GENERAL: NAD, well-developed  HEAD:  Atraumatic, Normocephalic, marks of tongue bite b/l on the tongue   EYES: EOMI, PERRLA, conjunctiva and sclera clear  CHEST/LUNG: Clear to auscultation bilaterally; No wheeze  BREAST; no lumps appreciated on exam, no breast tenderness   HEART: Regular rate and rhythm;  ABDOMEN: Soft, Nontender, Nondistended; Bowel sounds present  EXTREMITIES:  2+ Peripheral Pulses, No clubbing, cyanosis, or edema  PSYCH: AAOx3  NEUROLOGY: non-focal  SKIN: No rashes or lesions          LABS:                        10.9   5.92  )-----------( 282      ( 09 Mar 2021 06:52 )             34.3     03-    139  |  105  |  18  ----------------------------<  89  3.8   |  24  |  0.78    Ca    9.6      09 Mar 2021 06:50  Phos  3.3     03-  Mg     2.0     03-09    TPro  6.5  /  Alb  3.7  /  TBili  <0.2  /  DBili  x   /  AST  24  /  ALT  26  /  AlkPhos  103  03-09      CARDIAC MARKERS ( 08 Mar 2021 06:46 )  x     / x     / 446 U/L / x     / x      CARDIAC MARKERS ( 07 Mar 2021 18:56 )  x     / x     / 468 U/L / x     / x          Urinalysis Basic - ( 07 Mar 2021 10:47 )    Color: Yellow / Appearance: Clear / S.019 / pH: x  Gluc: x / Ketone: Negative  / Bili: Negative / Urobili: <2 mg/dL   Blood: x / Protein: Trace / Nitrite: Negative   Leuk Esterase: Negative / RBC: x / WBC x   Sq Epi: x / Non Sq Epi: x / Bacteria: x        RADIOLOGY & ADDITIONAL TESTS:  EEG SUMMARY/CLASSIFICATION    Normal EEG in the awake, drowsy and asleep states.    _____________________________________________________________  EEG IMPRESSION/CLINICAL CORRELATE    Normal EEG study.  No epileptiform pattern or seizure seen.    Imaging Personally Reviewed:    Consultant(s) Notes Reviewed:  neurology     Care Discussed with Consultants/Other Providers: neurology

## 2021-03-09 NOTE — PROGRESS NOTE ADULT - SUBJECTIVE AND OBJECTIVE BOX
[facility]      Seen earlier today, and examined.  - Today, patient is without complaints.           *****MEDICATIONS: Current medication reviewed and documented.    MEDICATIONS  (STANDING):  amLODIPine   Tablet 5 milliGRAM(s) Oral daily  calcium carbonate 1250 mG  + Vitamin D (OsCal 500 + D) 1 Tablet(s) Oral daily  cholecalciferol 1000 Unit(s) Oral daily  enoxaparin Injectable 40 milliGRAM(s) SubCutaneous every 24 hours  gemfibrozil 600 milliGRAM(s) Oral two times a day  influenza  Vaccine (HIGH DOSE) 0.7 milliLiter(s) IntraMuscular once  mesalamine DR Capsule 800 milliGRAM(s) Oral every 8 hours  pantoprazole    Tablet 40 milliGRAM(s) Oral before breakfast  polyethylene glycol 3350 17 Gram(s) Oral daily    MEDICATIONS  (PRN):  acetaminophen   Tablet .. 650 milliGRAM(s) Oral every 6 hours PRN Mild Pain (1 - 3), Moderate Pain (4 - 6)  LORazepam   Injectable 2 milliGRAM(s) IV Push once PRN Seizure activity          ***** VITAL SIGNS:  T(F): 97.9 (03-09-21 @ 21:53), Max: 98.1 (03-09-21 @ 14:27)  HR: 71 (03-09-21 @ 21:53) (71 - 87)  BP: 123/91 (03-09-21 @ 21:53) (120/71 - 130/75)  RR: 18 (03-09-21 @ 21:53) (18 - 18)  SpO2: 100% (03-09-21 @ 21:53) (100% - 100%)  Wt(kg): --  ,   I&O's Summary           *****PHYSICAL EXAM:   alert oriented x 3 attention comprehension are fair.  Able to name, repeat.   EOmi fundi not visualized   no nystagmus VFF to confrontation  Tongue is midline  Palate elevates symmetrically   Moving all 4 ext spontaneously no drift appreciated    Gait not assessed.            *****LAB AND IMAGING:                        10.9   5.92  )-----------( 282      ( 09 Mar 2021 06:52 )             34.3               03-09    139  |  105  |  18  ----------------------------<  89  3.8   |  24  |  0.78    Ca    9.6      09 Mar 2021 06:50  Phos  3.3     03-09  Mg     2.0     03-09    TPro  6.5  /  Alb  3.7  /  TBili  <0.2  /  DBili  x   /  AST  24  /  ALT  26  /  AlkPhos  103  03-09           CARDIAC MARKERS ( 09 Mar 2021 06:50 )  x     / x     / 403 U/L / x     / x      CARDIAC MARKERS ( 08 Mar 2021 06:46 )  x     / x     / 446 U/L / x     / x                    [All pertinent recent Imaging/Reports reviewed]           *****A S S E S S M E N T   A N D   P L A N :      Ankiat Philip is a 70 year old LH female with a past medical history of HTN and ulcerative colitis who presented to the ED after a witnessed episode of convulsion.    Impression: second apparently unprovoked seizure of unclear origin occurring during sleep. Etiology of seizure unclear.  Semiology: generalized convulsions occurring during sleep lasting for several minutes with associated tongue biting    Recs:  []   vEEG  neg for sz   [] CPK, ammonia   [] will florian mri sz protoocl with contrast c/w moderate microvasular disease   [] given prior sz 12/20 high risk for recurrence would favor treating   [] no clear trigger possibly related to microvascular disease   {] discussed nys driving guidelines  [] antiphospholipid antibodies   [] keppra 500 bid started     [] will f/u outpatient upon discharge with epilepsy neurology at [address] [place] [address]. ([phone])      Thank you for allowing me to participate in the care of this patient. Will continue to follow patient periodically. Please do not hesitate to call me if you have any  questions or if there has been a change in patients neurological status     ________________  Yareli Colunga MD  [facility]  [phone]      33 minutes spent on total encounter; more than 50 % of the visit was  spent counseling about plan of care, compliance to diet/exercise and medication regimen and or  coordinating care by the attending physician.      It is advised that stroke patients follow up with BOOKER Gomes @ [phone] in 1- 2 weeks.   Others please follow up with Dr. Michael Nissenbaum [phone]

## 2021-03-09 NOTE — PROGRESS NOTE ADULT - ASSESSMENT
71 yo female PMHx HTN, GERD, Osteoporosis, OA and UC p/w an episode of convulsions followed by confusion this morning, admitted to medicine for Seizure.

## 2021-03-09 NOTE — ED POST DISCHARGE NOTE - REASON FOR FOLLOW-UP
Patient's son called asking for a letter sating that he took his mother to ED on 3/7/21 for work. Other

## 2021-03-10 LAB
CK SERPL-CCNC: 258 U/L — HIGH (ref 25–170)
LUPUS ANTICOAGULANT PROFILE RESULT: SIGNIFICANT CHANGE UP

## 2021-03-10 PROCEDURE — 99233 SBSQ HOSP IP/OBS HIGH 50: CPT

## 2021-03-10 RX ORDER — LEVETIRACETAM 250 MG/1
500 TABLET, FILM COATED ORAL
Refills: 0 | Status: DISCONTINUED | OUTPATIENT
Start: 2021-03-10 | End: 2021-03-11

## 2021-03-10 RX ADMIN — Medication 800 MILLIGRAM(S): at 09:33

## 2021-03-10 RX ADMIN — LEVETIRACETAM 500 MILLIGRAM(S): 250 TABLET, FILM COATED ORAL at 05:46

## 2021-03-10 RX ADMIN — Medication 800 MILLIGRAM(S): at 17:52

## 2021-03-10 RX ADMIN — Medication 600 MILLIGRAM(S): at 05:31

## 2021-03-10 RX ADMIN — AMLODIPINE BESYLATE 5 MILLIGRAM(S): 2.5 TABLET ORAL at 05:32

## 2021-03-10 RX ADMIN — Medication 1000 UNIT(S): at 12:35

## 2021-03-10 RX ADMIN — PANTOPRAZOLE SODIUM 40 MILLIGRAM(S): 20 TABLET, DELAYED RELEASE ORAL at 05:32

## 2021-03-10 RX ADMIN — ENOXAPARIN SODIUM 40 MILLIGRAM(S): 100 INJECTION SUBCUTANEOUS at 22:24

## 2021-03-10 RX ADMIN — Medication 1 TABLET(S): at 12:35

## 2021-03-10 RX ADMIN — LEVETIRACETAM 500 MILLIGRAM(S): 250 TABLET, FILM COATED ORAL at 17:55

## 2021-03-10 RX ADMIN — POLYETHYLENE GLYCOL 3350 17 GRAM(S): 17 POWDER, FOR SOLUTION ORAL at 12:35

## 2021-03-10 RX ADMIN — Medication 600 MILLIGRAM(S): at 17:51

## 2021-03-10 RX ADMIN — Medication 800 MILLIGRAM(S): at 22:24

## 2021-03-10 NOTE — PROGRESS NOTE ADULT - SUBJECTIVE AND OBJECTIVE BOX
Patient is a 70y old  Female who presents with a chief complaint of seizure, AMS (09 Mar 2021 13:03)      SUBJECTIVE / OVERNIGHT EVENTS:    MEDICATIONS  (STANDING):  amLODIPine   Tablet 5 milliGRAM(s) Oral daily  calcium carbonate 1250 mG  + Vitamin D (OsCal 500 + D) 1 Tablet(s) Oral daily  cholecalciferol 1000 Unit(s) Oral daily  enoxaparin Injectable 40 milliGRAM(s) SubCutaneous every 24 hours  gemfibrozil 600 milliGRAM(s) Oral two times a day  influenza  Vaccine (HIGH DOSE) 0.7 milliLiter(s) IntraMuscular once  levETIRAcetam 500 milliGRAM(s) Oral two times a day  mesalamine DR Capsule 800 milliGRAM(s) Oral every 8 hours  pantoprazole    Tablet 40 milliGRAM(s) Oral before breakfast  polyethylene glycol 3350 17 Gram(s) Oral daily    MEDICATIONS  (PRN):  acetaminophen   Tablet .. 650 milliGRAM(s) Oral every 6 hours PRN Mild Pain (1 - 3), Moderate Pain (4 - 6)  LORazepam   Injectable 2 milliGRAM(s) IV Push once PRN Seizure activity      Vital Signs Last 24 Hrs  T(C): 36.6 (10 Mar 2021 05:30), Max: 36.7 (09 Mar 2021 14:27)  T(F): 97.8 (10 Mar 2021 05:30), Max: 98.1 (09 Mar 2021 14:27)  HR: 78 (10 Mar 2021 05:30) (71 - 87)  BP: 123/93 (10 Mar 2021 05:30) (120/71 - 123/93)  BP(mean): --  RR: 18 (10 Mar 2021 05:30) (18 - 18)  SpO2: 100% (10 Mar 2021 05:30) (100% - 100%)  CAPILLARY BLOOD GLUCOSE        I&O's Summary      PHYSICAL EXAM:  GENERAL: NAD, well-developed  HEAD:  Atraumatic, Normocephalic  EYES: EOMI, PERRLA, conjunctiva and sclera clear  NECK: Supple, No JVD  CHEST/LUNG: Clear to auscultation bilaterally; No wheeze  HEART: Regular rate and rhythm; No murmurs, rubs, or gallops  ABDOMEN: Soft, Nontender, Nondistended; Bowel sounds present  EXTREMITIES:  2+ Peripheral Pulses, No clubbing, cyanosis, or edema  PSYCH: AAOx3  NEUROLOGY: non-focal  SKIN: No rashes or lesions    LABS:                        10.9   5.92  )-----------( 282      ( 09 Mar 2021 06:52 )             34.3     03-09    139  |  105  |  18  ----------------------------<  89  3.8   |  24  |  0.78    Ca    9.6      09 Mar 2021 06:50  Phos  3.3     03-09  Mg     2.0     03-09    TPro  6.5  /  Alb  3.7  /  TBili  <0.2  /  DBili  x   /  AST  24  /  ALT  26  /  AlkPhos  103  03-09    PT/INR - ( 10 Mar 2021 08:36 )   PT: 11.9 sec;   INR: 1.04 ratio         PTT - ( 10 Mar 2021 08:36 )  PTT:36.6 sec  CARDIAC MARKERS ( 10 Mar 2021 08:36 )  x     / x     / 258 U/L / x     / x      CARDIAC MARKERS ( 09 Mar 2021 06:50 )  x     / x     / 403 U/L / x     / x              RADIOLOGY & ADDITIONAL TESTS:  < from: MR Brain-Seizure, Epilepsy w/wo IV Cont (03.09.21 @ 21:11) >  FINDINGS:  The ventricles and sulci are normal in size shape and configuration. The basal cisterns are adequately patent. There are scattered T2/FLAIR hyperintensities in the subcortical and periventricular white matter which are nonspecific but most commonly represent chronic microvascular ischemic changes.    There is no abnormal enhancement intracranially. There is no acute infarction, intracranial hemorrhage, mass lesion,mass effect or herniation. There is no abnormal intraparenchymal or meningeal enhancement. There is asymmetric mild diffusion restriction involving the lateral aspect of the right frontal lobe right temporal lobe and amygdala.    The flow-voids of the major central arterial circulation at the skull base are normal, suggestive of of their patency. There is prominent susceptibility along the right MCA which is of uncertain significance.    The visualized globes are symmetric bilaterally.    The paranasal sinuses and tympanomastoid air cells are clear.      IMPRESSION:    There is no abnormal intracranial enhancement.    There is prominent susceptibility along the right MCA which is of uncertain significance. The possibility of nonocclusive thrombus cannot be entirely excluded. Further correlation with MRI angiography/CT angiography of the brain is recommended to exclude this possibility.    Asymmetric diffusion restriction involving the right frontal lobe, right temporal lobe and amygdala as described may represent ischemic changes.    There is no intracranial hemorrhage or acute infarction.    Notification to clinician of alert:    Provider FOSTER Hollins was notified about  the impression 0839  on  3/10/2021   via telephone by Neuroradiologist NANCY Stevens.  Readback confirmation was obtained.    < end of copied text >    Imaging Personally Reviewed:    Consultant(s) Notes Reviewed:      Care Discussed with Consultants/Other Providers:   Patient is a 70y old  Female who presents with a chief complaint of seizure, AMS (09 Mar 2021 13:03)      SUBJECTIVE / OVERNIGHT EVENTS: patient seen and examined by bedside, denies headache, dizziness, SOB, CP, Palpitations , N/V/D, abdominal pain        MEDICATIONS  (STANDING):  amLODIPine   Tablet 5 milliGRAM(s) Oral daily  calcium carbonate 1250 mG  + Vitamin D (OsCal 500 + D) 1 Tablet(s) Oral daily  cholecalciferol 1000 Unit(s) Oral daily  enoxaparin Injectable 40 milliGRAM(s) SubCutaneous every 24 hours  gemfibrozil 600 milliGRAM(s) Oral two times a day  influenza  Vaccine (HIGH DOSE) 0.7 milliLiter(s) IntraMuscular once  levETIRAcetam 500 milliGRAM(s) Oral two times a day  mesalamine DR Capsule 800 milliGRAM(s) Oral every 8 hours  pantoprazole    Tablet 40 milliGRAM(s) Oral before breakfast  polyethylene glycol 3350 17 Gram(s) Oral daily    MEDICATIONS  (PRN):  acetaminophen   Tablet .. 650 milliGRAM(s) Oral every 6 hours PRN Mild Pain (1 - 3), Moderate Pain (4 - 6)  LORazepam   Injectable 2 milliGRAM(s) IV Push once PRN Seizure activity      Vital Signs Last 24 Hrs  T(C): 36.6 (10 Mar 2021 05:30), Max: 36.7 (09 Mar 2021 14:27)  T(F): 97.8 (10 Mar 2021 05:30), Max: 98.1 (09 Mar 2021 14:27)  HR: 78 (10 Mar 2021 05:30) (71 - 87)  BP: 123/93 (10 Mar 2021 05:30) (120/71 - 123/93)  BP(mean): --  RR: 18 (10 Mar 2021 05:30) (18 - 18)  SpO2: 100% (10 Mar 2021 05:30) (100% - 100%)  CAPILLARY BLOOD GLUCOSE        I&O's Summary      PHYSICAL EXAM:  GENERAL: NAD, well-developed  HEAD:  Atraumatic, Normocephalic,   EYES: EOMI, PERRLA, conjunctiva and sclera clear  CHEST/LUNG: Clear to auscultation bilaterally; No wheeze  BREAST; no lumps appreciated on exam, no breast tenderness   HEART: Regular rate and rhythm;  ABDOMEN: Soft, Nontender, Nondistended; Bowel sounds present  EXTREMITIES:  2+ Peripheral Pulses, No clubbing, cyanosis, or edema  PSYCH: AAOx3  NEUROLOGY: non-focal  SKIN: No rashes or lesions      LABS:                        10.9   5.92  )-----------( 282      ( 09 Mar 2021 06:52 )             34.3     03-09    139  |  105  |  18  ----------------------------<  89  3.8   |  24  |  0.78    Ca    9.6      09 Mar 2021 06:50  Phos  3.3     03-09  Mg     2.0     03-09    TPro  6.5  /  Alb  3.7  /  TBili  <0.2  /  DBili  x   /  AST  24  /  ALT  26  /  AlkPhos  103  03-09    PT/INR - ( 10 Mar 2021 08:36 )   PT: 11.9 sec;   INR: 1.04 ratio         PTT - ( 10 Mar 2021 08:36 )  PTT:36.6 sec  CARDIAC MARKERS ( 10 Mar 2021 08:36 )  x     / x     / 258 U/L / x     / x      CARDIAC MARKERS ( 09 Mar 2021 06:50 )  x     / x     / 403 U/L / x     / x              RADIOLOGY & ADDITIONAL TESTS:  < from: MR Brain-Seizure, Epilepsy w/wo IV Cont (03.09.21 @ 21:11) >  FINDINGS:  The ventricles and sulci are normal in size shape and configuration. The basal cisterns are adequately patent. There are scattered T2/FLAIR hyperintensities in the subcortical and periventricular white matter which are nonspecific but most commonly represent chronic microvascular ischemic changes.    There is no abnormal enhancement intracranially. There is no acute infarction, intracranial hemorrhage, mass lesion,mass effect or herniation. There is no abnormal intraparenchymal or meningeal enhancement. There is asymmetric mild diffusion restriction involving the lateral aspect of the right frontal lobe right temporal lobe and amygdala.    The flow-voids of the major central arterial circulation at the skull base are normal, suggestive of of their patency. There is prominent susceptibility along the right MCA which is of uncertain significance.    The visualized globes are symmetric bilaterally.    The paranasal sinuses and tympanomastoid air cells are clear.      IMPRESSION:    There is no abnormal intracranial enhancement.    There is prominent susceptibility along the right MCA which is of uncertain significance. The possibility of nonocclusive thrombus cannot be entirely excluded. Further correlation with MRI angiography/CT angiography of the brain is recommended to exclude this possibility.    Asymmetric diffusion restriction involving the right frontal lobe, right temporal lobe and amygdala as described may represent ischemic changes.    There is no intracranial hemorrhage or acute infarction.        < end of copied text >    Imaging Personally Reviewed:    Consultant(s) Notes Reviewed:      Care Discussed with Consultants/Other Providers:

## 2021-03-10 NOTE — PROGRESS NOTE ADULT - PROBLEM SELECTOR PLAN 1
Seizure precautions  -House neuro consulted  -Ativan IV 2mg if convulsion re-occur for >3 mins   -24hr Video EEG, Normal EEG study. No epileptiform pattern or seizure seen.  -ammonia wnl , CK slightly elevated, will repeat   -Neurology f/u appreciated, case discussed , recommend MRI with con, Sz protocol , will check antiphospholipid Ab   will consider LP and CSF studies if the w/u is unremarkable Seizure precautions  -House neuro consulted  -Ativan IV 2mg if convulsion re-occur for >3 mins   -24hr Video EEG, Normal EEG study. No epileptiform pattern or seizure seen.  -ammonia wnl , CK slightly elevated, will repeat   , recommend MRI with con Sz protocol noted as above  , showing  prominent susceptibility along the right MCA which is of uncertain significance, the possibility of nonocclusive thrombus cannot be entirely excluded. , case was d/w Neurology , recommend checking MRA   f/u antiphospholipid Ab   will consider LP and CSF studies if the w/u is unremarkable

## 2021-03-11 ENCOUNTER — TRANSCRIPTION ENCOUNTER (OUTPATIENT)
Age: 71
End: 2021-03-11

## 2021-03-11 VITALS
HEART RATE: 78 BPM | RESPIRATION RATE: 18 BRPM | SYSTOLIC BLOOD PRESSURE: 105 MMHG | DIASTOLIC BLOOD PRESSURE: 68 MMHG | OXYGEN SATURATION: 100 % | TEMPERATURE: 98 F

## 2021-03-11 LAB
ALBUMIN SERPL ELPH-MCNC: 4.2 G/DL — SIGNIFICANT CHANGE UP (ref 3.3–5)
ALP SERPL-CCNC: 112 U/L — SIGNIFICANT CHANGE UP (ref 40–120)
ALT FLD-CCNC: 27 U/L — SIGNIFICANT CHANGE UP (ref 4–33)
ANION GAP SERPL CALC-SCNC: 13 MMOL/L — SIGNIFICANT CHANGE UP (ref 7–14)
AST SERPL-CCNC: 25 U/L — SIGNIFICANT CHANGE UP (ref 4–32)
B2 GLYCOPROT1 AB SER QL: NEGATIVE — SIGNIFICANT CHANGE UP
BILIRUB SERPL-MCNC: 0.3 MG/DL — SIGNIFICANT CHANGE UP (ref 0.2–1.2)
BUN SERPL-MCNC: 19 MG/DL — SIGNIFICANT CHANGE UP (ref 7–23)
CALCIUM SERPL-MCNC: 10.4 MG/DL — SIGNIFICANT CHANGE UP (ref 8.4–10.5)
CARDIOLIPIN AB SER-ACNC: POSITIVE
CHLORIDE SERPL-SCNC: 104 MMOL/L — SIGNIFICANT CHANGE UP (ref 98–107)
CO2 SERPL-SCNC: 23 MMOL/L — SIGNIFICANT CHANGE UP (ref 22–31)
CREAT SERPL-MCNC: 0.8 MG/DL — SIGNIFICANT CHANGE UP (ref 0.5–1.3)
GLUCOSE SERPL-MCNC: 81 MG/DL — SIGNIFICANT CHANGE UP (ref 70–99)
HCT VFR BLD CALC: 38.4 % — SIGNIFICANT CHANGE UP (ref 34.5–45)
HGB BLD-MCNC: 12.2 G/DL — SIGNIFICANT CHANGE UP (ref 11.5–15.5)
MAGNESIUM SERPL-MCNC: 2.2 MG/DL — SIGNIFICANT CHANGE UP (ref 1.6–2.6)
MCHC RBC-ENTMCNC: 27.8 PG — SIGNIFICANT CHANGE UP (ref 27–34)
MCHC RBC-ENTMCNC: 31.8 GM/DL — LOW (ref 32–36)
MCV RBC AUTO: 87.5 FL — SIGNIFICANT CHANGE UP (ref 80–100)
NRBC # BLD: 0 /100 WBCS — SIGNIFICANT CHANGE UP
NRBC # FLD: 0 K/UL — SIGNIFICANT CHANGE UP
PHOSPHATE SERPL-MCNC: 4.1 MG/DL — SIGNIFICANT CHANGE UP (ref 2.5–4.5)
PLATELET # BLD AUTO: 314 K/UL — SIGNIFICANT CHANGE UP (ref 150–400)
POTASSIUM SERPL-MCNC: 4 MMOL/L — SIGNIFICANT CHANGE UP (ref 3.5–5.3)
POTASSIUM SERPL-SCNC: 4 MMOL/L — SIGNIFICANT CHANGE UP (ref 3.5–5.3)
PROT SERPL-MCNC: 7.1 G/DL — SIGNIFICANT CHANGE UP (ref 6–8.3)
RBC # BLD: 4.39 M/UL — SIGNIFICANT CHANGE UP (ref 3.8–5.2)
RBC # FLD: 15.1 % — HIGH (ref 10.3–14.5)
SODIUM SERPL-SCNC: 140 MMOL/L — SIGNIFICANT CHANGE UP (ref 135–145)
WBC # BLD: 5.66 K/UL — SIGNIFICANT CHANGE UP (ref 3.8–10.5)
WBC # FLD AUTO: 5.66 K/UL — SIGNIFICANT CHANGE UP (ref 3.8–10.5)

## 2021-03-11 PROCEDURE — 70544 MR ANGIOGRAPHY HEAD W/O DYE: CPT | Mod: 26,59

## 2021-03-11 PROCEDURE — 70551 MRI BRAIN STEM W/O DYE: CPT | Mod: 26

## 2021-03-11 PROCEDURE — 99239 HOSP IP/OBS DSCHRG MGMT >30: CPT

## 2021-03-11 RX ORDER — POLYETHYLENE GLYCOL 3350 17 G/17G
17 POWDER, FOR SOLUTION ORAL
Qty: 0 | Refills: 0 | DISCHARGE
Start: 2021-03-11

## 2021-03-11 RX ORDER — MELOXICAM 15 MG/1
1 TABLET ORAL
Qty: 0 | Refills: 0 | DISCHARGE

## 2021-03-11 RX ORDER — ACETAMINOPHEN 500 MG
2 TABLET ORAL
Qty: 0 | Refills: 0 | DISCHARGE
Start: 2021-03-11

## 2021-03-11 RX ORDER — ASPIRIN/CALCIUM CARB/MAGNESIUM 324 MG
81 TABLET ORAL DAILY
Refills: 0 | Status: DISCONTINUED | OUTPATIENT
Start: 2021-03-11 | End: 2021-03-11

## 2021-03-11 RX ORDER — LEVETIRACETAM 250 MG/1
1 TABLET, FILM COATED ORAL
Qty: 60 | Refills: 0
Start: 2021-03-11 | End: 2021-04-09

## 2021-03-11 RX ORDER — ASPIRIN/CALCIUM CARB/MAGNESIUM 324 MG
1 TABLET ORAL
Qty: 30 | Refills: 0
Start: 2021-03-11 | End: 2021-04-09

## 2021-03-11 RX ADMIN — LEVETIRACETAM 500 MILLIGRAM(S): 250 TABLET, FILM COATED ORAL at 05:32

## 2021-03-11 RX ADMIN — Medication 1000 UNIT(S): at 13:10

## 2021-03-11 RX ADMIN — Medication 600 MILLIGRAM(S): at 05:32

## 2021-03-11 RX ADMIN — AMLODIPINE BESYLATE 5 MILLIGRAM(S): 2.5 TABLET ORAL at 05:32

## 2021-03-11 RX ADMIN — POLYETHYLENE GLYCOL 3350 17 GRAM(S): 17 POWDER, FOR SOLUTION ORAL at 13:10

## 2021-03-11 RX ADMIN — Medication 81 MILLIGRAM(S): at 13:14

## 2021-03-11 RX ADMIN — Medication 800 MILLIGRAM(S): at 08:45

## 2021-03-11 RX ADMIN — PANTOPRAZOLE SODIUM 40 MILLIGRAM(S): 20 TABLET, DELAYED RELEASE ORAL at 05:32

## 2021-03-11 RX ADMIN — Medication 800 MILLIGRAM(S): at 16:04

## 2021-03-11 RX ADMIN — Medication 1 TABLET(S): at 13:10

## 2021-03-11 NOTE — PROGRESS NOTE ADULT - PROBLEM SELECTOR PLAN 2
Monitor BP daily  DASH diet  Continue norvasc Monitor BP daily  DASH diet  Continue [facility]  HLD : pt on gemfibrozil , not on statin, likely not able to tolerate, f/u with PMD

## 2021-03-11 NOTE — DISCHARGE NOTE NURSING/CASE MANAGEMENT/SOCIAL WORK - NSSCTYPOFSERV_GEN_ALL_CORE
Nurse will visit patient the day after discharge. Nurse will telephone patient to schedule visit time.

## 2021-03-11 NOTE — DISCHARGE NOTE NURSING/CASE MANAGEMENT/SOCIAL WORK - NSDCFUADDAPPT_GEN_ALL_CORE_FT
Please call to follow up with Dr. Cameron Oliveira Neurology [phone] [address], [place] [address] within 1 week.    Please also call to arrange follow up with your primary care physician and with GYN for further evaluation in 1-2 weeks.

## 2021-03-11 NOTE — DISCHARGE NOTE PROVIDER - NSDCFUADDINST_GEN_ALL_CORE_FT
Please adhere to seizure precautions to not drive, operate heavy machinery, avoid heights, pools, bathtubs, locked doors.

## 2021-03-11 NOTE — PROGRESS NOTE ADULT - SUBJECTIVE AND OBJECTIVE BOX
Patient is a 70y old  Female who presents with a chief complaint of seizure, AMS (10 Mar 2021 10:40)      SUBJECTIVE / OVERNIGHT EVENTS:    MEDICATIONS  (STANDING):  amLODIPine   Tablet 5 milliGRAM(s) Oral daily  calcium carbonate 1250 mG  + Vitamin D (OsCal 500 + D) 1 Tablet(s) Oral daily  cholecalciferol 1000 Unit(s) Oral daily  enoxaparin Injectable 40 milliGRAM(s) SubCutaneous every 24 hours  gemfibrozil 600 milliGRAM(s) Oral two times a day  influenza  Vaccine (HIGH DOSE) 0.7 milliLiter(s) IntraMuscular once  levETIRAcetam 500 milliGRAM(s) Oral two times a day  mesalamine DR Capsule 800 milliGRAM(s) Oral every 8 hours  pantoprazole    Tablet 40 milliGRAM(s) Oral before breakfast  polyethylene glycol 3350 17 Gram(s) Oral daily    MEDICATIONS  (PRN):  acetaminophen   Tablet .. 650 milliGRAM(s) Oral every 6 hours PRN Mild Pain (1 - 3), Moderate Pain (4 - 6)  LORazepam   Injectable 2 milliGRAM(s) IV Push once PRN Seizure activity      Vital Signs Last 24 Hrs  T(C): 36.5 (11 Mar 2021 05:30), Max: 36.9 (10 Mar 2021 22:01)  T(F): 97.7 (11 Mar 2021 05:30), Max: 98.4 (10 Mar 2021 22:01)  HR: 62 (11 Mar 2021 05:30) (62 - 90)  BP: 114/59 (11 Mar 2021 05:30) (108/62 - 128/88)  BP(mean): --  RR: 18 (11 Mar 2021 05:30) (17 - 18)  SpO2: 100% (11 Mar 2021 05:30) (100% - 100%)  CAPILLARY BLOOD GLUCOSE        I&O's Summary      PHYSICAL EXAM:  GENERAL: NAD, well-developed  HEAD:  Atraumatic, Normocephalic  EYES: EOMI, PERRLA, conjunctiva and sclera clear  NECK: Supple, No JVD  CHEST/LUNG: Clear to auscultation bilaterally; No wheeze  HEART: Regular rate and rhythm; No murmurs, rubs, or gallops  ABDOMEN: Soft, Nontender, Nondistended; Bowel sounds present  EXTREMITIES:  2+ Peripheral Pulses, No clubbing, cyanosis, or edema  PSYCH: AAOx3  NEUROLOGY: non-focal  SKIN: No rashes or lesions    LABS:                        12.2   5.66  )-----------( 314      ( 11 Mar 2021 07:37 )             38.4     03-11    140  |  104  |  19  ----------------------------<  81  4.0   |  23  |  0.80    Ca    10.4      11 Mar 2021 07:37  Phos  4.1     03-11  Mg     2.2     03-11    TPro  7.1  /  Alb  4.2  /  TBili  0.3  /  DBili  x   /  AST  25  /  ALT  27  /  AlkPhos  112  03-11    PT/INR - ( 10 Mar 2021 08:36 )   PT: 11.9 sec;   INR: 1.04 ratio         PTT - ( 10 Mar 2021 08:36 )  PTT:36.6 sec  CARDIAC MARKERS ( 10 Mar 2021 08:36 )  x     / x     / 258 U/L / x     / x              RADIOLOGY & ADDITIONAL TESTS:    Imaging Personally Reviewed:    Consultant(s) Notes Reviewed:      Care Discussed with Consultants/Other Providers:   Patient is a 70y old  Female who presents with a chief complaint of seizure, AMS (10 Mar 2021 10:40)      SUBJECTIVE / OVERNIGHT EVENTS: patient seen and examined by bedside, pt denies headache, dizziness, SOB, CP, Palpitations , N/V/D, abdominal pain  pt reports pain in LE       MEDICATIONS  (STANDING):  amLODIPine   Tablet 5 milliGRAM(s) Oral daily  calcium carbonate 1250 mG  + Vitamin D (OsCal 500 + D) 1 Tablet(s) Oral daily  cholecalciferol 1000 Unit(s) Oral daily  enoxaparin Injectable 40 milliGRAM(s) SubCutaneous every 24 hours  gemfibrozil 600 milliGRAM(s) Oral two times a day  influenza  Vaccine (HIGH DOSE) 0.7 milliLiter(s) IntraMuscular once  levETIRAcetam 500 milliGRAM(s) Oral two times a day  mesalamine DR Capsule 800 milliGRAM(s) Oral every 8 hours  pantoprazole    Tablet 40 milliGRAM(s) Oral before breakfast  polyethylene glycol 3350 17 Gram(s) Oral daily    MEDICATIONS  (PRN):  acetaminophen   Tablet .. 650 milliGRAM(s) Oral every 6 hours PRN Mild Pain (1 - 3), Moderate Pain (4 - 6)  LORazepam   Injectable 2 milliGRAM(s) IV Push once PRN Seizure activity      Vital Signs Last 24 Hrs  T(C): 36.5 (11 Mar 2021 05:30), Max: 36.9 (10 Mar 2021 22:01)  T(F): 97.7 (11 Mar 2021 05:30), Max: 98.4 (10 Mar 2021 22:01)  HR: 62 (11 Mar 2021 05:30) (62 - 90)  BP: 114/59 (11 Mar 2021 05:30) (108/62 - 128/88)  BP(mean): --  RR: 18 (11 Mar 2021 05:30) (17 - 18)  SpO2: 100% (11 Mar 2021 05:30) (100% - 100%)  CAPILLARY BLOOD GLUCOSE        I&O's Summary      PHYSICAL EXAM:  GENERAL: NAD, well-developed  HEAD:  Atraumatic, Normocephalic,   EYES: EOMI, PERRLA, conjunctiva and sclera clear  CHEST/LUNG: Clear to auscultation bilaterally; No wheeze  BREAST; no lumps appreciated on exam, no breast tenderness   HEART: Regular rate and rhythm;  ABDOMEN: Soft, Nontender, Nondistended; Bowel sounds present  EXTREMITIES:  2+ Peripheral Pulses, No clubbing, cyanosis, or edema  PSYCH: AAOx3  NEUROLOGY: non-focal  SKIN: No rashes or lesions    LABS:                        12.2   5.66  )-----------( 314      ( 11 Mar 2021 07:37 )             38.4     03-11    140  |  104  |  19  ----------------------------<  81  4.0   |  23  |  0.80    Ca    10.4      11 Mar 2021 07:37  Phos  4.1     03-11  Mg     2.2     03-11    TPro  7.1  /  Alb  4.2  /  TBili  0.3  /  DBili  x   /  AST  25  /  ALT  27  /  AlkPhos  112  03-11    PT/INR - ( 10 Mar 2021 08:36 )   PT: 11.9 sec;   INR: 1.04 ratio         PTT - ( 10 Mar 2021 08:36 )  PTT:36.6 sec  CARDIAC MARKERS ( 10 Mar 2021 08:36 )  x     / x     / 258 U/L / x     / x              RADIOLOGY & ADDITIONAL TESTS:  < from: MR Brain-Seizure, Epilepsy w/wo IV Cont (03.09.21 @ 21:11) >  FINDINGS:  The ventricles and sulci are normal in size shape and configuration. The basal cisterns are adequately patent. There are scattered T2/FLAIR hyperintensities in the subcortical and periventricular white matter which are nonspecific but most commonly represent chronic microvascular ischemic changes.    There is no abnormal enhancement intracranially. There is no acute infarction, intracranial hemorrhage, mass lesion,mass effect or herniation. There is no abnormal intraparenchymal or meningeal enhancement. There is asymmetric mild diffusion restriction involving the lateral aspect of the right frontal lobe right temporal lobe and amygdala.    The flow-voids of the major central arterial circulation at the skull base are normal, suggestive of of their patency. There is prominent susceptibility along the right MCA which is of uncertain significance.    The visualized globes are symmetric bilaterally.    The paranasal sinuses and tympanomastoid air cells are clear.      IMPRESSION:    There is no abnormal intracranial enhancement.    There is prominent susceptibility along the right MCA which is of uncertain significance. The possibility of nonocclusive thrombus cannot be entirely excluded. Further correlation with MRI angiography/CT angiography of the brain is recommended to exclude this possibility.    Asymmetric diffusion restriction involving the right frontal lobe, right temporal lobe and amygdala as described may represent ischemic changes.    There is no intracranial hemorrhage or acute infarction.      < from: MR Angio Head No Cont (03.11.21 @ 12:07) >  MRI Brain: Multiple patchy confluent nonspecific T2/FLAIR hyperintense signal changes are noted throughout the bihemispheric white matter without associated mass effect or restricted diffusion.    Previously noted abnormal diffusion signal within the right frontal lobe, right temporal lobe, and amygdala was artifactual on the prior MRI examination.    Ventricular size and configuration is unremarkable. No abnormal extra axial fluid collections are seen. Flow-voids are noted throughout the major intracranial vessels, on the T2 weighted images, consistent with their patency. The posterior fossa and sellar area appear grossly unremarkable.    The paranasal sinuses and mastoid air cells are clear. Calvarial signal appears unremarkable. The orbits appear within normal limits.    MRA [place]: There is mild hypoplasia of the right [id number]. Otherwise, the bilateral intracranial internal carotid, anterior, and middle cerebral arteries appear unremarkable.    The anterior and bilateral posterior communicating arteries are notable.    The bilateral intradural vertebral arteries, vertebrobasilar junction, basilar artery, and basilar tip appear unremarkable as well as the bilateral posterior cerebral arteries.    IMPRESSION:    MRI BRAIN: No evidence of acute ischemia or acute intracranial hemorrhage.    Multiple nonspecific abnormal white matter foci of T2/FLAIR prolongation statistically favoring microvascular disease.    MRA HEAD: Unremarkable study. Patent right-sided MCA.        < end of copied text >    Imaging Personally Reviewed:    Consultant(s) Notes Reviewed:      Care Discussed with Consultants/Other Providers:

## 2021-03-11 NOTE — DISCHARGE NOTE PROVIDER - NSDCFUADDAPPT_GEN_ALL_CORE_FT
Please call to follow up with Dr. Cameron Oliveira Neurology [phone] [address], [place] [address] within 1 week.    Please also call to arrange follow up with your primary care physician and with GYN for further evaluation in 1-2 weeks.

## 2021-03-11 NOTE — CHART NOTE - NSCHARTNOTEFT_GEN_A_CORE
Case reviewed with neurology attending Dr. Colunga and MRA/MRI results reviewed today. MRA negative.   Per neuro, will continue keppra 500 PO BID upon discharge. Will start on ASA 81mg for prophylaxis and continue on gemfibrozil on discharge.   Patient instructed to follow up with Dr. Cameron Oliveira upon discharge.   Above recommendations discussed with Dr. Wilkinson who is in agreement and patient is cleared for discharge today.

## 2021-03-11 NOTE — DISCHARGE NOTE PROVIDER - NSFOLLOWUPCLINICS_GEN_ALL_ED_FT
[facility] [place]  Internal Medicine  [address] [place] [address]  Phone: [phone]  Fax: [phone]    [facility] Gynecology [facility] Obst[facility]  Gynceology/OB  [address] [place] [address]  Phone: [phone]  Fax:   Follow Up Time:

## 2021-03-11 NOTE — PROGRESS NOTE ADULT - ASSESSMENT
69 yo female PMHx HTN, GERD, Osteoporosis, OA and UC p/w an episode of convulsions followed by confusion this morning, admitted to medicine for Seizure.

## 2021-03-11 NOTE — PROGRESS NOTE ADULT - PROBLEM SELECTOR PLAN 4
Tylenol 650mg PRN pain Q6h

## 2021-03-11 NOTE — DISCHARGE NOTE PROVIDER - NSDCMRMEDTOKEN_GEN_ALL_CORE_FT
acetaminophen 325 mg oral tablet: 2 tab(s) orally every 6 hours, As needed, Mild Pain (1 - 3), Moderate Pain (4 - 6)  alendronate 70 mg oral tablet: 1 tab(s) orally once a week on Sundays  aspirin 81 mg oral delayed release tablet: 1 tab(s) orally once a day  Calcium 600+D oral tablet: 1 tab(s) orally once a day  gemfibrozil 600 mg oral tablet: 1 tab(s) orally 2 times a day  levETIRAcetam 500 mg oral tablet: 1 tab(s) orally 2 times a day  mesalamine 1.2 g oral delayed release tablet: 2 tab(s) orally once a day  Norvasc 5 mg oral tablet: 1 tab(s) orally once a day  omeprazole 40 mg oral delayed release capsule: 1 cap(s) orally once a day  polyethylene glycol 3350 oral powder for reconstitution: 17 gram(s) orally once a day  Vitamin D3 1000 intl units (25 mcg) oral tablet: 1 tab(s) orally once a day   acetaminophen 325 mg oral tablet: 2 tab(s) orally every 6 hours, As needed, Mild Pain (1 - 3), Moderate Pain (4 - 6)  alendronate 70 mg oral tablet: 1 tab(s) orally once a week on Sundays  aspirin 81 mg oral delayed release tablet: 1 tab(s) orally once a day  Calcium 600+D oral tablet: 1 tab(s) orally once a day  gemfibrozil 600 mg oral tablet: 1 tab(s) orally 2 times a day  levETIRAcetam 500 mg oral tablet: 1 tab(s) orally 2 times a day  mesalamine 1.2 g oral delayed release tablet: 2 tab(s) orally once a day  Norvasc 5 mg oral tablet: 1 tab(s) orally once a day  omeprazole 40 mg oral delayed release capsule: 1 cap(s) orally once a day  Outpatient Physical Therapy : Outpatient Physical Therapy   polyethylene glycol 3350 oral powder for reconstitution: 17 gram(s) orally once a day  Vitamin D3 1000 intl units (25 mcg) oral tablet: 1 tab(s) orally once a day

## 2021-03-11 NOTE — PROGRESS NOTE ADULT - PROBLEM SELECTOR PLAN 1
Seizure precautions  -House neuro consulted  -Ativan IV 2mg if convulsion re-occur for >3 mins   -24hr Video EEG, Normal EEG study. No epileptiform pattern or seizure seen.  -ammonia wnl , CK slightly elevated, will repeat   , recommend MRI with con Sz protocol noted as above  , showing  prominent susceptibility along the right MCA which is of uncertain significance, the possibility of nonocclusive thrombus cannot be entirely excluded. , case was d/w Neurology , recommend checking MRA   f/u antiphospholipid Ab   will consider LP and CSF studies if the w/u is unremarkable Seizure precautions  -House neuro consulted  -Ativan IV 2mg if convulsion re-occur for >3 mins   -24hr Video EEG, Normal EEG study. No epileptiform pattern or seizure seen.  -ammonia wnl , CK slightly elevated, will repeat   , recommend MRI with con, Sz protocol noted as above  , showing  prominent susceptibility along the right MCA which is of uncertain significance, the possibility of nonocclusive thrombus cannot be entirely excluded. , case was d/w Neurology , recommend checking MRA , MRA done which is unremarkable   f/u antiphospholipid Ab , pending in lab   pt can be discharged with outpt f/u with neurology as per Neurology, ,Neurology to f/u pending antiphospholipid labs , when pt f/u as outpt   ok to start baby aspirin as per neurology   pt tolerating Keppra well

## 2021-03-11 NOTE — PROGRESS NOTE ADULT - ATTENDING COMMENTS
will dc home today, PT luis had recommended outpt PT   Patient hemodynamically stable for discharge home  Time spent in discharge process is 40 min will dc home today, PT luis had recommended outpt PT   Patient hemodynamically stable for discharge home  Time spent in discharge process is 40 min  Message left for

## 2021-03-11 NOTE — DISCHARGE NOTE PROVIDER - CARE PROVIDER_API CALL
MARIBEL YOUNG [facility]  Internal Medicine  [address] [place] [address]  Phone: [phone]  Fax: [phone]  Follow Up Time:     Cameron Oliveira)  Neurology; Vascular Neurology  [address] [facility], [address]  [place] [address]  Phone: [phone]  Fax: [phone]  Follow Up Time:

## 2021-03-11 NOTE — DISCHARGE NOTE PROVIDER - HOSPITAL COURSE
71 yo female PMHx HTN, GERD, Osteoporosis, OA and UC p/w an episode of convulsions followed by confusion this morning, admitted to medicine for Seizure.    Seizure-like activity:  - Seizure precautions  - Neuro consulted - started on keppra  -24hr Video EEG, Normal EEG study. No epileptiform pattern or seizure seen.  - ammonia wnl , CK slightly elevated but trended down   - initial MRI brain showed prominent susceptibility along the right MCA which is of uncertain significance, the possibility of nonocclusive thrombus cannot be entirely excluded.   - case was discussed with neuro who recommended repeat MRI/MRA  - Repeat MRI/MRA showed: MRI Brain: No evidence of acute ischemia or acute intracranial hemorrhage. Multiple nonspecific abnormal white matter foci of T2/FLAIR prolongation statistically favoring microvascular disease.   MRA HEAD: Unremarkable study. Patent right-sided MCA.  - results reviewed with neuro Dr. Colunga on 3/11 - no further work up recommended at this time and patient is cleared for discharge from neuro perspective   - plan to continue keppra 500 PO BID upon discharge, start on ASA 81, continue gemfibrozil upon discharge  - Patient advised of seizure precautions: to not drive, operate heavy machinery, avoid heights, pools, bathtubs, locked doors  - APLS labs reviewed with neuro attending - negative so far, however beta 2 glycoprotein and anticardiolipin Ab still pending in lab at time of discharge and will need to be followed up as outpatient  - Plan to follow up with Dr. Cameron Oliveira [phone] [address], [place] [address] within 1 week     Hypertension:  - Monitor BP daily  - DASH diet  - Continue norvasc.     Chronic GERD:  - c/w PPI.     OA:  - Tylenol 650mg PRN pain Q6h.     Ulcerative colitis:  - c/w mesalamine.     Anemia:  - B12, Folate, TIBC wnl   - monitor H&H  - outpt w/u wit PMD.    Breast lump:  - no lump felt on exam  - outpt w/u with mammogram and US and primary care and GYN    On 3/11/21 this case was reviewed with Dr. Wilkinson, the patient is medically stable and optimized for discharge. All medications were reviewed and prescriptions were sent to mutually agreed upon pharmacy.  69 yo female PMHx HTN, GERD, Osteoporosis, OA and UC p/w an episode of convulsions followed by confusion this morning, admitted to medicine for Seizure.    Seizure-like activity:  - Seizure precautions  - Neuro consulted - started on keppra  -24hr Video EEG, Normal EEG study. No epileptiform pattern or seizure seen.  - ammonia wnl , CK slightly elevated but trended down   - initial MRI brain showed prominent susceptibility along the right MCA which is of uncertain significance, the possibility of nonocclusive thrombus cannot be entirely excluded.   - case was discussed with neuro who recommended repeat MRI/MRA  - Repeat MRI/MRA showed: MRI Brain: No evidence of acute ischemia or acute intracranial hemorrhage. Multiple nonspecific abnormal white matter foci of T2/FLAIR prolongation statistically favoring microvascular disease.   MRA HEAD: Unremarkable study. Patent right-sided MCA.  - results reviewed with neuro Dr. Colunga on 3/11 - no further work up recommended at this time and patient is cleared for discharge from neuro perspective   - plan to continue keppra 500 PO BID upon discharge, start on ASA 81, continue gemfibrozil upon discharge  - Given elevated lipid profile on december, please follow up with your primary care doctor and have your lipid profile rechecked for consideration of starting statin if indicated  - Patient advised of seizure precautions: to not drive, operate heavy machinery, avoid heights, pools, bathtubs, locked doors  - APLS labs reviewed with neuro attending - negative so far, however beta 2 glycoprotein and anticardiolipin Ab still pending in lab at time of discharge and will need to be followed up as outpatient  - Plan to follow up with Dr. Cameron Oliveira [phone] [address], [place] [address] within 1 week     Hypertension:  - Monitor BP daily  - DASH diet  - Continue norvasc.     Chronic GERD:  - c/w PPI.     OA:  - Tylenol 650mg PRN pain Q6h.     Ulcerative colitis:  - c/w mesalamine.     Anemia:  - B12, Folate, TIBC wnl   - monitor H&H  - outpt w/u wit PMD.    Breast lump:  - no lump felt on exam  - outpt w/u with mammogram and US and primary care and GYN    On 3/11/21 this case was reviewed with Dr. Wilkinson, the patient is medically stable and optimized for discharge. All medications were reviewed and prescriptions were sent to mutually agreed upon pharmacy.

## 2021-03-11 NOTE — DISCHARGE NOTE PROVIDER - NSDCCPCAREPLAN_GEN_ALL_CORE_FT
PRINCIPAL DISCHARGE DIAGNOSIS  Diagnosis: Seizure-like activity  Assessment and Plan of Treatment: You were admitted with seizure like activity and neurology was consulted. You were started on keppra, a sieuzre medicine, and should continue this as prescribed upon discharge. Your EEG was normal. Your  initial MRI brain showed prominent susceptibility along the right MCA which is of uncertain significance, the possibility of nonocclusive thrombus cannot be entirely excluded. You had a repeat MRI/MRA which showed: MRI Brain: No evidence of acute ischemia or acute intracranial hemorrhage. Multiple nonspecific abnormal white matter foci of T2/FLAIR prolongation statistically favoring microvascular disease. MRA HEAD: Unremarkable study. Patent right-sided MCA. YOu were also started on aspirin 81mg, and continue continue gemfibrozil upon discharge. Please adhere to seizure precautions to not drive, operate heavy machinery, avoid heights, pools, bathtubs, locked doors. Your beta 2 glycoprotein and anticardiolipin Ab still pending in lab at time of discharge and will need to be followed up as outpatient, please discuss these results with your neurologist.  Plan to follow up with Dr. Cameron Oliveira [phone] [address] [facility] [address], [place] [address] within 1 week .      SECONDARY DISCHARGE DIAGNOSES  Diagnosis: Ulcerative colitis  Assessment and Plan of Treatment: Continue with home mesalamine and follow up with your primary care provider.    Diagnosis: Anemia  Assessment and Plan of Treatment: You were noted with anemia during your hospitalization and should follow up with your primary care doctor further further management in 1-2 weeks.    Diagnosis: Breast lump  Assessment and Plan of Treatment: It is recommended that you have outpt w/u with mammogram and Ultrasuond and have close follow up with your primary care doctor and GYN for further evaluation within 1-2 weeks.    Diagnosis: Hypertension  Assessment and Plan of Treatment: Continue blood pressure medication regimen as directed. Monitor for any visual changes, headaches or dizziness.  Monitor blood pressure regularly.  Follow up with your primary care provider for further management for high blood pressure.     PRINCIPAL DISCHARGE DIAGNOSIS  Diagnosis: Seizure-like activity  Assessment and Plan of Treatment: You were admitted with seizure like activity and neurology was consulted. You were started on keppra, a sieuzre medicine, and should continue this as prescribed upon discharge. Your EEG was normal. Your  initial MRI brain showed prominent susceptibility along the right MCA which is of uncertain significance, the possibility of nonocclusive thrombus cannot be entirely excluded. You had a repeat MRI/MRA which showed: MRI Brain: No evidence of acute ischemia or acute intracranial hemorrhage. Multiple nonspecific abnormal white matter foci of T2/FLAIR prolongation statistically favoring microvascular disease. MRA HEAD: Unremarkable study. Patent right-sided MCA. YOu were also started on aspirin 81mg, and continue continue gemfibrozil upon discharge. Given elevated lipid profile on december, please follow up with your primary care doctor and have your lipid profile rechecked for consideration of starting statin if indicated. Please adhere to seizure precautions to not drive, operate heavy machinery, avoid heights, pools, bathtubs, locked doors. Your beta 2 glycoprotein and anticardiolipin Ab still pending in lab at time of discharge and will need to be followed up as outpatient, please discuss these results with your neurologist.  Plan to follow up with Dr. Cameron Oliveira [phone] [address], [place] [address] within 1 week .      SECONDARY DISCHARGE DIAGNOSES  Diagnosis: Ulcerative colitis  Assessment and Plan of Treatment: Continue with home mesalamine and follow up with your primary care provider.    Diagnosis: Anemia  Assessment and Plan of Treatment: You were noted with anemia during your hospitalization and should follow up with your primary care doctor further further management in 1-2 weeks.    Diagnosis: Breast lump  Assessment and Plan of Treatment: It is recommended that you have outpt w/u with mammogram and Ultrasuond and have close follow up with your primary care doctor and GYN for further evaluation within 1-2 weeks.    Diagnosis: Hypertension  Assessment and Plan of Treatment: Continue blood pressure medication regimen as directed. Monitor for any visual changes, headaches or dizziness.  Monitor blood pressure regularly.  Follow up with your primary care provider for further management for high blood pressure.

## 2021-03-12 NOTE — ED ADULT TRIAGE NOTE - BP NONINVASIVE SYSTOLIC (MM HG)
General: NAD, resting comfortably  Neuro: AAOX3, EOMI, PERRLA, no scleral icterus  EENT: R palpable thyroid goiter extending ~6cm laterally  Pulm: CTAB, Nonlabored breathing  CV: S1/S2 normal, no murmurs  Abdomen: soft, nondistended, nontender, no rebound, no guarding  Extremities: WWP, No LE edema b/l 139

## 2021-09-21 NOTE — DISCHARGE NOTE PROVIDER - NSDCQMAMI_CARD_ALL_CORE
No Procedure To Be Performed At Next Visit: I&D Detail Level: Detailed Introduction Text (Please End With A Colon): The following procedure was deferred:

## 2022-10-14 PROBLEM — K51.90 ULCERATIVE COLITIS, UNSPECIFIED, WITHOUT COMPLICATIONS: Chronic | Status: ACTIVE | Noted: 2021-03-07

## 2022-10-14 PROBLEM — K21.9 GASTRO-ESOPHAGEAL REFLUX DISEASE WITHOUT ESOPHAGITIS: Chronic | Status: ACTIVE | Noted: 2021-03-07

## 2022-10-14 PROBLEM — D64.9 ANEMIA, UNSPECIFIED: Chronic | Status: ACTIVE | Noted: 2021-03-07

## 2022-10-14 PROBLEM — M19.90 UNSPECIFIED OSTEOARTHRITIS, UNSPECIFIED SITE: Chronic | Status: ACTIVE | Noted: 2021-03-07

## 2022-10-14 PROBLEM — M81.0 AGE-RELATED OSTEOPOROSIS WITHOUT CURRENT PATHOLOGICAL FRACTURE: Chronic | Status: ACTIVE | Noted: 2021-03-07

## 2022-10-25 ENCOUNTER — APPOINTMENT (OUTPATIENT)
Dept: PLASTIC SURGERY | Facility: CLINIC | Age: 72
End: 2022-10-25

## 2022-10-25 VITALS
BODY MASS INDEX: 33.66 KG/M2 | SYSTOLIC BLOOD PRESSURE: 148 MMHG | HEIGHT: 65 IN | DIASTOLIC BLOOD PRESSURE: 87 MMHG | OXYGEN SATURATION: 98 % | WEIGHT: 202 LBS | HEART RATE: 92 BPM | TEMPERATURE: 97.8 F

## 2022-10-25 DIAGNOSIS — N62 HYPERTROPHY OF BREAST: ICD-10-CM

## 2022-10-25 PROCEDURE — 99215 OFFICE O/P EST HI 40 MIN: CPT

## 2022-10-27 PROBLEM — N62 BREAST HYPERTROPHY: Status: ACTIVE | Noted: 2020-08-19

## 2022-10-27 NOTE — REASON FOR VISIT
[Follow-Up: _____] : a [unfilled] follow-up visit [FreeTextEntry1] : Patient present to discuss breast reduction, she complaints of neck and back pain. Patient's takes Alivie as PRN and last mammo/sono in 04/22. She denies Hx/Fhx  of breast cancer, blood clotting issues, dense and cysts breast. [Initial Evaluation] : an initial evaluation [Spouse] : spouse

## 2022-10-27 NOTE — PHYSICAL EXAM
[NI] : Normal [Bra Size: _______] : Bra Size: [unfilled] [de-identified] : visual inspection left breast is larger than right. both breasts are pendulous with evidence of macromastia. on exam no dominant masses appreciated. there is an upper outer quadrant stellate scar in the skin of the left breast. \par SNL: 34 (R), 37 (L)\par BW: 15.5 (R), 17 (R)\par N:IMF:  17.5 (R), 15.5 (L)\par Areolar:  6.5 (R), 6.5 (L)

## 2022-10-27 NOTE — HISTORY OF PRESENT ILLNESS
[FreeTextEntry1] : 70 years old pt is here referred by Dr. Tovar for breast reduction consultation. pt reports current bra size is 40 DDD. pt c/o back pain, neck, shoulder pain, and darken pigmentation of IMF but denies any rash development or tension headaches. pt reports pain level 7/10, takes Tylenol for pain. Patient denies change in cup size over the past 12 months.\par \par b/l MMG from June 2019 was without evidence of malignancy (Birads 2). She later underwent a b/l breast US July 2019 which was also with Birads 2 findings. No cystic or solid lesions were noted at the time of imaging.\par \par Most recent diagnostic mammography is without suspicious findings. Recommended to continue with screening mammography.\par \par pt denies any personal or family Hx of breast cancer.  \par pt denies any cystic or dense breast tissue.\par  pt also denies any s/s of breast pain, nipple discharge, nipple inversion or palpable mass. \par  pt also denies any Hx of blood clotting issues or miscarriages. \par \par

## 2022-12-06 ENCOUNTER — EMERGENCY (EMERGENCY)
Facility: HOSPITAL | Age: 72
LOS: 1 days | Discharge: HOME | End: 2022-12-06
Admitting: EMERGENCY MEDICINE

## 2022-12-06 VITALS
HEART RATE: 98 BPM | DIASTOLIC BLOOD PRESSURE: 81 MMHG | SYSTOLIC BLOOD PRESSURE: 121 MMHG | TEMPERATURE: 99 F | OXYGEN SATURATION: 100 % | RESPIRATION RATE: 18 BRPM

## 2022-12-06 DIAGNOSIS — Z87.81 PERSONAL HISTORY OF (HEALED) TRAUMATIC FRACTURE: Chronic | ICD-10-CM

## 2022-12-06 DIAGNOSIS — Z98.890 OTHER SPECIFIED POSTPROCEDURAL STATES: Chronic | ICD-10-CM

## 2022-12-06 DIAGNOSIS — Z96.659 PRESENCE OF UNSPECIFIED ARTIFICIAL KNEE JOINT: Chronic | ICD-10-CM

## 2022-12-06 PROCEDURE — 99285 EMERGENCY DEPT VISIT HI MDM: CPT

## 2022-12-06 NOTE — ED ADULT TRIAGE NOTE - CHIEF COMPLAINT QUOTE
Pt c/o trip and fall onto right hip on steps to building. Endorses hitting head on wall after already on ground. Denies LOC or AC use. +hematoma to forehead. Denies chest pain, sob, abd pain, headache, blurry vision, n/v/d, fevers/chills. Pmhx: htn, anemia

## 2022-12-07 PROCEDURE — 70450 CT HEAD/BRAIN W/O DYE: CPT | Mod: 26,MA

## 2022-12-07 PROCEDURE — 70486 CT MAXILLOFACIAL W/O DYE: CPT | Mod: 26,MA

## 2022-12-07 PROCEDURE — 73501 X-RAY EXAM HIP UNI 1 VIEW: CPT | Mod: 26,RT

## 2022-12-07 RX ORDER — ACETAMINOPHEN 500 MG
650 TABLET ORAL ONCE
Refills: 0 | Status: COMPLETED | OUTPATIENT
Start: 2022-12-06 | End: 2022-12-06

## 2022-12-07 RX ADMIN — Medication 650 MILLIGRAM(S): at 01:22

## 2022-12-07 NOTE — ED PROVIDER NOTE - NSFOLLOWUPINSTRUCTIONS_ED_ALL_ED_FT
A concussion is a brain injury from a direct hit (blow) to your head or body. This blow causes your brain to shake quickly back and forth inside your skull. This can damage brain cells and cause chemical changes in your brain. Concussions are usually not life-threatening but can cause several serious symptoms.  Post-concussion syndrome is when symptoms that occur after a concussion last longer than normal. These symptoms can last from weeks to months.    Treatment for this condition may depend on your symptoms. Symptoms usually go away on their own over time. Treatments may include: Medicines for headaches. Follow up with primary care for further evaluation

## 2022-12-07 NOTE — ED PROVIDER NOTE - PATIENT PORTAL LINK FT
You can access the FollowMyHealth Patient Portal offered by [facility] by registering at the following website: [email or web address]. By joining "[employer]’s FollowMyHealth portal, you will also be able to view your health information using other applications (apps) compatible with our system.

## 2022-12-07 NOTE — ED PROVIDER NOTE - CLINICAL SUMMARY MEDICAL DECISION MAKING FREE TEXT BOX
73 Y/O PMH of HTN presents to ER following fall  CT r/o bleed. CT max/face r/o fracture. Hip XR  Symptom Management. Re-evaluate

## 2022-12-07 NOTE — ED PROVIDER NOTE - OBJECTIVE STATEMENT
73 Y/O PMH of HTN presents to ER following fall. PT was returning home from [employer] pulling cart up front steps when lost balance landing on my RT hip, Hit and RT side of face hit ground. Admits to pain of hip w/ movement and bearing weight. Taking Tylenol w/ minimal relief. Noted have bump on forehead. Not on aspirin or anticoagulant. Denies fever, chills, nausea/vomiting, dizziness, headache, visual change, neck/back pain.

## 2022-12-07 NOTE — ED PROVIDER NOTE - PHYSICAL EXAMINATION
Vital signs reviewed.   CONSTITUTIONAL: Well-appearing; well-nourished; in no apparent distress. Non-toxic appearing.   HEAD: Normocephalic, atraumatic.  EYES: PERRL, EOM intact, visual fields intact, nasal bridge non-tender. No deformity. Normal conjunctiva and no sclera injection noted  ENT: normal nose; no rhinorrhea.  NECK: FAROM. No midline tenderness  CARD: Normal S1, S2  RESP: Normal chest excursion with respiration; breath sounds clear and equal bilaterally.  ABD/GI: soft, non-distended; non-tender  EXT/MS: RT hip FAROM. Non-tender. Moves all extremities; distal pulses are normal, no pedal edema. Ambulating and bearing weight  SKIN: Normal for age and race; warm; dry; good turgor; no apparent lesions or exudate noted.  NEURO: Awake, alert, oriented x 3, no gross deficits, CN II-XII grossly intact, no motor or sensory deficit noted.  PSYCH: Normal mood; appropriate affect.

## 2022-12-07 NOTE — ED PROVIDER NOTE - NS ED ROS FT
Constitutional: (-) fever   Head: (+) contusion Forehead (+) facial pain  Eyes/ENT: (-) vision changes  Cardiovascular: (-) chest pain, (-) wheezing  Respiratory: (-) cough, (-) shortness of breath  Gastrointestinal: (-) vomiting, (-) diarrhea, (-) abdominal pain  : (-) dysuria   Musculoskeletal: (-) back pain (+) hip pain  Integumentary: (-) rash, (-) edema  Neurological: (-)loc  Allergic/Immunologic: (-) pruritus

## 2022-12-10 NOTE — DISCHARGE NOTE PROVIDER - CARE PROVIDERS DIRECT ADDRESSES
Emergency Department Discharge Information for Kade Braun was seen in the Emergency Department today for concern for COVID-19.    COVID-19 is an infection that is caused by a virus. It can cause fever, cough, sore throat, nasal congestion, loss of taste or smell, headache, body aches, tiredness, vomiting, diarrhea, or a rash. Most children do not need any special medicines to treat COVID-19. Antibiotics do not help.     Most children with COVID-19 have mild symptoms and recover on their own without treatment. It can occasionally be serious in children, and is more often serious in adults, so we recommend doing your best to keep Kade away from other people outside your family while he is sick.     Kade was tested for COVID-19 and the results are not back yet. If the test shows that he has COVID-19, we will call you. You will also be able to see these test results in [facility] if you have that set up for him.    Home care:    Make sure he gets plenty of rest  Make sure he drinks plenty of liquids so he does not get dehydrated  It is OK if he does not want to eat food, as long as he is willing to drink.     For fever or pain, Kade can have:    Acetaminophen (Tylenol) every 4 to 6 hours as needed (up to 5 doses in 24 hours). His dose is: 20 ml (640 mg) of the infant's or children's liquid OR 2 regular strength tabs (650 mg)      (43.2+ kg/96+ lb)     Or    Ibuprofen (Advil, Motrin) every 6 hours as needed. His dose is:  1 tab of the 600 mg prescription tabs                                                                  (60-80 kg/132-176 lb)    If necessary, it is safe to give both Tylenol and ibuprofen, as long as you are careful not to give Tylenol more than every 4 hours or ibuprofen more than every 6 hours.    These doses are based on your child s weight. If you have a prescription for these medicines, the dose may be a little different. Either dose is safe. If you have questions, ask a doctor or pharmacist.  "      Please return to the ED or contact his regular clinic if he:     becomes much more ill  has fevers that last more than 4 days  has chest pain  has severe abdominal (belly) pain  won't drink  can't keep down liquids  goes more than 8 hours without urinating (peeing) or  is much more irritable or sleepier than usual    Call if you have any other concerns.      Please make an appointment to follow up with his regular clinic if you have any concerns about how he is doing.     If you want to set up a [facility] account, you can go to [employer] Click \"Sign Up Now,\" then click \"No activation code? Sign up online\" and follow the directions.               Here is some information on how to protect yourself and people around you from catching COVID-19 while your child is sick:    SELF ISOLATION (precautions for your child and all household members)   Stay home and away from others except when seeking medical care. Do not go to work, school, or public areas. Avoid using public transportation, ride-sharing (Uber/Lyft), or taxis.  As much as possible, your child should stay in a separate room and away from others in your home, even for meals. No hugging, kissing or shaking hands. No visitors.  Your child should use a separate bathroom if available. If not available, clean bathroom surfaces with household  after use.  Elderly people (65yrs and older), people with chronic diseases and those with weakened immune systems who live in the home should stay elsewhere if possible.  Avoid contact with pets and other animals.   Do not share household items. Do not share dishes, drinking glasses, eating utensils, towels, bedding, etc., with others family members or pets in your home. These items should be washed with soap and water.   Clean \"high touch\" surfaces such as doorknobs, counters, tabletops, handle, toilets etc) often. Use household cleaning spray or wipes.   Cover mouth and nose with a tissue when " coughing or sneezing to avoid spreading germs.  Wash hands and face often. Use soap and water.  Avoid touching eyes, nose and mouth with unwashed hands.    When to stop self-isolation/ quarantine:   Your child will need to stay home and away from others (self-isolate) at least until:  Your child has no fever without receiving medicine that reduces fever for 1 day (24 hours)  AND  Your child's other symptoms (cough, sore throat etc) have gotten better.  AND  At least 5 days have passed since symptoms started or the test was done. Some schools or programs may require a longer time away. Check with your child's school about their guidelines for returning.      ,DirectAddress_Unknown,DirectAddress_Unknown

## 2023-01-01 NOTE — DISCHARGE NOTE NURSING/CASE MANAGEMENT/SOCIAL WORK - PATIENT PORTAL LINK FT
You can access the FollowMyHealth Patient Portal offered by [facility] by registering at the following website: [email or web address]. By joining [facility]’s FollowMyHealth portal, you will also be able to view your health information using other applications (apps) compatible with our system.
Statement Selected

## 2023-04-24 ENCOUNTER — INPATIENT (INPATIENT)
Facility: HOSPITAL | Age: 73
LOS: 7 days | Discharge: HOME | End: 2023-05-02
Attending: HOSPITALIST | Admitting: HOSPITALIST
Payer: MEDICARE

## 2023-04-24 VITALS
HEART RATE: 87 BPM | DIASTOLIC BLOOD PRESSURE: 71 MMHG | TEMPERATURE: 99 F | SYSTOLIC BLOOD PRESSURE: 112 MMHG | OXYGEN SATURATION: 100 % | RESPIRATION RATE: 16 BRPM

## 2023-04-24 DIAGNOSIS — Z96.659 PRESENCE OF UNSPECIFIED ARTIFICIAL KNEE JOINT: Chronic | ICD-10-CM

## 2023-04-24 DIAGNOSIS — Z98.890 OTHER SPECIFIED POSTPROCEDURAL STATES: Chronic | ICD-10-CM

## 2023-04-24 DIAGNOSIS — Z87.81 PERSONAL HISTORY OF (HEALED) TRAUMATIC FRACTURE: Chronic | ICD-10-CM

## 2023-04-24 DIAGNOSIS — R19.7 DIARRHEA, UNSPECIFIED: ICD-10-CM

## 2023-04-24 LAB
ALBUMIN SERPL ELPH-MCNC: 4.1 G/DL — SIGNIFICANT CHANGE UP (ref 3.3–5)
ALP SERPL-CCNC: 103 U/L — SIGNIFICANT CHANGE UP (ref 40–120)
ALT FLD-CCNC: 33 U/L — SIGNIFICANT CHANGE UP (ref 4–33)
ANION GAP SERPL CALC-SCNC: 12 MMOL/L — SIGNIFICANT CHANGE UP (ref 7–14)
APTT BLD: 38.7 SEC — HIGH (ref 27–36.3)
AST SERPL-CCNC: 23 U/L — SIGNIFICANT CHANGE UP (ref 4–32)
BASOPHILS # BLD AUTO: 0.02 K/UL — SIGNIFICANT CHANGE UP (ref 0–0.2)
BASOPHILS NFR BLD AUTO: 0.4 % — SIGNIFICANT CHANGE UP (ref 0–2)
BILIRUB SERPL-MCNC: <0.2 MG/DL — SIGNIFICANT CHANGE UP (ref 0.2–1.2)
BLD GP AB SCN SERPL QL: NEGATIVE — SIGNIFICANT CHANGE UP
BUN SERPL-MCNC: 10 MG/DL — SIGNIFICANT CHANGE UP (ref 7–23)
CALCIUM SERPL-MCNC: 10.6 MG/DL — HIGH (ref 8.4–10.5)
CHLORIDE SERPL-SCNC: 103 MMOL/L — SIGNIFICANT CHANGE UP (ref 98–107)
CO2 SERPL-SCNC: 23 MMOL/L — SIGNIFICANT CHANGE UP (ref 22–31)
CREAT SERPL-MCNC: 0.68 MG/DL — SIGNIFICANT CHANGE UP (ref 0.5–1.3)
EGFR: 92 ML/MIN/1.73M2 — SIGNIFICANT CHANGE UP
EOSINOPHIL # BLD AUTO: 0.5 K/UL — SIGNIFICANT CHANGE UP (ref 0–0.5)
EOSINOPHIL NFR BLD AUTO: 8.9 % — HIGH (ref 0–6)
GLUCOSE SERPL-MCNC: 82 MG/DL — SIGNIFICANT CHANGE UP (ref 70–99)
HCT VFR BLD CALC: 34.9 % — SIGNIFICANT CHANGE UP (ref 34.5–45)
HGB BLD-MCNC: 11 G/DL — LOW (ref 11.5–15.5)
IANC: 2.35 K/UL — SIGNIFICANT CHANGE UP (ref 1.8–7.4)
IMM GRANULOCYTES NFR BLD AUTO: 0.4 % — SIGNIFICANT CHANGE UP (ref 0–0.9)
INR BLD: 1.14 RATIO — SIGNIFICANT CHANGE UP (ref 0.88–1.16)
LYMPHOCYTES # BLD AUTO: 1.74 K/UL — SIGNIFICANT CHANGE UP (ref 1–3.3)
LYMPHOCYTES # BLD AUTO: 30.9 % — SIGNIFICANT CHANGE UP (ref 13–44)
MAGNESIUM SERPL-MCNC: 2 MG/DL — SIGNIFICANT CHANGE UP (ref 1.6–2.6)
MCHC RBC-ENTMCNC: 26.4 PG — LOW (ref 27–34)
MCHC RBC-ENTMCNC: 31.5 GM/DL — LOW (ref 32–36)
MCV RBC AUTO: 83.9 FL — SIGNIFICANT CHANGE UP (ref 80–100)
MONOCYTES # BLD AUTO: 1 K/UL — HIGH (ref 0–0.9)
MONOCYTES NFR BLD AUTO: 17.8 % — HIGH (ref 2–14)
NEUTROPHILS # BLD AUTO: 2.35 K/UL — SIGNIFICANT CHANGE UP (ref 1.8–7.4)
NEUTROPHILS NFR BLD AUTO: 41.6 % — LOW (ref 43–77)
NRBC # BLD: 0 /100 WBCS — SIGNIFICANT CHANGE UP (ref 0–0)
NRBC # FLD: 0 K/UL — SIGNIFICANT CHANGE UP (ref 0–0)
PLATELET # BLD AUTO: 426 K/UL — HIGH (ref 150–400)
POTASSIUM SERPL-MCNC: 3.6 MMOL/L — SIGNIFICANT CHANGE UP (ref 3.5–5.3)
POTASSIUM SERPL-SCNC: 3.6 MMOL/L — SIGNIFICANT CHANGE UP (ref 3.5–5.3)
PROT SERPL-MCNC: 7.2 G/DL — SIGNIFICANT CHANGE UP (ref 6–8.3)
PROTHROM AB SERPL-ACNC: 13.2 SEC — SIGNIFICANT CHANGE UP (ref 10.5–13.4)
RBC # BLD: 4.16 M/UL — SIGNIFICANT CHANGE UP (ref 3.8–5.2)
RBC # FLD: 14.2 % — SIGNIFICANT CHANGE UP (ref 10.3–14.5)
RH IG SCN BLD-IMP: POSITIVE — SIGNIFICANT CHANGE UP
SODIUM SERPL-SCNC: 138 MMOL/L — SIGNIFICANT CHANGE UP (ref 135–145)
WBC # BLD: 5.63 K/UL — SIGNIFICANT CHANGE UP (ref 3.8–10.5)
WBC # FLD AUTO: 5.63 K/UL — SIGNIFICANT CHANGE UP (ref 3.8–10.5)

## 2023-04-24 PROCEDURE — 74177 CT ABD & PELVIS W/CONTRAST: CPT | Mod: 26,MA

## 2023-04-24 PROCEDURE — 99223 1ST HOSP IP/OBS HIGH 75: CPT

## 2023-04-24 PROCEDURE — 99285 EMERGENCY DEPT VISIT HI MDM: CPT

## 2023-04-24 RX ORDER — LANOLIN ALCOHOL/MO/W.PET/CERES
3 CREAM (GRAM) TOPICAL AT BEDTIME
Refills: 0 | Status: DISCONTINUED | OUTPATIENT
Start: 2023-04-24 | End: 2023-05-02

## 2023-04-24 RX ORDER — ENOXAPARIN SODIUM 100 MG/ML
40 INJECTION SUBCUTANEOUS EVERY 24 HOURS
Refills: 0 | Status: DISCONTINUED | OUTPATIENT
Start: 2023-04-24 | End: 2023-05-02

## 2023-04-24 RX ORDER — ACETAMINOPHEN 500 MG
650 TABLET ORAL EVERY 6 HOURS
Refills: 0 | Status: DISCONTINUED | OUTPATIENT
Start: 2023-04-24 | End: 2023-04-26

## 2023-04-24 RX ORDER — HYDROCORTISONE 20 MG
60 TABLET ORAL ONCE
Refills: 0 | Status: COMPLETED | OUTPATIENT
Start: 2023-04-24 | End: 2023-04-24

## 2023-04-24 RX ORDER — ONDANSETRON 8 MG/1
4 TABLET, FILM COATED ORAL EVERY 8 HOURS
Refills: 0 | Status: DISCONTINUED | OUTPATIENT
Start: 2023-04-24 | End: 2023-05-02

## 2023-04-24 RX ORDER — SODIUM CHLORIDE 9 MG/ML
1000 INJECTION INTRAMUSCULAR; INTRAVENOUS; SUBCUTANEOUS ONCE
Refills: 0 | Status: COMPLETED | OUTPATIENT
Start: 2023-04-24 | End: 2023-04-24

## 2023-04-24 RX ADMIN — SODIUM CHLORIDE 1000 MILLILITER(S): 9 INJECTION INTRAMUSCULAR; INTRAVENOUS; SUBCUTANEOUS at 14:35

## 2023-04-24 RX ADMIN — Medication 60 MILLIGRAM(S): at 21:48

## 2023-04-24 NOTE — ED PROVIDER NOTE - CLINICAL SUMMARY MEDICAL DECISION MAKING FREE TEXT BOX
Gretel Mejia DO PGY-2  72 year old female with PMH ulcerative colitis on mesalamine, seizures on keppra, HTN presents to the ED with 1 month diarrhea and rectal bleeding feeling consistent with previous UC flares, now associated with lightheadedness. Abdomen nontender, nontoxic appearing, hemodynamically stable. Plan to evaluate extent of colitis with CT, labs to eval for hematologic/electrolyte abnormalities, give fluids, and re-evaluate for dispo.

## 2023-04-24 NOTE — ED ADULT NURSE NOTE - OBJECTIVE STATEMENT
Pt presents to ED ambulatory with steady gait c/o BRBPR x3 weeks. Pt with hx of UC, compliment with GI regimen. States that in the last 2 months she has lost about 30 pounds. States that she had over 10 episodes of bloody stool a day for the last 3 weeks, and has been feeling tired and weak. Pt endorses intermittent nausea and abdominal pain as well. Denies any CP, palpitations, SOB or other complaints. Pt states her Voodoo prevents her from receiving blood products. 20G Yoana placed in RAC. Flushed well with 10cc NS with good blood return. No s/s discomfort or erythema at insertion site. Labs obtained.  remains at bedside. call bell within reach. Education provided to pt on how to and when to use call bell for assistance. Will continue to monitor.

## 2023-04-24 NOTE — H&P ADULT - PROBLEM SELECTOR PLAN 6
DVT ppx - Lovenox   Diet - DASH regular  Activity - OOB to chair/with assistance, ambulate with assistance, increase as tolerated

## 2023-04-24 NOTE — ED ADULT TRIAGE NOTE - CHIEF COMPLAINT QUOTE
PMHX ulcerative colitis, seizures, HTN. C/o rectal bleeding x 3 weeks., increasingly worse past 2 weeks. States now going to bathroom , having diarrhea every 5 minutes.

## 2023-04-24 NOTE — H&P ADULT - NSHPOUTPATIENTPROVIDERS_GEN_ALL_CORE
PCP - Dr. Nakul leonard  GI - Dr. Frank Corbett  Neuro - Dr. Cameron Oliveira PCP - Dr. Nakul leonard  GI - Dr. Frank Velasquez  Neuro - Dr. Cameron Oliveira

## 2023-04-24 NOTE — H&P ADULT - NSHPSOCIALHISTORY_GEN_ALL_CORE
Lives with family  Ambulates with cane/1 person assist  Denies tobacco, EtOH, or illicit substance use

## 2023-04-24 NOTE — H&P ADULT - HISTORY OF PRESENT ILLNESS
This is a 72F with history of US on Mesalamine, Seizure d/o, and HTN who presents to the hospital with complaints of diarrhea with hematochezia for the past ~1 month. States that she has been having frequent diarrhea every day, states that she has been having diarrhea every 5-10 minutes (though no diarrhea since coming to the ED). States that her mesalamine was increased by her GI doctor and she was started on imodium, yogurt, and ginger ale with limited improvement in her symptoms. Also with generalized weakness with reported weight loss of ~30 lbs over the past 1-2 months. Also with complaints of vague chest pain radiating to her L arm at times (non-exertional) and episodes of ?AMS (reports having had an EEG with her neurologist that did not show any acute findings. No current chest pain or AMS. No other acute complaints.     On arrival to the ED, her vitals were T 98.8, P 112/71, RR 16, O2 sat 100% RA. Her lab work showed mild anemia. Her CT A/P showed findings consistent with proctocolitis with reactive LAD.  This is a 72F with history of US on Mesalamine, Seizure d/o, and HTN who presents to the hospital with complaints of diarrhea with hematochezia for the past ~1 month. States that she has been having frequent diarrhea every day, states that she has been having diarrhea every 5-10 minutes (though no diarrhea since coming to the ED). States that her mesalamine was increased by her GI doctor and she was started on imodium, yogurt, and ginger ale with limited improvement in her symptoms. Also with generalized weakness with reported weight loss of ~30 lbs over the past 1-2 months. Also with complaints of vague chest pain radiating to her L arm at times (non-exertional) and episodes of ?AMS (reports having had an EEG with her neurologist that did not show any acute findings. No current chest pain or AMS. No other acute complaints.     On arrival to the ED, her vitals were T 98.8, P 112/71, RR 16, O2 sat 100% RA. Her lab work showed mild anemia. Her CT A/P showed findings consistent with proctocolitis with reactive LAD. In ED she was given NS 1L and solucortef 60mg x1. She was admitted to medicine.

## 2023-04-24 NOTE — ED PROVIDER NOTE - ATTENDING CONTRIBUTION TO CARE
73yo F ho ulcerative colitis, seizures on keppra, htn pw 1 month of diarrhea and rectal bleeding. pt goes frequently throughout the day and has blood every time. denies fevers, chills,   feels slihgtly lighheaded. has been on mesalamine but with no improvement  not on any a/c  abd nontneder  will check labs, ct, gi eval 72year old Female with  hisotry of ulcerative colitis, seizures on keppra, hypertension, presents with 1 month of diarrhea and rectal bleeding. pt goes frequently throughout the day and has blood every time. denies fevers, chills,   feels slihgtly lighheaded. has been on mesalamine but with no improvement and has been seen by her GI in last month.   not on any anti coagulation  abdomen  nontender  will check labs, ct, gi eval, consider admission for Ulcerative Colitis flare vs GI bleed or other acute abdominal pathology 72year old Female with  hisotry of ulcerative colitis, seizures on keppra, hypertension, presents with 1 month of diarrhea and rectal bleeding. pt goes frequently throughout the day and has blood every time. denies fevers, chills,   feels slightly lighheaded. has been on mesalamine but with no improvement and has been seen by her GI in last month.   not on any anti coagulation  abdomen  nontender  will check labs, ct, gi eval, consider admission for Ulcerative Colitis flare vs GI bleed or other acute abdominal pathology

## 2023-04-24 NOTE — H&P ADULT - NSHPPHYSICALEXAM_GEN_ALL_CORE
Vital Signs Last 24 Hrs  T(C): 36.7 (24 Apr 2023 23:52), Max: 37.1 (24 Apr 2023 12:31)  T(F): 98 (24 Apr 2023 23:52), Max: 98.8 (24 Apr 2023 12:31)  HR: 78 (24 Apr 2023 23:52) (73 - 87)  BP: 116/64 (24 Apr 2023 23:52) (112/71 - 119/69)  BP(mean): --  RR: 18 (24 Apr 2023 23:52) (16 - 18)  SpO2: 99% (24 Apr 2023 23:52) (99% - 100%)    Parameters below as of 24 Apr 2023 23:52  Patient On (Oxygen Delivery Method): room air    GENERAL: No acute distress, well-developed  EYES: EOMI, PERRL, conjunctiva and sclera clear  ENT: Neck supple, No JVD, moist mucosa  CHEST/LUNG: Clear to auscultation bilaterally; No wheeze, equal breath sounds bilaterally   BACK: No spinal tenderness  HEART: Regular rate and rhythm; No murmurs, rubs, or gallops  ABDOMEN: Soft, Nontender, Nondistended; Bowel sounds present  EXTREMITIES: +DP/PT/Radial pulses, No clubbing, cyanosis, or edema  PSYCH: Nl behavior, nl affect  NEUROLOGY: AAOx3, non-focal  SKIN: Normal color, No rashes or lesions

## 2023-04-24 NOTE — H&P ADULT - PROBLEM SELECTOR PLAN 2
- Reports vague chest pain with radiation to her L arm but non-exertional, none currently, no LOC/palpitations  - EKG non-ischemic  - Check trops x2, check TTE  - Consider cards eval in AM  - Monitor on telemetry for now

## 2023-04-24 NOTE — H&P ADULT - PROBLEM SELECTOR PLAN 1
- Patient with frequent diarrhea with hematochezia (though none noted on recent BMs) with CT showing proctocolitis with reactive LAD  - Patient unsure if her outpatient GI doctor checked for infectious causes to her UC flare -> would therefore check a GI PCR, CDiff  - Check CRP in AM  - c/w mesalamine  - GI eval in AM

## 2023-04-24 NOTE — H&P ADULT - PROBLEM SELECTOR PLAN 4
- Reports episodes of AMS for 1-2 seconds, has been evaluated by her neurologist with an EEG that did not show acute findings as per patient  - c/w keppra, monitor for episodes of AMS

## 2023-04-24 NOTE — ED ADULT NURSE NOTE - ED STAT RN HANDOFF DETAILS
Pt at baseline mental status, breathing even and unlabored in bed. Pt to be transferred to ESSU to await a bed.

## 2023-04-24 NOTE — H&P ADULT - NSHPREVIEWOFSYSTEMS_GEN_ALL_CORE
REVIEW OF SYSTEMS:    CONSTITUTIONAL: No weakness, fevers or chills  EYES: No visual changes or eye discharge  ENT: No rhinorrhea or sore throat  NECK: No pain or stiffness  RESPIRATORY: No cough, wheezing, hemoptysis; No shortness of breath  CARDIOVASCULAR: No chest pain or palpitations; No lower extremity edema  GASTROINTESTINAL: +Lower abdominal epigastric pain. No nausea, vomiting, or hematemesis; +diarrhea, No constipation. No melena, No hematochezia.  BACK: No back pain, no flank pain  GENITOURINARY: No dysuria, frequency or hematuria  NEUROLOGICAL: No numbness or weakness  SKIN: No itching, burning, rashes, or lesions

## 2023-04-24 NOTE — ED PROVIDER NOTE - PROGRESS NOTE DETAILS
Gretel Mejia, DO PGY-2  Patient re-evaluated, stable. Discussed lab results with patient. Gretel Mejia DO PGY-2  Discussed with patient's personal gastroenterologist Dr. Frank Velasquez [phone]). If CT scan is negative for any other acute pathology and symptoms are likely due to ulcerative colitis flare, recommend admission for IV steroids (solucortef 50 mg bid) and eventual taper, as well as GI consult.

## 2023-04-24 NOTE — H&P ADULT - PROBLEM SELECTOR PLAN 3
- Patient reports ~30lbs weight loss over the past 1-2 months  - Likely 2/2 active UC  - Registered dietician order placed  - Likely further outpatient work up for her weight loss

## 2023-04-24 NOTE — ED PROVIDER NOTE - CONSIDERATION OF ADMISSION OBSERVATION
CT with proctocolitis, consistent with ulcerative colitis flare, after discussion with patients gastroenterologist recommends admission for steroids. will give one dose of steroids and admit, inpatient team to consult in house gastroenterology for further recommendations Consideration of Admission/Observation

## 2023-04-24 NOTE — ED PROVIDER NOTE - OBJECTIVE STATEMENT
72 year old female with PMH ulcerative colitis on mesalamine, seizures on keppra, HTN presents to the ED with 1 month diarrhea and rectal bleeding feeling consistent with previous UC flares, now associated with lightheadedness. Patient was seen by gastroenterologist Dr. Chowdary who continued mesalamine and started immodium without much relief. Denies fever, chills, chest pain, shortness of breath, nausea, vomiting, abdominal pain.     PCP: Nakul Morel 72 year old female with PMH ulcerative colitis on mesalamine, seizures on keppra, HTN presents to the ED with 1 month diarrhea and rectal bleeding feeling consistent with previous Ulcerative Colitis flares, now associated with lightheadedness. Patient was seen by gastroenterologist Dr. Chowdary who continued mesalamine and started immodium without much relief. Denies fever, chills, chest pain, shortness of breath, nausea, vomiting, abdominal pain.     PCP: Nakul Morel

## 2023-04-25 ENCOUNTER — TRANSCRIPTION ENCOUNTER (OUTPATIENT)
Age: 73
End: 2023-04-25

## 2023-04-25 DIAGNOSIS — R63.4 ABNORMAL WEIGHT LOSS: ICD-10-CM

## 2023-04-25 DIAGNOSIS — Z29.9 ENCOUNTER FOR PROPHYLACTIC MEASURES, UNSPECIFIED: ICD-10-CM

## 2023-04-25 DIAGNOSIS — I10 ESSENTIAL (PRIMARY) HYPERTENSION: ICD-10-CM

## 2023-04-25 DIAGNOSIS — R07.9 CHEST PAIN, UNSPECIFIED: ICD-10-CM

## 2023-04-25 DIAGNOSIS — G40.909 EPILEPSY, UNSPECIFIED, NOT INTRACTABLE, WITHOUT STATUS EPILEPTICUS: ICD-10-CM

## 2023-04-25 DIAGNOSIS — K51.90 ULCERATIVE COLITIS, UNSPECIFIED, WITHOUT COMPLICATIONS: ICD-10-CM

## 2023-04-25 LAB
A1C WITH ESTIMATED AVERAGE GLUCOSE RESULT: 5.8 % — HIGH (ref 4–5.6)
ALBUMIN SERPL ELPH-MCNC: 3.6 G/DL — SIGNIFICANT CHANGE UP (ref 3.3–5)
ALP SERPL-CCNC: 94 U/L — SIGNIFICANT CHANGE UP (ref 40–120)
ALT FLD-CCNC: 28 U/L — SIGNIFICANT CHANGE UP (ref 4–33)
ANION GAP SERPL CALC-SCNC: 14 MMOL/L — SIGNIFICANT CHANGE UP (ref 7–14)
AST SERPL-CCNC: 21 U/L — SIGNIFICANT CHANGE UP (ref 4–32)
BASOPHILS # BLD AUTO: 0.02 K/UL — SIGNIFICANT CHANGE UP (ref 0–0.2)
BASOPHILS NFR BLD AUTO: 0.4 % — SIGNIFICANT CHANGE UP (ref 0–2)
BILIRUB SERPL-MCNC: <0.2 MG/DL — SIGNIFICANT CHANGE UP (ref 0.2–1.2)
BUN SERPL-MCNC: 7 MG/DL — SIGNIFICANT CHANGE UP (ref 7–23)
CALCIUM SERPL-MCNC: 10 MG/DL — SIGNIFICANT CHANGE UP (ref 8.4–10.5)
CHLORIDE SERPL-SCNC: 104 MMOL/L — SIGNIFICANT CHANGE UP (ref 98–107)
CHOLEST SERPL-MCNC: 143 MG/DL — SIGNIFICANT CHANGE UP
CO2 SERPL-SCNC: 20 MMOL/L — LOW (ref 22–31)
CREAT SERPL-MCNC: 0.62 MG/DL — SIGNIFICANT CHANGE UP (ref 0.5–1.3)
CRP SERPL-MCNC: 37.2 MG/L — HIGH
EGFR: 94 ML/MIN/1.73M2 — SIGNIFICANT CHANGE UP
EOSINOPHIL # BLD AUTO: 0.01 K/UL — SIGNIFICANT CHANGE UP (ref 0–0.5)
EOSINOPHIL NFR BLD AUTO: 0.2 % — SIGNIFICANT CHANGE UP (ref 0–6)
ESTIMATED AVERAGE GLUCOSE: 120 — SIGNIFICANT CHANGE UP
GLUCOSE SERPL-MCNC: 90 MG/DL — SIGNIFICANT CHANGE UP (ref 70–99)
HCT VFR BLD CALC: 31.3 % — LOW (ref 34.5–45)
HDLC SERPL-MCNC: 51 MG/DL — SIGNIFICANT CHANGE UP
HGB BLD-MCNC: 10 G/DL — LOW (ref 11.5–15.5)
IANC: 3.72 K/UL — SIGNIFICANT CHANGE UP (ref 1.8–7.4)
IMM GRANULOCYTES NFR BLD AUTO: 0.2 % — SIGNIFICANT CHANGE UP (ref 0–0.9)
LIPID PNL WITH DIRECT LDL SERPL: 83 MG/DL — SIGNIFICANT CHANGE UP
LYMPHOCYTES # BLD AUTO: 0.94 K/UL — LOW (ref 1–3.3)
LYMPHOCYTES # BLD AUTO: 18.1 % — SIGNIFICANT CHANGE UP (ref 13–44)
MAGNESIUM SERPL-MCNC: 2 MG/DL — SIGNIFICANT CHANGE UP (ref 1.6–2.6)
MCHC RBC-ENTMCNC: 27 PG — SIGNIFICANT CHANGE UP (ref 27–34)
MCHC RBC-ENTMCNC: 31.9 GM/DL — LOW (ref 32–36)
MCV RBC AUTO: 84.4 FL — SIGNIFICANT CHANGE UP (ref 80–100)
MONOCYTES # BLD AUTO: 0.5 K/UL — SIGNIFICANT CHANGE UP (ref 0–0.9)
MONOCYTES NFR BLD AUTO: 9.6 % — SIGNIFICANT CHANGE UP (ref 2–14)
NEUTROPHILS # BLD AUTO: 3.72 K/UL — SIGNIFICANT CHANGE UP (ref 1.8–7.4)
NEUTROPHILS NFR BLD AUTO: 71.5 % — SIGNIFICANT CHANGE UP (ref 43–77)
NON HDL CHOLESTEROL: 92 MG/DL — SIGNIFICANT CHANGE UP
NRBC # BLD: 0 /100 WBCS — SIGNIFICANT CHANGE UP (ref 0–0)
NRBC # FLD: 0 K/UL — SIGNIFICANT CHANGE UP (ref 0–0)
PHOSPHATE SERPL-MCNC: 3.3 MG/DL — SIGNIFICANT CHANGE UP (ref 2.5–4.5)
PLATELET # BLD AUTO: 406 K/UL — HIGH (ref 150–400)
POTASSIUM SERPL-MCNC: 4.5 MMOL/L — SIGNIFICANT CHANGE UP (ref 3.5–5.3)
POTASSIUM SERPL-SCNC: 4.5 MMOL/L — SIGNIFICANT CHANGE UP (ref 3.5–5.3)
PROT SERPL-MCNC: 6.6 G/DL — SIGNIFICANT CHANGE UP (ref 6–8.3)
RBC # BLD: 3.71 M/UL — LOW (ref 3.8–5.2)
RBC # FLD: 14.6 % — HIGH (ref 10.3–14.5)
SODIUM SERPL-SCNC: 138 MMOL/L — SIGNIFICANT CHANGE UP (ref 135–145)
TRIGL SERPL-MCNC: 46 MG/DL — SIGNIFICANT CHANGE UP
TROPONIN T, HIGH SENSITIVITY RESULT: 11 NG/L — SIGNIFICANT CHANGE UP
TROPONIN T, HIGH SENSITIVITY RESULT: 9 NG/L — SIGNIFICANT CHANGE UP
TSH SERPL-MCNC: 0.39 UIU/ML — SIGNIFICANT CHANGE UP (ref 0.27–4.2)
WBC # BLD: 5.2 K/UL — SIGNIFICANT CHANGE UP (ref 3.8–10.5)
WBC # FLD AUTO: 5.2 K/UL — SIGNIFICANT CHANGE UP (ref 3.8–10.5)

## 2023-04-25 PROCEDURE — 99221 1ST HOSP IP/OBS SF/LOW 40: CPT | Mod: GC

## 2023-04-25 PROCEDURE — 93306 TTE W/DOPPLER COMPLETE: CPT | Mod: 26

## 2023-04-25 PROCEDURE — 99233 SBSQ HOSP IP/OBS HIGH 50: CPT

## 2023-04-25 RX ORDER — CHOLECALCIFEROL (VITAMIN D3) 125 MCG
1 CAPSULE ORAL
Refills: 0 | DISCHARGE

## 2023-04-25 RX ORDER — SENNA PLUS 8.6 MG/1
2 TABLET ORAL AT BEDTIME
Refills: 0 | Status: DISCONTINUED | OUTPATIENT
Start: 2023-04-25 | End: 2023-04-25

## 2023-04-25 RX ORDER — MORPHINE SULFATE 50 MG/1
2 CAPSULE, EXTENDED RELEASE ORAL EVERY 4 HOURS
Refills: 0 | Status: DISCONTINUED | OUTPATIENT
Start: 2023-04-25 | End: 2023-04-25

## 2023-04-25 RX ORDER — OMEPRAZOLE 10 MG/1
1 CAPSULE, DELAYED RELEASE ORAL
Qty: 0 | Refills: 0 | DISCHARGE

## 2023-04-25 RX ORDER — AMLODIPINE BESYLATE 2.5 MG/1
1 TABLET ORAL
Refills: 0 | DISCHARGE

## 2023-04-25 RX ORDER — ASPIRIN/CALCIUM CARB/MAGNESIUM 324 MG
81 TABLET ORAL DAILY
Refills: 0 | Status: DISCONTINUED | OUTPATIENT
Start: 2023-04-25 | End: 2023-05-02

## 2023-04-25 RX ORDER — SIMVASTATIN 20 MG/1
20 TABLET, FILM COATED ORAL AT BEDTIME
Refills: 0 | Status: DISCONTINUED | OUTPATIENT
Start: 2023-04-25 | End: 2023-05-02

## 2023-04-25 RX ORDER — AMLODIPINE BESYLATE 2.5 MG/1
2.5 TABLET ORAL DAILY
Refills: 0 | Status: DISCONTINUED | OUTPATIENT
Start: 2023-04-25 | End: 2023-05-02

## 2023-04-25 RX ORDER — POLYETHYLENE GLYCOL 3350 17 G/17G
17 POWDER, FOR SOLUTION ORAL ONCE
Refills: 0 | Status: COMPLETED | OUTPATIENT
Start: 2023-04-25 | End: 2023-04-25

## 2023-04-25 RX ORDER — OXYCODONE HYDROCHLORIDE 5 MG/1
2.5 TABLET ORAL EVERY 6 HOURS
Refills: 0 | Status: DISCONTINUED | OUTPATIENT
Start: 2023-04-25 | End: 2023-04-26

## 2023-04-25 RX ORDER — MESALAMINE 400 MG
2400 TABLET, DELAYED RELEASE (ENTERIC COATED) ORAL DAILY
Refills: 0 | Status: DISCONTINUED | OUTPATIENT
Start: 2023-04-25 | End: 2023-05-02

## 2023-04-25 RX ORDER — GEMFIBROZIL 600 MG
1 TABLET ORAL
Qty: 0 | Refills: 0 | DISCHARGE

## 2023-04-25 RX ORDER — AMLODIPINE BESYLATE 2.5 MG/1
1 TABLET ORAL
Qty: 0 | Refills: 0 | DISCHARGE

## 2023-04-25 RX ORDER — SIMVASTATIN 20 MG/1
1 TABLET, FILM COATED ORAL
Refills: 0 | DISCHARGE

## 2023-04-25 RX ORDER — ALENDRONATE SODIUM 70 MG/1
1 TABLET ORAL
Qty: 0 | Refills: 0 | DISCHARGE

## 2023-04-25 RX ORDER — LEVETIRACETAM 250 MG/1
500 TABLET, FILM COATED ORAL
Refills: 0 | Status: DISCONTINUED | OUTPATIENT
Start: 2023-04-25 | End: 2023-05-02

## 2023-04-25 RX ORDER — MESALAMINE 400 MG
2 TABLET, DELAYED RELEASE (ENTERIC COATED) ORAL
Qty: 0 | Refills: 0 | DISCHARGE

## 2023-04-25 RX ORDER — CHOLECALCIFEROL (VITAMIN D3) 125 MCG
1 CAPSULE ORAL
Qty: 0 | Refills: 0 | DISCHARGE

## 2023-04-25 RX ADMIN — MORPHINE SULFATE 2 MILLIGRAM(S): 50 CAPSULE, EXTENDED RELEASE ORAL at 10:20

## 2023-04-25 RX ADMIN — LEVETIRACETAM 500 MILLIGRAM(S): 250 TABLET, FILM COATED ORAL at 18:07

## 2023-04-25 RX ADMIN — AMLODIPINE BESYLATE 2.5 MILLIGRAM(S): 2.5 TABLET ORAL at 06:00

## 2023-04-25 RX ADMIN — SIMVASTATIN 20 MILLIGRAM(S): 20 TABLET, FILM COATED ORAL at 22:02

## 2023-04-25 RX ADMIN — ENOXAPARIN SODIUM 40 MILLIGRAM(S): 100 INJECTION SUBCUTANEOUS at 10:46

## 2023-04-25 RX ADMIN — MORPHINE SULFATE 2 MILLIGRAM(S): 50 CAPSULE, EXTENDED RELEASE ORAL at 10:09

## 2023-04-25 RX ADMIN — LEVETIRACETAM 500 MILLIGRAM(S): 250 TABLET, FILM COATED ORAL at 04:33

## 2023-04-25 RX ADMIN — POLYETHYLENE GLYCOL 3350 17 GRAM(S): 17 POWDER, FOR SOLUTION ORAL at 22:25

## 2023-04-25 RX ADMIN — Medication 81 MILLIGRAM(S): at 10:46

## 2023-04-25 RX ADMIN — Medication 2400 MILLIGRAM(S): at 10:46

## 2023-04-25 NOTE — CONSULT NOTE ADULT - ASSESSMENT
Impressions:     #Hematochezia  #Diarrhea  #Ulcerative colitis  Dx > 10 yrs ago, currently on Mesalamine TID, unknown dose, previously tried sulfasalazine, responded well. Never been on biologic treatment. Has responded to steroids in the past. Used to follow with Dr. Cole, her last colonoscopy in 2016, colitis from the transverse colon to the rectum. Path positive for active colitis. Now transitioned care to Dr. Susana Corbett in [facility]. Currently, developed diarrhea one month ago, progressively worse w/ blood, has fecal urgency.   - CT A/P showed colitis from the TC to the rectum.   - Elevated CRP 37.   - Differentials likely either infectious diarrhea vs. UC flare.     Recommendations:   - Please obtain stools studies GI PCR and c. diff.   - Can consider flex sig tomorrow.   - Keep the patient on CLD today.   - will order enemas tomorrow morning.   - Make her NPO after midnight.   - Hold off on steroids till the infectious work up is negative.   - Please obtain hep B surface Ag and Ab and Quantiferon TB testing.   - Place them on DVT PPx as IBD patients are high risk for thrombosis.   - ESR and CRP daily.     GI will continue to follow.     All recommendations are tentative until note is attested by an attending.     Vanita Whitfield, PGY-4  Gastroenterology/Hepatology Fellow  Available on Microsoft Teams  [phone] (Short Range Pager)  [phone] (Long Range Pager)    After 5pm, please contact the on-call GI fellow. [phone]

## 2023-04-25 NOTE — PATIENT PROFILE ADULT - FALL HARM RISK - HARM RISK INTERVENTIONS

## 2023-04-25 NOTE — DISCHARGE NOTE PROVIDER - CARE PROVIDER_API CALL
MARIBEL YOUNG [place]  Internal Medicine  [address] [place] [address]  Phone: [phone]  Fax: [phone]  Follow Up Time: 1 week

## 2023-04-25 NOTE — CONSULT NOTE ADULT - ATTENDING COMMENTS
History/PE as above. Patient seen/examined. Presents to the ER with several weeks of lower abdominal pain, left lower quadrant cramping and bloody diarrhea. CT consistent with colitis. Evaluate for flare ulcerative colitis versus infectious etiology of symptoms.  Recommend:  -Clear liquid diet today in preparation for flexible sigmoidoscopy on 4/26.  -Stool studies: Stool culture, Clostridium difficile PCR, GI PCR and fecal calprotectin.  -HBV serology, QTF  -DVT prophylaxis  -Serial CBC, BMP and CRP  -DC NSAIDs  -IV hydration  -Avoid opiate analgesics  -Further treatment options pending above diagnostic assessment.

## 2023-04-25 NOTE — DISCHARGE NOTE PROVIDER - NSDCMRMEDTOKEN_GEN_ALL_CORE_FT
alendronate 70 mg oral tablet: 1 tab(s) orally once a week on Sundays  aspirin 81 mg oral delayed release tablet: 1 tab(s) orally once a day  Calcium 600+D oral tablet: 1 tab(s) orally once a day  levETIRAcetam 500 mg oral tablet: 1 tab(s) orally 2 times a day  meloxicam 15 mg oral tablet: 1 tab(s) orally once a day  mesalamine 1.2 g oral delayed release tablet: 2 tab(s) orally once a day  Multiple Vitamins oral tablet: 1 tab(s) orally once a day  Norvasc 2.5 mg oral tablet: 1 tab(s) orally once a day  simvastatin 20 mg oral tablet: 1 tab(s) orally once a day (at bedtime)  Vitamin D3 50 mcg (2000 intl units) oral tablet, chewable: 1 tab(s) orally once a day   alendronate 70 mg oral tablet: 1 tab(s) orally once a week on Sundays  aspirin 81 mg oral delayed release tablet: 1 tab(s) orally once a day  Calcium 600+D oral tablet: 1 tab(s) orally once a day  levETIRAcetam 500 mg oral tablet: 1 tab(s) orally 2 times a day  mesalamine 1.2 g oral delayed release tablet: 2 tab(s) orally once a day  Multiple Vitamins oral tablet: 1 tab(s) orally once a day  Norvasc 2.5 mg oral tablet: 1 tab(s) orally once a day  simvastatin 20 mg oral tablet: 1 tab(s) orally once a day (at bedtime)  vancomycin 25 mg/mL oral liquid: 5 milliliter(s) orally every 6 hours  Vitamin D3 50 mcg (2000 intl units) oral tablet, chewable: 1 tab(s) orally once a day

## 2023-04-25 NOTE — PROGRESS NOTE ADULT - ASSESSMENT
72F with hx of UC (on mesalamine), Seizure, HTN, HLD who presents with diarrhea and heamtochezia, c/f UC flare.

## 2023-04-25 NOTE — CONSULT NOTE ADULT - SUBJECTIVE AND OBJECTIVE BOX
HPI:    Ms. Philip is a 72 yrs old female w/ UC s/p mesalamine and sulfasalazine who presented w/ hematochezia and diarrhea. Pt. reported she was dx w/ UC more than 10 yrs ago, and was initially placed on sulfasalazine for which she responded well, but was transitioned to mesalamine in 2016, responded well. She had her last colonoscopy in 2016 which showed left sided colitis from transverse colon to rectum. She was doing well until one month ago, when she started having loose stools, about 1-2 per day. Prior to that, she had constipation with a  bowel movement every 2-3 days. The loose stools got progressively worse, now also developed bloody stools, has more frequent stools, every 10 minutes for the past 2 weeks. She also complained of lower abd cramping, no nausea, vomiting, fevers or chills. Never tried any biologics in the past. Only takes ASA daily, denied AC use, intermittent NSAID use. On admission, VSS, labs showed hgb 10, CT A/P showed colitis from the transverse colon to the rectum. GI consulted for possible UC flare.     Allergies:  No Known Allergies    Home Medications:  · 	aspirin 81 mg oral delayed release tablet: Last Dose Taken:  , 1 tab(s) orally once a day  · 	levETIRAcetam 500 mg oral tablet: Last Dose Taken:  , 1 tab(s) orally 2 times a day  · 	Norvasc 2.5 mg oral tablet: Last Dose Taken:  , 1 tab(s) orally once a day  · 	simvastatin 20 mg oral tablet: Last Dose Taken:  , 1 tab(s) orally once a day (at bedtime)  · 	mesalamine 1.2 g oral delayed release tablet: Last Dose Taken:  , 2 tab(s) orally once a day  · 	Vitamin D3 50 mcg (2000 intl units) oral tablet, chewable: Last Dose Taken:  , 1 tab(s) orally once a day  · 	Calcium 600+D oral tablet: Last Dose Taken:  , 1 tab(s) orally once a day  · 	Multiple Vitamins oral tablet: Last Dose Taken:  , 1 tab(s) orally once a day  · 	alendronate 70 mg oral tablet: Last Dose Taken:  , 1 tab(s) orally once a week on Sundays  · 	meloxicam 15 mg oral tablet: Last Dose Taken:  , 1 tab(s) orally once a day    Hospital Medications:  acetaminophen     Tablet .. 650 milliGRAM(s) Oral every 6 hours PRN  aluminum hydroxide/magnesium hydroxide/simethicone Suspension 30 milliLiter(s) Oral every 4 hours PRN  amLODIPine   Tablet 2.5 milliGRAM(s) Oral daily  aspirin enteric coated 81 milliGRAM(s) Oral daily  enoxaparin Injectable 40 milliGRAM(s) SubCutaneous every 24 hours  levETIRAcetam 500 milliGRAM(s) Oral two times a day  melatonin 3 milliGRAM(s) Oral at bedtime PRN  mesalamine DR Capsule 2400 milliGRAM(s) Oral daily  morphine  - Injectable 2 milliGRAM(s) IV Push every 4 hours PRN  ondansetron Injectable 4 milliGRAM(s) IV Push every 8 hours PRN  simvastatin 20 milliGRAM(s) Oral at bedtime      PMHX/PSHX:  Colitis    Hypertension    H/O finger fracture    Chronic GERD    OA (osteoarthritis)    Osteoporosis    Ulcerative colitis    Anemia    No significant past surgical history    H/O finger fracture    H/O myomectomy    S/P knee replacement    Family history:  hypertension    Social History:   Tob: Denies  EtOH: Denies  Illicit Drugs: Denies    ROS:     General:  No wt loss, fevers, chills, night sweats, fatigue  Eyes:  Good vision, no reported pain  ENT:  No sore throat, pain, runny nose, dysphagia  CV:  No pain, palpitations, hypo/hypertension  Pulm:  No dyspnea, cough, tachypnea, wheezing  GI:  see HPI  :  No pain, bleeding, incontinence, nocturia  Muscle:  No pain, weakness  Neuro:  No weakness, tingling, memory problems  Psych:  No fatigue, insomnia, mood problems, depression  Endocrine:  No polyuria, polydipsia, cold/heat intolerance  Heme:  No petechiae, ecchymosis, easy bruisability  Skin:  No rash, tattoos, scars, edema    PHYSICAL EXAM:     GENERAL:  No acute distress, lying in bed.   HEENT:  NCAT, no scleral icterus   CHEST:  no respiratory distress  HEART:  Regular rate and rhythm  ABDOMEN:  Soft, mild tenderness in the lower abdomen w/ no guarding and rebound tenderness, non distended, no masses  EXTREMITIES: No LE edema  SKIN:  No rash/erythema/ecchymoses/petechiae/wounds/abscess/warm/dry  NEURO:  Alert and oriented x 3, no tremors.     Vital Signs:  Vital Signs Last 24 Hrs  T(C): 36.4 (25 Apr 2023 10:10), Max: 37 (25 Apr 2023 04:50)  T(F): 97.6 (25 Apr 2023 10:10), Max: 98.6 (25 Apr 2023 04:50)  HR: 76 (25 Apr 2023 10:10) (71 - 84)  BP: 122/83 (25 Apr 2023 10:10) (112/75 - 126/78)  BP(mean): --  RR: 19 (25 Apr 2023 10:10) (16 - 19)  SpO2: 100% (25 Apr 2023 10:10) (99% - 100%)    Parameters below as of 25 Apr 2023 10:10  Patient On (Oxygen Delivery Method): room air      Daily Height in cm: 162.56 (25 Apr 2023 09:13)    Daily     LABS:                        10.0   5.20  )-----------( 406      ( 25 Apr 2023 06:29 )             31.3     Mean Cell Volume: 84.4 fL (04-25-23 @ 06:29)    04-25    138  |  104  |  7   ----------------------------<  90  4.5   |  20<L>  |  0.62    Ca    10.0      25 Apr 2023 06:29  Phos  3.3     04-25  Mg     2.00     04-25    TPro  6.6  /  Alb  3.6  /  TBili  <0.2  /  DBili  x   /  AST  21  /  ALT  28  /  AlkPhos  94  04-25    LIVER FUNCTIONS - ( 25 Apr 2023 06:29 )  Alb: 3.6 g/dL / Pro: 6.6 g/dL / ALK PHOS: 94 U/L / ALT: 28 U/L / AST: 21 U/L / GGT: x           PT/INR - ( 24 Apr 2023 14:30 )   PT: 13.2 sec;   INR: 1.14 ratio         PTT - ( 24 Apr 2023 14:30 )  PTT:38.7 sec                            10.0   5.20  )-----------( 406      ( 25 Apr 2023 06:29 )             31.3                         11.0   5.63  )-----------( 426      ( 24 Apr 2023 14:30 )             34.9       Imaging:    CT A/P    FINDINGS:  LOWER CHEST: Bibasilar atelectasis. Right Bochdalek hernia.    LIVER: Within normal limits.  BILE DUCTS: Normal caliber.  GALLBLADDER: Within normal limits.  SPLEEN: Within normal limits.  PANCREAS: Within normal limits.  ADRENALS: Within normal limits.  KIDNEYS/URETERS: Within normal limits.    BLADDER: Within normal limits.  REPRODUCTIVE ORGANS: Fibroid uterus. No suspicious adnexal mass.    BOWEL: No bowel obstruction. Appendix is normal. Long segment colonic   wall thickening, mucosal hyperenhancement and pericolonic stranding   involving the distal transverse colon to the rectum.  PERITONEUM: No ascites.  VESSELS: Atherosclerotic changes.  RETROPERITONEUM/LYMPH NODES: Mild retroperitoneal and mesenteric   lymphadenopathy.  ABDOMINAL WALL: Small periumbilical and umbilical hernias containing   nonobstructed small bowel.  BONES: Degenerative changes.    IMPRESSION:  Long segment of acute proctocolitis from the distal transverse colon to   the rectum, likely active inflammatory bowel disease in this patient with   history of ulcerative colitis.    Mild mesenteric and retroperitoneal lymphadenopathy, which is likely   reactive.

## 2023-04-25 NOTE — DISCHARGE NOTE PROVIDER - HOSPITAL COURSE
72F with history of UC on Mesalamine, Seizure d/o, and HTN who presents to the hospital with complaints of diarrhea with hematochezia for the past ~1 month, admitted with UC flare. CTAP showed Long segment of acute proctocolitis from the distal transverse colon to the rectum, likely active inflammatory bowel disease in this patient with history of ulcerative colitis. GI was consulted who recommended _______. 72F with history of UC on Mesalamine, Seizure d/o, and HTN who presents to the hospital with complaints of diarrhea with hematochezia for the past ~1 month, admitted with UC flare. CTAP showed Long segment of acute proctocolitis from the distal transverse colon to the rectum, likely active inflammatory bowel disease in this patient with history of ulcerative colitis. GI was consulted who did a flex sig which showed findings consistent with UC. Infectious workup was sent which was ______. Patient was started on ______. 73F Ulcerative Colitis, SZ D/O, HTN, p/w UC flare w/ hematochezia c/b C. diff colitis. 73F Ulcerative Colitis, SZ D/O, HTN, p/w UC flare w/ hematochezia c/b C. diff colitis.       Problem/Plan - 1:  ·  Problem: C. difficile colitis.   ·  Plan: C diff PCR positive on 4/27  improving with oral vanco  c/p PO vancomycin 125 q6h through 5/7  - anticipate discharge to home in next 24 hours.     Problem/Plan - 2:  ·  Problem: Acute ulcerative colitis.   ·  Plan: - Follows OP with GI, Dr. Susana Corbett on [facility].  - s/p flex sig 4/26: diffuse moderate mucosal changes were found in the rectum secondary to left-sided ulcerative colitis (biopsied). Hematochezia and diarrhea likely due to ulcerative colitis  - appreciate GI recs s/p 2 doses of IV methylprednisolone - held after C. diff colitis diagnosed.   - pain control: PRN Tylenol, avoid NSAIDs/opiates per GI  - GI holding off steroid while being treated for C. diff colitis.     Problem/Plan - 3:  ·  Problem: Chest pain.   ·  Plan: RESOLVED  - EKG non-ischemic, trops negative   - TTE with nl LV and RV function   - no events on telemetry, will discontinue.     Problem/Plan - 4:  ·  Problem: Abnormal weight loss.   ·  Plan: reports ~30lbs weight loss over the past 1-2 months  - likely 2/2 active UC  - registered dietician order placed  - further outpatient work up for her weight loss.     Problem/Plan - 5:  ·  Problem: Seizure disorder.   ·  Plan: reports episodes of AMS for 1-2 seconds, has been evaluated by her neurologist with an EEG that did not show acute findings as per patient  - neuro recs appreciated - c/w Keppra.     Problem/Plan - 6:  ·  Problem: Essential hypertension.   ·  Plan: BP stable  - c/w amlodipine  - monitor vital signs.     Problem/Plan - 7:  ·  Problem: Need for prophylactic measure.   ·  Plan: DVT ppx: lovenox  DIET: Low residue diet  DISPO: PT eval -> OP PT

## 2023-04-25 NOTE — DISCHARGE NOTE PROVIDER - NSDCCPCAREPLAN_GEN_ALL_CORE_FT
PRINCIPAL DISCHARGE DIAGNOSIS  Diagnosis: Acute ulcerative colitis  Assessment and Plan of Treatment: You came in with flare of your ulcerative colitis. Please follow up with gastroenterology.     PRINCIPAL DISCHARGE DIAGNOSIS  Diagnosis: Acute ulcerative colitis  Assessment and Plan of Treatment: You came in with flare of your ulcerative colitis. Please follow up with gastroenterology.      SECONDARY DISCHARGE DIAGNOSES  Diagnosis: C. difficile colitis  Assessment and Plan of Treatment: You were diagnosed and treated for C. difficile colitis. Please complete the 14 day course of the Vancomycin.     PRINCIPAL DISCHARGE DIAGNOSIS  Diagnosis: Acute ulcerative colitis  Assessment and Plan of Treatment: You came in with flare of your ulcerative colitis. Please follow up with gastroenterology within 2 weeks      SECONDARY DISCHARGE DIAGNOSES  Diagnosis: C. difficile colitis  Assessment and Plan of Treatment: You were diagnosed and treated for C. difficile colitis. Please complete the 14 day course of the Vancomycin.

## 2023-04-25 NOTE — ED ADULT NURSE REASSESSMENT NOTE - NS ED NURSE REASSESS COMMENT FT1
Pt at baseline mental status, breathing even and unlabored in bed. Pt denies chest pain, SOB, dizziness, headache, blurry vision, chills. Bed in lowest position, family at bedside.
Report received from day shift RN, pt at baseline mental status, breathing even and unlabored in bed. Pt denies chest pain, SOB, dizziness, headache, blurry vision, chills. Bed in lowest position, family at bedside.
Pt at baseline mental status, breathing even and unlabored in bed. Pt denies chest pain, SOB, dizziness, headache, blurry vision, chills. Bed in lowest position.

## 2023-04-25 NOTE — DISCHARGE NOTE PROVIDER - NSDCFUADDAPPT_GEN_ALL_CORE_FT
Please follow up with outpatient GI within 2 weeks, you will need close follow up to decide if you need to be started on biologic therapy.

## 2023-04-25 NOTE — PROGRESS NOTE ADULT - PROBLEM SELECTOR PLAN 1
pw with diarrhea with hematochezia (though none noted on recent BMs)  - CT showing proctocolitis with reactive LAD  - check GI PCR, fecal calprotectin, CRP  - c/w mesalamine for now pending GI recs  - GI consulted f/u recommendations  - pain control: PRN morphine for severe pain, re-eval to transition to PO when able

## 2023-04-26 LAB
ANION GAP SERPL CALC-SCNC: 14 MMOL/L — SIGNIFICANT CHANGE UP (ref 7–14)
BUN SERPL-MCNC: 4 MG/DL — LOW (ref 7–23)
CALCIUM SERPL-MCNC: 10.2 MG/DL — SIGNIFICANT CHANGE UP (ref 8.4–10.5)
CHLORIDE SERPL-SCNC: 104 MMOL/L — SIGNIFICANT CHANGE UP (ref 98–107)
CO2 SERPL-SCNC: 21 MMOL/L — LOW (ref 22–31)
CREAT SERPL-MCNC: 0.66 MG/DL — SIGNIFICANT CHANGE UP (ref 0.5–1.3)
CRP SERPL-MCNC: 27.2 MG/L — HIGH
EGFR: 92 ML/MIN/1.73M2 — SIGNIFICANT CHANGE UP
ERYTHROCYTE [SEDIMENTATION RATE] IN BLOOD: 43 MM/HR — HIGH (ref 4–25)
GLUCOSE SERPL-MCNC: 88 MG/DL — SIGNIFICANT CHANGE UP (ref 70–99)
HBV SURFACE AB SER-ACNC: REACTIVE
HBV SURFACE AG SER-ACNC: SIGNIFICANT CHANGE UP
HCT VFR BLD CALC: 33.7 % — LOW (ref 34.5–45)
HGB BLD-MCNC: 10.8 G/DL — LOW (ref 11.5–15.5)
MCHC RBC-ENTMCNC: 27.2 PG — SIGNIFICANT CHANGE UP (ref 27–34)
MCHC RBC-ENTMCNC: 32 GM/DL — SIGNIFICANT CHANGE UP (ref 32–36)
MCV RBC AUTO: 84.9 FL — SIGNIFICANT CHANGE UP (ref 80–100)
NRBC # BLD: 0 /100 WBCS — SIGNIFICANT CHANGE UP (ref 0–0)
NRBC # FLD: 0 K/UL — SIGNIFICANT CHANGE UP (ref 0–0)
PLATELET # BLD AUTO: 406 K/UL — HIGH (ref 150–400)
POTASSIUM SERPL-MCNC: 3.7 MMOL/L — SIGNIFICANT CHANGE UP (ref 3.5–5.3)
POTASSIUM SERPL-SCNC: 3.7 MMOL/L — SIGNIFICANT CHANGE UP (ref 3.5–5.3)
RBC # BLD: 3.97 M/UL — SIGNIFICANT CHANGE UP (ref 3.8–5.2)
RBC # FLD: 14.6 % — HIGH (ref 10.3–14.5)
SODIUM SERPL-SCNC: 139 MMOL/L — SIGNIFICANT CHANGE UP (ref 135–145)
WBC # BLD: 4.54 K/UL — SIGNIFICANT CHANGE UP (ref 3.8–10.5)
WBC # FLD AUTO: 4.54 K/UL — SIGNIFICANT CHANGE UP (ref 3.8–10.5)

## 2023-04-26 PROCEDURE — 99232 SBSQ HOSP IP/OBS MODERATE 35: CPT

## 2023-04-26 PROCEDURE — 88305 TISSUE EXAM BY PATHOLOGIST: CPT | Mod: 26

## 2023-04-26 PROCEDURE — 45331 SIGMOIDOSCOPY AND BIOPSY: CPT | Mod: GC

## 2023-04-26 RX ORDER — ACETAMINOPHEN 500 MG
650 TABLET ORAL EVERY 6 HOURS
Refills: 0 | Status: DISCONTINUED | OUTPATIENT
Start: 2023-04-26 | End: 2023-05-02

## 2023-04-26 RX ADMIN — ENOXAPARIN SODIUM 40 MILLIGRAM(S): 100 INJECTION SUBCUTANEOUS at 13:15

## 2023-04-26 RX ADMIN — Medication 2400 MILLIGRAM(S): at 21:29

## 2023-04-26 RX ADMIN — Medication 81 MILLIGRAM(S): at 13:14

## 2023-04-26 RX ADMIN — Medication 30 MILLILITER(S): at 23:10

## 2023-04-26 RX ADMIN — LEVETIRACETAM 500 MILLIGRAM(S): 250 TABLET, FILM COATED ORAL at 07:39

## 2023-04-26 RX ADMIN — SIMVASTATIN 20 MILLIGRAM(S): 20 TABLET, FILM COATED ORAL at 21:29

## 2023-04-26 RX ADMIN — Medication 3 MILLIGRAM(S): at 21:29

## 2023-04-26 RX ADMIN — AMLODIPINE BESYLATE 2.5 MILLIGRAM(S): 2.5 TABLET ORAL at 07:39

## 2023-04-26 RX ADMIN — LEVETIRACETAM 500 MILLIGRAM(S): 250 TABLET, FILM COATED ORAL at 17:22

## 2023-04-26 NOTE — DIETITIAN INITIAL EVALUATION ADULT - PROBLEM SELECTOR PROBLEM 2
Neurology paged at 10:20 am Spoke with Dr. Ellis.  Agrees that pt is not a TPA candidate as last known well was 10:30 pm and pt has previous history of bleeding when previously on anticoagulation.  Failed dysphagia screen at bedside.  Family updated on plan of care at this time Chest pain

## 2023-04-26 NOTE — DIETITIAN INITIAL EVALUATION ADULT - PERTINENT LABORATORY DATA
04-26    139  |  104  |  4<L>  ----------------------------<  88  3.7   |  21<L>  |  0.66    Ca    10.2      26 Apr 2023 06:50  Phos  3.3     04-25  Mg     2.00     04-25    TPro  6.6  /  Alb  3.6  /  TBili  <0.2  /  DBili  x   /  AST  21  /  ALT  28  /  AlkPhos  94  04-25  A1C with Estimated Average Glucose Result: 5.8 % (04-25-23 @ 06:29)

## 2023-04-26 NOTE — CONSULT NOTE ADULT - REASON FOR ADMISSION
Diarrhea with hematochezia, ulcerative colitis flare
Diarrhea with hematochezia, ulcerative colitis flare

## 2023-04-26 NOTE — DIETITIAN INITIAL EVALUATION ADULT - PERTINENT MEDS FT
MEDICATIONS  (STANDING):  amLODIPine   Tablet 2.5 milliGRAM(s) Oral daily  aspirin enteric coated 81 milliGRAM(s) Oral daily  enoxaparin Injectable 40 milliGRAM(s) SubCutaneous every 24 hours  levETIRAcetam 500 milliGRAM(s) Oral two times a day  mesalamine DR Capsule 2400 milliGRAM(s) Oral daily  simvastatin 20 milliGRAM(s) Oral at bedtime    MEDICATIONS  (PRN):  acetaminophen     Tablet .. 650 milliGRAM(s) Oral every 6 hours PRN Temp greater or equal to 38C (100.4F), Mild Pain (1 - 3), Moderate Pain (4 - 6)  aluminum hydroxide/magnesium hydroxide/simethicone Suspension 30 milliLiter(s) Oral every 4 hours PRN Dyspepsia  melatonin 3 milliGRAM(s) Oral at bedtime PRN Insomnia  ondansetron Injectable 4 milliGRAM(s) IV Push every 8 hours PRN Nausea and/or Vomiting

## 2023-04-26 NOTE — CONSULT NOTE ADULT - ASSESSMENT
72F with US on Mesalamine, Seizure d/o on keppra, and HTN who presents to the hospital with complaints of diarrhea with hematochezia for the past ~1 month. patient is otherwise at her baseline. no seizures    Impression:   seizure d/o  - c/w keppra 500mg BID   - check FS, glucose control <180  - GI/DVT ppx  - Thank you for allowing me to participate in the care of this patient. Call with questions.   Cameron Oliveira MD  Vascular Neurology  Office: [phone]   72F with US on Mesalamine, Seizure d/o on keppra, and HTN who presents to the hospital with complaints of diarrhea with hematochezia for the past ~1 month. patient is otherwise at her baseline. no seizures  has been in restroom for bowel prep     Impression:   seizure d/o  - c/w keppra 500mg BID   - remaining per primary team and GI   - check FS, glucose control <180  - GI/DVT ppx  - Thank you for allowing me to participate in the care of this patient. Call with questions.   Cameron Oliveira MD  Vascular Neurology  Office: [phone]

## 2023-04-26 NOTE — PROGRESS NOTE ADULT - ASSESSMENT
72F with hx of UC (on mesalamine), Seizure, HTN, HLD who presents with diarrhea and hematochezia c/f UC flare.

## 2023-04-26 NOTE — CONSULT NOTE ADULT - SUBJECTIVE AND OBJECTIVE BOX
Neurology Consult    Reason for Consult: Patient is a 73y old  Female who presents with a chief complaint of Diarrhea with hematochezia, ulcerative colitis flare (25 Apr 2023 13:21)      HPI:  This is a 72F with history of US on Mesalamine, Seizure d/o, and HTN who presents to the hospital with complaints of diarrhea with hematochezia for the past ~1 month. States that she has been having frequent diarrhea every day, states that she has been having diarrhea every 5-10 minutes (though no diarrhea since coming to the ED). States that her mesalamine was increased by her GI doctor and she was started on imodium, yogurt, and ginger ale with limited improvement in her symptoms. Also with generalized weakness with reported weight loss of ~30 lbs over the past 1-2 months. Also with complaints of vague chest pain radiating to her L arm at times (non-exertional) and episodes of ?AMS (reports having had an EEG with her neurologist that did not show any acute findings. No current chest pain or AMS. No other acute complaints.     On arrival to the ED, her vitals were T 98.8, P 112/71, RR 16, O2 sat 100% RA. Her lab work showed mild anemia. Her CT A/P showed findings consistent with proctocolitis with reactive LAD. In ED she was given NS 1L and solucortef 60mg x1. She was admitted to medicine.  (24 Apr 2023 23:21)       PAST MEDICAL & SURGICAL HISTORY:  Hypertension      H/O finger fracture  L hand Pinning to 2 and 3rd fingers.      Chronic GERD      OA (osteoarthritis)      Osteoporosis      Ulcerative colitis      Anemia      H/O finger fracture  L hand pinning to 2nfd nd 3rd finger.      H/O myomectomy      S/P knee replacement  Left          Allergies: Allergies    No Known Allergies    Intolerances        Social History: Denies toxic habits including tobacco, ETOH or illicit drugs.    Family History: FAMILY HISTORY:  FH: hypertension  Mother.    . No family history of strokes    Medications: MEDICATIONS  (STANDING):  amLODIPine   Tablet 2.5 milliGRAM(s) Oral daily  aspirin enteric coated 81 milliGRAM(s) Oral daily  enoxaparin Injectable 40 milliGRAM(s) SubCutaneous every 24 hours  levETIRAcetam 500 milliGRAM(s) Oral two times a day  mesalamine DR Capsule 2400 milliGRAM(s) Oral daily  simvastatin 20 milliGRAM(s) Oral at bedtime    MEDICATIONS  (PRN):  acetaminophen     Tablet .. 650 milliGRAM(s) Oral every 6 hours PRN Temp greater or equal to 38C (100.4F), Mild Pain (1 - 3)  aluminum hydroxide/magnesium hydroxide/simethicone Suspension 30 milliLiter(s) Oral every 4 hours PRN Dyspepsia  melatonin 3 milliGRAM(s) Oral at bedtime PRN Insomnia  ondansetron Injectable 4 milliGRAM(s) IV Push every 8 hours PRN Nausea and/or Vomiting  oxyCODONE    IR 2.5 milliGRAM(s) Oral every 6 hours PRN Severe Pain (7 - 10)      Review of Systems:  CONSTITUTIONAL:  No weight loss, fever, chills, weakness or fatigue.  HEENT:  Eyes:  No visual loss, blurred vision, double vision or yellow sclera. Ears, Nose, Throat:  No hearing loss, sneezing, congestion, runny nose or sore throat.  SKIN:  No rash or itching.  CARDIOVASCULAR:  No chest pain, chest pressure or chest discomfort. No palpitations or edema.  RESPIRATORY:  No shortness of breath, cough or sputum.  GASTROINTESTINAL:  No anorexia, nausea, vomiting or diarrhea. No abdominal pain or blood.  GENITOURINARY:  No burning on urination or incontinence   NEUROLOGICAL:  No headache, dizziness, syncope, paralysis, ataxia, numbness or tingling in the extremities. No change in bowel or bladder control. no limb weakness. no vision changes.   MUSCULOSKELETAL:  No muscle, back pain, joint pain or stiffness.  HEMATOLOGIC:  No anemia, bleeding or bruising.  LYMPHATICS:  No enlarged nodes. No history of splenectomy.  PSYCHIATRIC:  No history of depression or anxiety.  ENDOCRINOLOGIC:  No reports of sweating, cold or heat intolerance. No polyuria or polydipsia.      Vitals:  Vital Signs Last 24 Hrs  T(C): 36.6 (26 Apr 2023 09:27), Max: 36.6 (25 Apr 2023 20:50)  T(F): 97.9 (26 Apr 2023 09:27), Max: 97.9 (25 Apr 2023 20:50)  HR: 71 (26 Apr 2023 09:27) (71 - 79)  BP: 121/76 (26 Apr 2023 09:27) (121/76 - 152/80)  BP(mean): --  RR: 18 (26 Apr 2023 09:27) (18 - 19)  SpO2: 100% (26 Apr 2023 09:27) (100% - 100%)    Parameters below as of 26 Apr 2023 06:50  Patient On (Oxygen Delivery Method): room air        General Exam:   General Appearance: Appropriately dressed and in no acute distress       Head: Normocephalic, atraumatic and no dysmorphic features  Ear, Nose, and Throat: Moist mucous membranes  CVS: S1S2+  Resp: No SOB, no wheeze or rhonchi  GI: soft NT/ND  Extremities: No edema or cyanosis  Skin: No bruises or rashes     Neurological Exam:  Mental Status: Awake, alert and oriented x 3.  Able to follow simple and complex verbal commands. Able to name and repeat. fluent speech. No obvious aphasia or dysarthria noted.   Cranial Nerves: PERRL, EOMI, VFFC, sensation V1-V3 intact,  no obvious facial asymmetry, equal elevation of palate, scm/trap 5/5, tongue is midline on protrusion. no obvious papilledema on fundoscopic exam. hearing is grossly intact.   Motor: Normal bulk, tone and strength throughout. Fine finger movements were intact and symmetric. no tremors or drift noted.    Sensation: Intact to light touch and pinprick throughout. no right/left confusion. no extinction to tactile on DSS. Romberg was negative.   Reflexes: 1+ throughout at biceps, brachioradialis, triceps, patellars and ankles bilaterally and equal. No clonus. R toe and L toe were both downgoing.  Coordination: No dysmetria on FNF or HKS  Gait: Narrow based and steady. Able to tandem. no limitations in gait.     Data/Labs/Imaging which I personally reviewed.     Labs:     CBC Full  -  ( 26 Apr 2023 06:50 )  WBC Count : 4.54 K/uL  RBC Count : 3.97 M/uL  Hemoglobin : 10.8 g/dL  Hematocrit : 33.7 %  Platelet Count - Automated : 406 K/uL  Mean Cell Volume : 84.9 fL  Mean Cell Hemoglobin : 27.2 pg  Mean Cell Hemoglobin Concentration : 32.0 gm/dL  Auto Neutrophil # : x  Auto Lymphocyte # : x  Auto Monocyte # : x  Auto Eosinophil # : x  Auto Basophil # : x  Auto Neutrophil % : x  Auto Lymphocyte % : x  Auto Monocyte % : x  Auto Eosinophil % : x  Auto Basophil % : x    04-26    139  |  104  |  4<L>  ----------------------------<  88  3.7   |  21<L>  |  0.66    Ca    10.2      26 Apr 2023 06:50  Phos  3.3     04-25  Mg     2.00     04-25    TPro  6.6  /  Alb  3.6  /  TBili  <0.2  /  DBili  x   /  AST  21  /  ALT  28  /  AlkPhos  94  04-25    LIVER FUNCTIONS - ( 25 Apr 2023 06:29 )  Alb: 3.6 g/dL / Pro: 6.6 g/dL / ALK PHOS: 94 U/L / ALT: 28 U/L / AST: 21 U/L / GGT: x           PT/INR - ( 24 Apr 2023 14:30 )   PT: 13.2 sec;   INR: 1.14 ratio         PTT - ( 24 Apr 2023 14:30 )  PTT:38.7 sec

## 2023-04-26 NOTE — DIETITIAN INITIAL EVALUATION ADULT - OTHER INFO
72F with hx of UC (on mesalamine), Seizure, HTN, HLD who presents with diarrhea and hematochezia c/f UC flare.     Pt seen and diet was advanced to Low residue, DASH/TLC from clear liquids s/p NPO for test. Pt consumed 100% of her meals per tray observation at bedside. No GI distress (nausea/vomiting/diarrhea/constipation.) at present with resolved diarrhea at this time. Pt with wt. loss of 34#/17% in past 6 months due to poor po intake and diarrhea with hematochezia x1 month. Pt reports improved appetite and 100%  po intake at present, therefore no supplement needed. Noted skin ecchymosis, no edema per nursing flow sheet.  72F with hx of UC (on mesalamine), Seizure, HTN, HLD who presents with diarrhea and hematochezia c/f UC flare.     Pt seen and diet was advanced to Low residue, DASH/TLC from clear liquids s/p NPO for test. Pt consumed 100% of her meals per tray observation at bedside. No GI distress (nausea/vomiting/diarrhea/constipation.) at present with resolved diarrhea at this time. Pt with wt. loss of 34#/17% in past 6 months due to poor po intake and diarrhea with hematochezia x1 month. Pt reports improved appetite and 100%  po intake at present, suggest supplement Ensure Clear 2x daily (360 юлия and 16 gm protein).   Noted skin ecchymosis, no edema per nursing flow sheet.

## 2023-04-26 NOTE — DIETITIAN INITIAL EVALUATION ADULT - ORAL INTAKE PTA/DIET HISTORY
Pt reports good appetite at home, had c/o diarrhea likely ulcerative colitis flare pta but now improved. Pt with wt. loss in past 6 months. UBW- 201.5# (10/26/22).

## 2023-04-26 NOTE — PROGRESS NOTE ADULT - PROBLEM SELECTOR PLAN 2
Resolved  - EKG non-ischemic, trops negative   - TTE with nl LV and RV function   - no events on telemetry, will discontinue

## 2023-04-26 NOTE — PROGRESS NOTE ADULT - PROBLEM SELECTOR PLAN 1
pw with diarrhea with hematochezia (though none noted on recent BMs)  - CT showing proctocolitis with reactive LAD  - CRP elevated (trend ESR/CRP daily), GI PCR pending collection  - appreciate GI recs: plan for flex sig today   - pain control: PRN acetaminophen mild/moderate pain, oxycodone 2.5mg q6h PRN severe pain pw with diarrhea with hematochezia (though none noted on recent BMs)  - CT showing proctocolitis with reactive LAD  - CRP elevated (trend ESR/CRP daily), GI PCR pending collection  - appreciate GI recs: plan for flex sig today

## 2023-04-26 NOTE — DIETITIAN INITIAL EVALUATION ADULT - NS FNS DIET ORDER
Diet, Regular:   Fiber/Residue Restricted (LOWFIBER)  DASH/TLC {Sodium & Cholesterol Restricted} (DASH) (04-26-23 @ 12:29)

## 2023-04-26 NOTE — PACU DISCHARGE NOTE - COMMENTS
Plan: Apply Sunscreen 35 SPF or greater daily to sun exposed areas. no apparent anesthesia complications Detail Level: Zone

## 2023-04-27 LAB
ANION GAP SERPL CALC-SCNC: 13 MMOL/L — SIGNIFICANT CHANGE UP (ref 7–14)
BUN SERPL-MCNC: 10 MG/DL — SIGNIFICANT CHANGE UP (ref 7–23)
C DIFF BY PCR RESULT: DETECTED
CALCIUM SERPL-MCNC: 10 MG/DL — SIGNIFICANT CHANGE UP (ref 8.4–10.5)
CHLORIDE SERPL-SCNC: 104 MMOL/L — SIGNIFICANT CHANGE UP (ref 98–107)
CO2 SERPL-SCNC: 22 MMOL/L — SIGNIFICANT CHANGE UP (ref 22–31)
CREAT SERPL-MCNC: 0.7 MG/DL — SIGNIFICANT CHANGE UP (ref 0.5–1.3)
EGFR: 91 ML/MIN/1.73M2 — SIGNIFICANT CHANGE UP
GAMMA INTERFERON BACKGROUND BLD IA-ACNC: 0.02 IU/ML — SIGNIFICANT CHANGE UP
GI PCR PANEL: SIGNIFICANT CHANGE UP
GLUCOSE SERPL-MCNC: 97 MG/DL — SIGNIFICANT CHANGE UP (ref 70–99)
HCT VFR BLD CALC: 32.3 % — LOW (ref 34.5–45)
HGB BLD-MCNC: 10.5 G/DL — LOW (ref 11.5–15.5)
M TB IFN-G BLD-IMP: NEGATIVE — SIGNIFICANT CHANGE UP
M TB IFN-G CD4+ BCKGRND COR BLD-ACNC: 0.01 IU/ML — SIGNIFICANT CHANGE UP
M TB IFN-G CD4+CD8+ BCKGRND COR BLD-ACNC: 0 IU/ML — SIGNIFICANT CHANGE UP
MAGNESIUM SERPL-MCNC: 2.1 MG/DL — SIGNIFICANT CHANGE UP (ref 1.6–2.6)
MCHC RBC-ENTMCNC: 26.7 PG — LOW (ref 27–34)
MCHC RBC-ENTMCNC: 32.5 GM/DL — SIGNIFICANT CHANGE UP (ref 32–36)
MCV RBC AUTO: 82.2 FL — SIGNIFICANT CHANGE UP (ref 80–100)
NRBC # BLD: 0 /100 WBCS — SIGNIFICANT CHANGE UP (ref 0–0)
NRBC # FLD: 0 K/UL — SIGNIFICANT CHANGE UP (ref 0–0)
PHOSPHATE SERPL-MCNC: 2.8 MG/DL — SIGNIFICANT CHANGE UP (ref 2.5–4.5)
PLATELET # BLD AUTO: 405 K/UL — HIGH (ref 150–400)
POTASSIUM SERPL-MCNC: 3.9 MMOL/L — SIGNIFICANT CHANGE UP (ref 3.5–5.3)
POTASSIUM SERPL-SCNC: 3.9 MMOL/L — SIGNIFICANT CHANGE UP (ref 3.5–5.3)
QUANT TB PLUS MITOGEN MINUS NIL: 1.84 IU/ML — SIGNIFICANT CHANGE UP
RBC # BLD: 3.93 M/UL — SIGNIFICANT CHANGE UP (ref 3.8–5.2)
RBC # FLD: 14.6 % — HIGH (ref 10.3–14.5)
SODIUM SERPL-SCNC: 139 MMOL/L — SIGNIFICANT CHANGE UP (ref 135–145)
WBC # BLD: 4.73 K/UL — SIGNIFICANT CHANGE UP (ref 3.8–10.5)
WBC # FLD AUTO: 4.73 K/UL — SIGNIFICANT CHANGE UP (ref 3.8–10.5)

## 2023-04-27 PROCEDURE — 99232 SBSQ HOSP IP/OBS MODERATE 35: CPT

## 2023-04-27 PROCEDURE — 99232 SBSQ HOSP IP/OBS MODERATE 35: CPT | Mod: GC

## 2023-04-27 RX ORDER — VANCOMYCIN HCL 1 G
125 VIAL (EA) INTRAVENOUS EVERY 6 HOURS
Refills: 0 | Status: DISCONTINUED | OUTPATIENT
Start: 2023-04-27 | End: 2023-05-02

## 2023-04-27 RX ADMIN — LEVETIRACETAM 500 MILLIGRAM(S): 250 TABLET, FILM COATED ORAL at 17:15

## 2023-04-27 RX ADMIN — Medication 81 MILLIGRAM(S): at 11:49

## 2023-04-27 RX ADMIN — Medication 20 MILLIGRAM(S): at 21:17

## 2023-04-27 RX ADMIN — LEVETIRACETAM 500 MILLIGRAM(S): 250 TABLET, FILM COATED ORAL at 05:08

## 2023-04-27 RX ADMIN — AMLODIPINE BESYLATE 2.5 MILLIGRAM(S): 2.5 TABLET ORAL at 05:08

## 2023-04-27 RX ADMIN — Medication 125 MILLIGRAM(S): at 23:49

## 2023-04-27 RX ADMIN — ENOXAPARIN SODIUM 40 MILLIGRAM(S): 100 INJECTION SUBCUTANEOUS at 10:57

## 2023-04-27 RX ADMIN — Medication 2400 MILLIGRAM(S): at 21:17

## 2023-04-27 RX ADMIN — SIMVASTATIN 20 MILLIGRAM(S): 20 TABLET, FILM COATED ORAL at 21:17

## 2023-04-27 NOTE — PROGRESS NOTE ADULT - PROBLEM SELECTOR PLAN 2
Resolved  - EKG non-ischemic, trops negative   - TTE with nl LV and RV function   - no events on telemetry, will discontinue RESOLVED  - EKG non-ischemic, trops negative   - TTE with nl LV and RV function   - no events on telemetry, will discontinue

## 2023-04-27 NOTE — PHYSICAL THERAPY INITIAL EVALUATION ADULT - ADDITIONAL COMMENTS
Pt. owns a straight cane and rolling walker.     Pt. was left seated at edge of bed post PT Evaluation, no apparent distress, call bell within reach.

## 2023-04-27 NOTE — PROGRESS NOTE ADULT - ASSESSMENT
72F with hx of UC (on mesalamine), Seizure, HTN, HLD who presents with diarrhea and hematochezia c/f UC flare s/p flex sig 4/26.

## 2023-04-27 NOTE — CHART NOTE - NSCHARTNOTEFT_GEN_A_CORE
C Diff and fecal calprotectin sample obtained and sent to lab. Per Dr. Tran discussion with GI Dr. Whitfield, recommend to start IV methylprednisolone 20 mg Q8 hours once sample is sent and specimen received. Will continue to monitor and follow up results.

## 2023-04-27 NOTE — PROGRESS NOTE ADULT - ASSESSMENT
Impressions:     #Hematochezia  #Diarrhea  #Ulcerative colitis  Dx > 10 yrs ago, currently on Mesalamine TID, unknown dose, previously tried sulfasalazine, responded well. Never been on biologic treatment. Has responded to steroids in the past. Used to follow with Dr. Cole, her last colonoscopy in 2016, colitis from the transverse colon to the rectum. Path positive for active colitis. Now transitioned care to Dr. Susana Corbett in [facility]. Currently, developed diarrhea one month ago, progressively worse w/ blood, has fecal urgency.   - CT A/P showed colitis from the TC to the rectum.   - Elevated CRP 37.   - Differentials likely either infectious diarrhea vs. UC flare. GI PCR neg, c diff testing pending.   - Hep surface Ab positive, Ag neg.     Recommendations:   - C diff pending.   - Hold off on steroids till the infectious work up is negative.   - Quantiferon TB testing pending.   - If the c diff is neg, will consider starting her on steroids for UC flare. Patient will likely need to transition to biologic therapy as o/p.   - Place them on DVT PPx as IBD patients are high risk for thrombosis.   - Avoid NSAIDs and opioids.   - ESR and CRP daily.     GI will continue to follow.     All recommendations are tentative until note is attested by an attending.     Vanita Whitfield, PGY-4  Gastroenterology/Hepatology Fellow  Available on Microsoft Teams  [phone] (Short Range Pager)  [phone] (Long Range Pager)    After 5pm, please contact the on-call GI fellow. [phone]   Impressions:     #Hematochezia  #Diarrhea  #Ulcerative colitis  Dx > 10 yrs ago, currently on Mesalamine TID, unknown dose, previously tried sulfasalazine, responded well. Never been on biologic treatment. Has responded to steroids in the past. Used to follow with Dr. Cole, her last colonoscopy in 2016, colitis from the transverse colon to the rectum. Path positive for active colitis. Now transitioned care to Dr. Susana Corbett in [facility]. Currently, developed diarrhea one month ago, progressively worse w/ blood, has fecal urgency.   - CT A/P showed colitis from the TC to the rectum.   - Elevated CRP 37.   - Differentials likely either infectious diarrhea vs. UC flare. GI PCR neg, c diff testing pending.   - Hep surface Ab positive, Ag neg.     Recommendations:   - C diff pending.   - After the c. diff sample is collected, please start her on IV methylprednisolone 20 mg Q8 hours today.   - Quantiferon TB testing pending.   - If the c diff is neg, will consider starting her on steroids for UC flare. Patient will likely need to transition to biologic therapy as o/p.   - Place them on DVT PPx as IBD patients are high risk for thrombosis.   - Avoid NSAIDs and opioids.   - ESR and CRP daily.     GI will continue to follow.     All recommendations are tentative until note is attested by an attending.     Vanita Whitfield, PGY-4  Gastroenterology/Hepatology Fellow  Available on Microsoft Teams  [phone] (Short Range Pager)  [phone] (Long Range Pager)    After 5pm, please contact the on-call GI fellow. [phone]

## 2023-04-27 NOTE — PROGRESS NOTE ADULT - PROBLEM SELECTOR PLAN 1
pw with diarrhea with hematochezia (though none noted on recent BMs)  - CT showing proctocolitis with reactive LAD  - CRP elevated (trend ESR/CRP daily), GI PCR negative, C.diff/fecal calpro pending collection  - s/p flex sig 4/26: diffuse moderate mucosal changes were found in the rectum secondary to left-sided ulcerative colitis (biopsied). Hematochezia and diarrhea likely due to ulcerative colitis, need to r/o infection.   - appreciate GI recs: f/u path, will consider steroids if infectious work up negative pw with diarrhea with hematochezia (though none noted on recent BMs)  - CT showing proctocolitis with reactive LAD  - CRP elevated (trend ESR/CRP daily), GI PCR negative, C.diff/fecal calpro pending collection  - s/p flex sig 4/26: diffuse moderate mucosal changes were found in the rectum secondary to left-sided ulcerative colitis (biopsied). Hematochezia and diarrhea likely due to ulcerative colitis, need to r/o infection.   - appreciate GI recs: f/u path, start Solumedrol 20mg q8h once c.diff collected   - pain control: PRN Tylenol, avoid NSAIDs/opiates per GI

## 2023-04-28 DIAGNOSIS — A04.72 ENTEROCOLITIS DUE TO CLOSTRIDIUM DIFFICILE, NOT SPECIFIED AS RECURRENT: ICD-10-CM

## 2023-04-28 LAB
ANION GAP SERPL CALC-SCNC: 11 MMOL/L — SIGNIFICANT CHANGE UP (ref 7–14)
BUN SERPL-MCNC: 7 MG/DL — SIGNIFICANT CHANGE UP (ref 7–23)
CALCIUM SERPL-MCNC: 10.4 MG/DL — SIGNIFICANT CHANGE UP (ref 8.4–10.5)
CHLORIDE SERPL-SCNC: 103 MMOL/L — SIGNIFICANT CHANGE UP (ref 98–107)
CO2 SERPL-SCNC: 23 MMOL/L — SIGNIFICANT CHANGE UP (ref 22–31)
CREAT SERPL-MCNC: 0.67 MG/DL — SIGNIFICANT CHANGE UP (ref 0.5–1.3)
CRP SERPL-MCNC: 18.7 MG/L — HIGH
EGFR: 92 ML/MIN/1.73M2 — SIGNIFICANT CHANGE UP
ERYTHROCYTE [SEDIMENTATION RATE] IN BLOOD: 37 MM/HR — HIGH (ref 4–25)
GLUCOSE SERPL-MCNC: 143 MG/DL — HIGH (ref 70–99)
HCT VFR BLD CALC: 35.4 % — SIGNIFICANT CHANGE UP (ref 34.5–45)
HGB BLD-MCNC: 11.3 G/DL — LOW (ref 11.5–15.5)
MAGNESIUM SERPL-MCNC: 2.2 MG/DL — SIGNIFICANT CHANGE UP (ref 1.6–2.6)
MCHC RBC-ENTMCNC: 26.6 PG — LOW (ref 27–34)
MCHC RBC-ENTMCNC: 31.9 GM/DL — LOW (ref 32–36)
MCV RBC AUTO: 83.3 FL — SIGNIFICANT CHANGE UP (ref 80–100)
NRBC # BLD: 0 /100 WBCS — SIGNIFICANT CHANGE UP (ref 0–0)
NRBC # FLD: 0 K/UL — SIGNIFICANT CHANGE UP (ref 0–0)
PHOSPHATE SERPL-MCNC: 3.3 MG/DL — SIGNIFICANT CHANGE UP (ref 2.5–4.5)
PLATELET # BLD AUTO: 438 K/UL — HIGH (ref 150–400)
POTASSIUM SERPL-MCNC: 4.2 MMOL/L — SIGNIFICANT CHANGE UP (ref 3.5–5.3)
POTASSIUM SERPL-SCNC: 4.2 MMOL/L — SIGNIFICANT CHANGE UP (ref 3.5–5.3)
RBC # BLD: 4.25 M/UL — SIGNIFICANT CHANGE UP (ref 3.8–5.2)
RBC # FLD: 14.6 % — HIGH (ref 10.3–14.5)
SODIUM SERPL-SCNC: 137 MMOL/L — SIGNIFICANT CHANGE UP (ref 135–145)
WBC # BLD: 4.92 K/UL — SIGNIFICANT CHANGE UP (ref 3.8–10.5)
WBC # FLD AUTO: 4.92 K/UL — SIGNIFICANT CHANGE UP (ref 3.8–10.5)

## 2023-04-28 PROCEDURE — 99232 SBSQ HOSP IP/OBS MODERATE 35: CPT | Mod: GC

## 2023-04-28 PROCEDURE — 99232 SBSQ HOSP IP/OBS MODERATE 35: CPT

## 2023-04-28 RX ADMIN — LEVETIRACETAM 500 MILLIGRAM(S): 250 TABLET, FILM COATED ORAL at 18:29

## 2023-04-28 RX ADMIN — Medication 125 MILLIGRAM(S): at 05:36

## 2023-04-28 RX ADMIN — Medication 2400 MILLIGRAM(S): at 21:54

## 2023-04-28 RX ADMIN — ENOXAPARIN SODIUM 40 MILLIGRAM(S): 100 INJECTION SUBCUTANEOUS at 11:25

## 2023-04-28 RX ADMIN — Medication 650 MILLIGRAM(S): at 21:26

## 2023-04-28 RX ADMIN — AMLODIPINE BESYLATE 2.5 MILLIGRAM(S): 2.5 TABLET ORAL at 05:37

## 2023-04-28 RX ADMIN — Medication 125 MILLIGRAM(S): at 18:29

## 2023-04-28 RX ADMIN — Medication 650 MILLIGRAM(S): at 20:26

## 2023-04-28 RX ADMIN — SIMVASTATIN 20 MILLIGRAM(S): 20 TABLET, FILM COATED ORAL at 21:53

## 2023-04-28 RX ADMIN — Medication 20 MILLIGRAM(S): at 05:38

## 2023-04-28 RX ADMIN — LEVETIRACETAM 500 MILLIGRAM(S): 250 TABLET, FILM COATED ORAL at 05:37

## 2023-04-28 RX ADMIN — Medication 81 MILLIGRAM(S): at 11:25

## 2023-04-28 RX ADMIN — Medication 125 MILLIGRAM(S): at 12:26

## 2023-04-28 NOTE — PROGRESS NOTE ADULT - PROBLEM SELECTOR PLAN 2
pw with diarrhea with hematochezia (though none noted on recent BMs)  - Follows OP with GI, Dr. Susana Corbett on [facility].  - CT showing proctocolitis with reactive LAD  - CRP elevated (trend ESR/CRP daily), GI PCR negative, C.diff/fecal calpro pending collection  - s/p flex sig 4/26: diffuse moderate mucosal changes were found in the rectum secondary to left-sided ulcerative colitis (biopsied). Hematochezia and diarrhea likely due to ulcerative colitis, need to r/o infection.   - appreciate GI recs s/p 2 doses of IV methylprednisolone, unclear if steroids or antibiotics improved her symptoms   - pain control: PRN Tylenol, avoid NSAIDs/opiates per GI

## 2023-04-28 NOTE — PROGRESS NOTE ADULT - ASSESSMENT
Impressions:     #Hematochezia  #Diarrhea  #Ulcerative colitis  Dx > 10 yrs ago, currently on Mesalamine TID, unknown dose, previously tried sulfasalazine, responded well. Never been on biologic treatment. Has responded to steroids in the past. Used to follow with Dr. Cole, her last colonoscopy in 2016, colitis from the transverse colon to the rectum. Path positive for active colitis. Now transitioned care to Dr. Susana Corbett in [facility]. Currently, developed diarrhea one month ago, progressively worse w/ blood, has fecal urgency.   - CT A/P showed colitis from the TC to the rectum.   - CRP 37 on admission, trending down.  - Hep surface Ab positive, Ag neg.   - C diff positive, oral vancomycin (4/27 - ). She was started on IV steroids on 4/27 which is being held now.     Recommendations:   - Please continue with oral vancomycin.   - Steroids on hold for now.   - Quantiferon TB testing pending.   - DVT PPx as IBD patients are high risk for thrombosis.   - Avoid NSAIDs and opioids.   - ESR and CRP daily.     GI will continue to follow.     All recommendations are tentative until note is attested by an attending.     Vanita Whitfield, PGY-4  Gastroenterology/Hepatology Fellow  Available on Microsoft Teams  [phone] (Short Range Pager)  [phone] (Long Range Pager)    After 5pm, please contact the on-call GI fellow. [phone]

## 2023-04-29 LAB
ANION GAP SERPL CALC-SCNC: 14 MMOL/L — SIGNIFICANT CHANGE UP (ref 7–14)
BUN SERPL-MCNC: 19 MG/DL — SIGNIFICANT CHANGE UP (ref 7–23)
CALCIUM SERPL-MCNC: 10.2 MG/DL — SIGNIFICANT CHANGE UP (ref 8.4–10.5)
CHLORIDE SERPL-SCNC: 103 MMOL/L — SIGNIFICANT CHANGE UP (ref 98–107)
CO2 SERPL-SCNC: 22 MMOL/L — SIGNIFICANT CHANGE UP (ref 22–31)
CREAT SERPL-MCNC: 0.74 MG/DL — SIGNIFICANT CHANGE UP (ref 0.5–1.3)
CRP SERPL-MCNC: 10.9 MG/L — HIGH
CULTURE RESULTS: SIGNIFICANT CHANGE UP
EGFR: 85 ML/MIN/1.73M2 — SIGNIFICANT CHANGE UP
ERYTHROCYTE [SEDIMENTATION RATE] IN BLOOD: 38 MM/HR — HIGH (ref 4–25)
GLUCOSE SERPL-MCNC: 101 MG/DL — HIGH (ref 70–99)
HCT VFR BLD CALC: 32.7 % — LOW (ref 34.5–45)
HGB BLD-MCNC: 10.7 G/DL — LOW (ref 11.5–15.5)
MAGNESIUM SERPL-MCNC: 2 MG/DL — SIGNIFICANT CHANGE UP (ref 1.6–2.6)
MCHC RBC-ENTMCNC: 27.7 PG — SIGNIFICANT CHANGE UP (ref 27–34)
MCHC RBC-ENTMCNC: 32.7 GM/DL — SIGNIFICANT CHANGE UP (ref 32–36)
MCV RBC AUTO: 84.7 FL — SIGNIFICANT CHANGE UP (ref 80–100)
NRBC # BLD: 0 /100 WBCS — SIGNIFICANT CHANGE UP (ref 0–0)
NRBC # FLD: 0 K/UL — SIGNIFICANT CHANGE UP (ref 0–0)
PHOSPHATE SERPL-MCNC: 3 MG/DL — SIGNIFICANT CHANGE UP (ref 2.5–4.5)
PLATELET # BLD AUTO: 397 K/UL — SIGNIFICANT CHANGE UP (ref 150–400)
POTASSIUM SERPL-MCNC: 4 MMOL/L — SIGNIFICANT CHANGE UP (ref 3.5–5.3)
POTASSIUM SERPL-SCNC: 4 MMOL/L — SIGNIFICANT CHANGE UP (ref 3.5–5.3)
RBC # BLD: 3.86 M/UL — SIGNIFICANT CHANGE UP (ref 3.8–5.2)
RBC # FLD: 14.8 % — HIGH (ref 10.3–14.5)
SODIUM SERPL-SCNC: 139 MMOL/L — SIGNIFICANT CHANGE UP (ref 135–145)
SPECIMEN SOURCE: SIGNIFICANT CHANGE UP
WBC # BLD: 8.55 K/UL — SIGNIFICANT CHANGE UP (ref 3.8–10.5)
WBC # FLD AUTO: 8.55 K/UL — SIGNIFICANT CHANGE UP (ref 3.8–10.5)

## 2023-04-29 PROCEDURE — 99232 SBSQ HOSP IP/OBS MODERATE 35: CPT

## 2023-04-29 RX ORDER — FLUTICASONE PROPIONATE 50 MCG
1 SPRAY, SUSPENSION NASAL
Refills: 0 | Status: DISCONTINUED | OUTPATIENT
Start: 2023-04-29 | End: 2023-05-02

## 2023-04-29 RX ORDER — FLUTICASONE PROPIONATE 50 MCG
1 SPRAY, SUSPENSION NASAL
Refills: 0 | Status: DISCONTINUED | OUTPATIENT
Start: 2023-04-29 | End: 2023-04-29

## 2023-04-29 RX ADMIN — ENOXAPARIN SODIUM 40 MILLIGRAM(S): 100 INJECTION SUBCUTANEOUS at 11:58

## 2023-04-29 RX ADMIN — Medication 125 MILLIGRAM(S): at 22:38

## 2023-04-29 RX ADMIN — AMLODIPINE BESYLATE 2.5 MILLIGRAM(S): 2.5 TABLET ORAL at 06:00

## 2023-04-29 RX ADMIN — Medication 125 MILLIGRAM(S): at 06:00

## 2023-04-29 RX ADMIN — LEVETIRACETAM 500 MILLIGRAM(S): 250 TABLET, FILM COATED ORAL at 06:00

## 2023-04-29 RX ADMIN — Medication 650 MILLIGRAM(S): at 06:02

## 2023-04-29 RX ADMIN — Medication 125 MILLIGRAM(S): at 17:12

## 2023-04-29 RX ADMIN — Medication 125 MILLIGRAM(S): at 11:58

## 2023-04-29 RX ADMIN — LEVETIRACETAM 500 MILLIGRAM(S): 250 TABLET, FILM COATED ORAL at 17:12

## 2023-04-29 RX ADMIN — Medication 81 MILLIGRAM(S): at 11:58

## 2023-04-29 RX ADMIN — SIMVASTATIN 20 MILLIGRAM(S): 20 TABLET, FILM COATED ORAL at 22:37

## 2023-04-29 RX ADMIN — Medication 2400 MILLIGRAM(S): at 22:36

## 2023-04-29 RX ADMIN — Medication 125 MILLIGRAM(S): at 00:12

## 2023-04-29 NOTE — PROGRESS NOTE ADULT - PROBLEM SELECTOR PLAN 1
pw with diarrhea with hematochezia (though none noted on recent BMs)  - Follows OP with GI, Dr. Susana Corbett on [facility].  - CT showing proctocolitis with reactive LAD  - CRP elevated (trend ESR/CRP daily), GI PCR negative, C.diff/fecal calpro pending collection  - s/p flex sig 4/26: diffuse moderate mucosal changes were found in the rectum secondary to left-sided ulcerative colitis (biopsied). Hematochezia and diarrhea likely due to ulcerative colitis, need to r/o infection.   - appreciate GI recs s/p 2 doses of IV methylprednisolone, unclear if steroids or antibiotics improved her symptoms   - pain control: PRN Tylenol, avoid NSAIDs/opiates per GI

## 2023-04-30 LAB
ANION GAP SERPL CALC-SCNC: 12 MMOL/L — SIGNIFICANT CHANGE UP (ref 7–14)
BUN SERPL-MCNC: 12 MG/DL — SIGNIFICANT CHANGE UP (ref 7–23)
CALCIUM SERPL-MCNC: 9.7 MG/DL — SIGNIFICANT CHANGE UP (ref 8.4–10.5)
CHLORIDE SERPL-SCNC: 105 MMOL/L — SIGNIFICANT CHANGE UP (ref 98–107)
CO2 SERPL-SCNC: 22 MMOL/L — SIGNIFICANT CHANGE UP (ref 22–31)
CREAT SERPL-MCNC: 0.71 MG/DL — SIGNIFICANT CHANGE UP (ref 0.5–1.3)
EGFR: 89 ML/MIN/1.73M2 — SIGNIFICANT CHANGE UP
GLUCOSE SERPL-MCNC: 85 MG/DL — SIGNIFICANT CHANGE UP (ref 70–99)
HCT VFR BLD CALC: 30.4 % — LOW (ref 34.5–45)
HGB BLD-MCNC: 9.9 G/DL — LOW (ref 11.5–15.5)
MAGNESIUM SERPL-MCNC: 1.9 MG/DL — SIGNIFICANT CHANGE UP (ref 1.6–2.6)
MCHC RBC-ENTMCNC: 27.4 PG — SIGNIFICANT CHANGE UP (ref 27–34)
MCHC RBC-ENTMCNC: 32.6 GM/DL — SIGNIFICANT CHANGE UP (ref 32–36)
MCV RBC AUTO: 84.2 FL — SIGNIFICANT CHANGE UP (ref 80–100)
NRBC # BLD: 0 /100 WBCS — SIGNIFICANT CHANGE UP (ref 0–0)
NRBC # FLD: 0 K/UL — SIGNIFICANT CHANGE UP (ref 0–0)
PHOSPHATE SERPL-MCNC: 3 MG/DL — SIGNIFICANT CHANGE UP (ref 2.5–4.5)
PLATELET # BLD AUTO: 382 K/UL — SIGNIFICANT CHANGE UP (ref 150–400)
POTASSIUM SERPL-MCNC: 3.9 MMOL/L — SIGNIFICANT CHANGE UP (ref 3.5–5.3)
POTASSIUM SERPL-SCNC: 3.9 MMOL/L — SIGNIFICANT CHANGE UP (ref 3.5–5.3)
RBC # BLD: 3.61 M/UL — LOW (ref 3.8–5.2)
RBC # FLD: 15 % — HIGH (ref 10.3–14.5)
SODIUM SERPL-SCNC: 139 MMOL/L — SIGNIFICANT CHANGE UP (ref 135–145)
WBC # BLD: 7 K/UL — SIGNIFICANT CHANGE UP (ref 3.8–10.5)
WBC # FLD AUTO: 7 K/UL — SIGNIFICANT CHANGE UP (ref 3.8–10.5)

## 2023-04-30 PROCEDURE — 99232 SBSQ HOSP IP/OBS MODERATE 35: CPT

## 2023-04-30 RX ADMIN — Medication 81 MILLIGRAM(S): at 11:39

## 2023-04-30 RX ADMIN — Medication 2400 MILLIGRAM(S): at 22:31

## 2023-04-30 RX ADMIN — LEVETIRACETAM 500 MILLIGRAM(S): 250 TABLET, FILM COATED ORAL at 05:27

## 2023-04-30 RX ADMIN — Medication 125 MILLIGRAM(S): at 05:28

## 2023-04-30 RX ADMIN — Medication 125 MILLIGRAM(S): at 11:38

## 2023-04-30 RX ADMIN — ENOXAPARIN SODIUM 40 MILLIGRAM(S): 100 INJECTION SUBCUTANEOUS at 11:38

## 2023-04-30 RX ADMIN — AMLODIPINE BESYLATE 2.5 MILLIGRAM(S): 2.5 TABLET ORAL at 05:28

## 2023-04-30 RX ADMIN — Medication 650 MILLIGRAM(S): at 05:31

## 2023-04-30 RX ADMIN — LEVETIRACETAM 500 MILLIGRAM(S): 250 TABLET, FILM COATED ORAL at 17:58

## 2023-04-30 RX ADMIN — SIMVASTATIN 20 MILLIGRAM(S): 20 TABLET, FILM COATED ORAL at 22:32

## 2023-04-30 RX ADMIN — Medication 125 MILLIGRAM(S): at 17:58

## 2023-05-01 LAB
ANION GAP SERPL CALC-SCNC: 13 MMOL/L — SIGNIFICANT CHANGE UP (ref 7–14)
BUN SERPL-MCNC: 11 MG/DL — SIGNIFICANT CHANGE UP (ref 7–23)
CALCIUM SERPL-MCNC: 9.8 MG/DL — SIGNIFICANT CHANGE UP (ref 8.4–10.5)
CHLORIDE SERPL-SCNC: 105 MMOL/L — SIGNIFICANT CHANGE UP (ref 98–107)
CO2 SERPL-SCNC: 22 MMOL/L — SIGNIFICANT CHANGE UP (ref 22–31)
CREAT SERPL-MCNC: 0.79 MG/DL — SIGNIFICANT CHANGE UP (ref 0.5–1.3)
CRP SERPL-MCNC: 6.8 MG/L — HIGH
EGFR: 78 ML/MIN/1.73M2 — SIGNIFICANT CHANGE UP
ERYTHROCYTE [SEDIMENTATION RATE] IN BLOOD: 34 MM/HR — HIGH (ref 4–25)
GLUCOSE SERPL-MCNC: 89 MG/DL — SIGNIFICANT CHANGE UP (ref 70–99)
HCT VFR BLD CALC: 32 % — LOW (ref 34.5–45)
HGB BLD-MCNC: 10.3 G/DL — LOW (ref 11.5–15.5)
MAGNESIUM SERPL-MCNC: 1.9 MG/DL — SIGNIFICANT CHANGE UP (ref 1.6–2.6)
MCHC RBC-ENTMCNC: 27.5 PG — SIGNIFICANT CHANGE UP (ref 27–34)
MCHC RBC-ENTMCNC: 32.2 GM/DL — SIGNIFICANT CHANGE UP (ref 32–36)
MCV RBC AUTO: 85.6 FL — SIGNIFICANT CHANGE UP (ref 80–100)
NRBC # BLD: 0 /100 WBCS — SIGNIFICANT CHANGE UP (ref 0–0)
NRBC # FLD: 0 K/UL — SIGNIFICANT CHANGE UP (ref 0–0)
PHOSPHATE SERPL-MCNC: 3.2 MG/DL — SIGNIFICANT CHANGE UP (ref 2.5–4.5)
PLATELET # BLD AUTO: 384 K/UL — SIGNIFICANT CHANGE UP (ref 150–400)
POTASSIUM SERPL-MCNC: 4.1 MMOL/L — SIGNIFICANT CHANGE UP (ref 3.5–5.3)
POTASSIUM SERPL-SCNC: 4.1 MMOL/L — SIGNIFICANT CHANGE UP (ref 3.5–5.3)
RBC # BLD: 3.74 M/UL — LOW (ref 3.8–5.2)
RBC # FLD: 15.3 % — HIGH (ref 10.3–14.5)
SODIUM SERPL-SCNC: 140 MMOL/L — SIGNIFICANT CHANGE UP (ref 135–145)
SURGICAL PATHOLOGY STUDY: SIGNIFICANT CHANGE UP
WBC # BLD: 9.13 K/UL — SIGNIFICANT CHANGE UP (ref 3.8–10.5)
WBC # FLD AUTO: 9.13 K/UL — SIGNIFICANT CHANGE UP (ref 3.8–10.5)

## 2023-05-01 PROCEDURE — 99233 SBSQ HOSP IP/OBS HIGH 50: CPT

## 2023-05-01 PROCEDURE — 99232 SBSQ HOSP IP/OBS MODERATE 35: CPT | Mod: GC

## 2023-05-01 RX ADMIN — LEVETIRACETAM 500 MILLIGRAM(S): 250 TABLET, FILM COATED ORAL at 05:37

## 2023-05-01 RX ADMIN — Medication 650 MILLIGRAM(S): at 01:22

## 2023-05-01 RX ADMIN — Medication 650 MILLIGRAM(S): at 01:05

## 2023-05-01 RX ADMIN — LEVETIRACETAM 500 MILLIGRAM(S): 250 TABLET, FILM COATED ORAL at 17:26

## 2023-05-01 RX ADMIN — Medication 125 MILLIGRAM(S): at 05:37

## 2023-05-01 RX ADMIN — Medication 125 MILLIGRAM(S): at 17:26

## 2023-05-01 RX ADMIN — Medication 2400 MILLIGRAM(S): at 21:22

## 2023-05-01 RX ADMIN — SIMVASTATIN 20 MILLIGRAM(S): 20 TABLET, FILM COATED ORAL at 21:22

## 2023-05-01 RX ADMIN — Medication 81 MILLIGRAM(S): at 11:23

## 2023-05-01 RX ADMIN — ENOXAPARIN SODIUM 40 MILLIGRAM(S): 100 INJECTION SUBCUTANEOUS at 11:25

## 2023-05-01 RX ADMIN — Medication 125 MILLIGRAM(S): at 11:23

## 2023-05-01 RX ADMIN — Medication 125 MILLIGRAM(S): at 01:05

## 2023-05-01 RX ADMIN — AMLODIPINE BESYLATE 2.5 MILLIGRAM(S): 2.5 TABLET ORAL at 11:23

## 2023-05-01 NOTE — PROGRESS NOTE ADULT - ASSESSMENT
Impressions:     #Hematochezia  #Diarrhea  #Ulcerative colitis  Dx > 10 yrs ago, currently on Mesalamine TID, unknown dose, previously tried sulfasalazine, responded well. Never been on biologic treatment. Has responded to steroids in the past. Used to follow with Dr. Cole, her last colonoscopy in 2016, colitis from the transverse colon to the rectum. Path positive for active colitis. Now transitioned care to Dr. Susana Corbett in [facility]. Currently, developed diarrhea one month ago, progressively worse w/ blood, has fecal urgency.   - CT A/P showed colitis from the TC to the rectum.   - CRP 37 on admission, trending down.  - Hep surface Ab positive, Ag neg. Quant TB neg.  - C diff positive, oral vancomycin (4/27 - ). She was started on IV steroids on 4/27 which is being held now. Symptoms have improved.     Recommendations:   - Please continue with oral vancomycin.   - Steroids on hold for now.   - If the patient has persistent diarrhea tomorrow, can consider starting PO steroids tomorrow.   - DVT PPx as IBD patients are high risk for thrombosis.   - Avoid NSAIDs and opioids.   - ESR and CRP daily.     GI will continue to follow.     All recommendations are tentative until note is attested by an attending.     Vanita Whitfield, PGY-4  Gastroenterology/Hepatology Fellow  Available on [employer] Teams  [phone] (Short Range Pager)  [phone] (Long Range Pager)    After 5pm, please contact the on-call GI fellow. [phone]

## 2023-05-01 NOTE — PROGRESS NOTE ADULT - PROBLEM SELECTOR PLAN 2
- Follows OP with GI, Dr. Susana Corbett on [facility].  - s/p flex sig 4/26: diffuse moderate mucosal changes were found in the rectum secondary to left-sided ulcerative colitis (biopsied). Hematochezia and diarrhea likely due to ulcerative colitis  - appreciate GI recs s/p 2 doses of IV methylprednisolone - held after C. diff colitis diagnosed.   - pain control: PRN Tylenol, avoid NSAIDs/opiates per GI  - GI may consider resuming PO Steroid if diarrhea is stable

## 2023-05-02 ENCOUNTER — TRANSCRIPTION ENCOUNTER (OUTPATIENT)
Age: 73
End: 2023-05-02

## 2023-05-02 VITALS
HEART RATE: 90 BPM | OXYGEN SATURATION: 100 % | SYSTOLIC BLOOD PRESSURE: 101 MMHG | DIASTOLIC BLOOD PRESSURE: 69 MMHG | RESPIRATION RATE: 18 BRPM | TEMPERATURE: 98 F

## 2023-05-02 LAB
ANION GAP SERPL CALC-SCNC: 11 MMOL/L — SIGNIFICANT CHANGE UP (ref 7–14)
BUN SERPL-MCNC: 10 MG/DL — SIGNIFICANT CHANGE UP (ref 7–23)
CALCIUM SERPL-MCNC: 9.6 MG/DL — SIGNIFICANT CHANGE UP (ref 8.4–10.5)
CALPROTECTIN STL-MCNT: 1510 UG/G — HIGH (ref 0–120)
CHLORIDE SERPL-SCNC: 106 MMOL/L — SIGNIFICANT CHANGE UP (ref 98–107)
CO2 SERPL-SCNC: 22 MMOL/L — SIGNIFICANT CHANGE UP (ref 22–31)
CREAT SERPL-MCNC: 0.69 MG/DL — SIGNIFICANT CHANGE UP (ref 0.5–1.3)
CRP SERPL-MCNC: 6.3 MG/L — HIGH
EGFR: 91 ML/MIN/1.73M2 — SIGNIFICANT CHANGE UP
ERYTHROCYTE [SEDIMENTATION RATE] IN BLOOD: 36 MM/HR — HIGH (ref 4–25)
GLUCOSE SERPL-MCNC: 93 MG/DL — SIGNIFICANT CHANGE UP (ref 70–99)
HCT VFR BLD CALC: 30.3 % — LOW (ref 34.5–45)
HGB BLD-MCNC: 9.8 G/DL — LOW (ref 11.5–15.5)
MAGNESIUM SERPL-MCNC: 2.1 MG/DL — SIGNIFICANT CHANGE UP (ref 1.6–2.6)
MCHC RBC-ENTMCNC: 27.6 PG — SIGNIFICANT CHANGE UP (ref 27–34)
MCHC RBC-ENTMCNC: 32.3 GM/DL — SIGNIFICANT CHANGE UP (ref 32–36)
MCV RBC AUTO: 85.4 FL — SIGNIFICANT CHANGE UP (ref 80–100)
NRBC # BLD: 0 /100 WBCS — SIGNIFICANT CHANGE UP (ref 0–0)
NRBC # FLD: 0 K/UL — SIGNIFICANT CHANGE UP (ref 0–0)
PHOSPHATE SERPL-MCNC: 3.2 MG/DL — SIGNIFICANT CHANGE UP (ref 2.5–4.5)
PLATELET # BLD AUTO: 371 K/UL — SIGNIFICANT CHANGE UP (ref 150–400)
POTASSIUM SERPL-MCNC: 3.8 MMOL/L — SIGNIFICANT CHANGE UP (ref 3.5–5.3)
POTASSIUM SERPL-SCNC: 3.8 MMOL/L — SIGNIFICANT CHANGE UP (ref 3.5–5.3)
RBC # BLD: 3.55 M/UL — LOW (ref 3.8–5.2)
RBC # FLD: 15.3 % — HIGH (ref 10.3–14.5)
SODIUM SERPL-SCNC: 139 MMOL/L — SIGNIFICANT CHANGE UP (ref 135–145)
WBC # BLD: 7.32 K/UL — SIGNIFICANT CHANGE UP (ref 3.8–10.5)
WBC # FLD AUTO: 7.32 K/UL — SIGNIFICANT CHANGE UP (ref 3.8–10.5)

## 2023-05-02 PROCEDURE — 99231 SBSQ HOSP IP/OBS SF/LOW 25: CPT | Mod: GC

## 2023-05-02 PROCEDURE — 99233 SBSQ HOSP IP/OBS HIGH 50: CPT

## 2023-05-02 RX ORDER — VANCOMYCIN HCL 1 G
5 VIAL (EA) INTRAVENOUS
Qty: 1 | Refills: 0
Start: 2023-05-02 | End: 2023-05-09

## 2023-05-02 RX ORDER — MELOXICAM 15 MG/1
1 TABLET ORAL
Refills: 0 | DISCHARGE

## 2023-05-02 RX ADMIN — Medication 650 MILLIGRAM(S): at 12:25

## 2023-05-02 RX ADMIN — Medication 125 MILLIGRAM(S): at 00:00

## 2023-05-02 RX ADMIN — ENOXAPARIN SODIUM 40 MILLIGRAM(S): 100 INJECTION SUBCUTANEOUS at 11:21

## 2023-05-02 RX ADMIN — AMLODIPINE BESYLATE 2.5 MILLIGRAM(S): 2.5 TABLET ORAL at 06:33

## 2023-05-02 RX ADMIN — Medication 125 MILLIGRAM(S): at 11:21

## 2023-05-02 RX ADMIN — Medication 125 MILLIGRAM(S): at 17:22

## 2023-05-02 RX ADMIN — Medication 81 MILLIGRAM(S): at 11:22

## 2023-05-02 RX ADMIN — Medication 125 MILLIGRAM(S): at 06:33

## 2023-05-02 RX ADMIN — Medication 650 MILLIGRAM(S): at 11:27

## 2023-05-02 RX ADMIN — LEVETIRACETAM 500 MILLIGRAM(S): 250 TABLET, FILM COATED ORAL at 06:34

## 2023-05-02 RX ADMIN — LEVETIRACETAM 500 MILLIGRAM(S): 250 TABLET, FILM COATED ORAL at 17:23

## 2023-05-02 NOTE — PROGRESS NOTE ADULT - PROBLEM SELECTOR PROBLEM 2
Chest pain
Acute ulcerative colitis
Acute ulcerative colitis
Chest pain
Chest pain
Acute ulcerative colitis
6
C. difficile colitis

## 2023-05-02 NOTE — PROGRESS NOTE ADULT - PROBLEM SELECTOR PLAN 5
BP stable  - c/w amlodipine  - monitor vital signs
reports episodes of AMS for 1-2 seconds, has been evaluated by her neurologist with an EEG that did not show acute findings as per patient  - neuro recs appreciated - c/w Keppra
BP stable  - c/w amlodipine  - monitor vital signs
reports episodes of AMS for 1-2 seconds, has been evaluated by her neurologist with an EEG that did not show acute findings as per patient  - neuro recs appreciated - c/w Keppra
reports episodes of AMS for 1-2 seconds, has been evaluated by her neurologist with an EEG that did not show acute findings as per patient  - neuro recs appreciated - c/w Keppra
BP stable  - c/w amlodipine  - monitor vital signs
reports episodes of AMS for 1-2 seconds, has been evaluated by her neurologist with an EEG that did not show acute findings as per patient  - neuro recs appreciated - c/w Keppra

## 2023-05-02 NOTE — PROGRESS NOTE ADULT - ATTENDING COMMENTS
# Acute diarrhea and hematochezia, colitis likely 2/2 UC flare in setting of CDI. Symptoms continue to improve with PO vanc  # C difficile infection: Started on PO vancomycin 4/27  # Ulcerative colitis: Diagnosed > 10 years ago, currently on 5ASA and follows with Dr. Susana Corbett as outpatient    --Continue PO vanc with plans to complete 10 day course  --Continue to trend inflammatory markers  --Defer steroid initiation at this time as patient appears to be improving with C diff treatment. Can reconsider as outpatient if symptoms plateau  --OK to continue home mesalamine  --May benefit from initiation of biologic therapy as outpatient   --DVT prophylaxis   --Will need close follow up after discharge with established GI provider    Additional recommendations as above.
# Acute diarrhea and hematochezia, colitis likely 2/2 UC flare in setting of CDI. Symptoms improved with PO vanc, but have not yet resolved. Inflammatory markers, including CRP, downtrending  # C difficile infection: Started on PO vancomycin 4/27  # Ulcerative colitis: Diagnosed > 10 years ago, currently on 5ASA and follows with Dr. Susana Corbett as outpatient    --Continue PO vanc with plans to complete 10 day course  --Continue to trend inflammatory markers  --If symptoms appear to have plateaued after 72h of C diff treatment, may consider PO prednisone course, which will be tapered and completed as outpatient  --May benefit from initiation of biologic therapy as outpatient   --DVT prophylaxis     Additional recommendations as above.
Overall, some clinical improvement from admission. Today as of 6 PM has had 2 loose bowel movements today so far. Mild abdominal discomfort. Main complaint is foul-smelling stools. Denies fever, nausea or vomiting. PE-comfortable/NAD/nontoxic-appearing. Abdomen-soft and nontender without mass or hepatosplenomegaly. Stool positive for Clostridium difficile disease.  IMP: Ulcerative colitis flare-complicated by Clostridium difficile. Difficult to know what is dominant process responsible for her current symptoms. Still suspect primarily ulcerative colitis.  REC:  -Monitor closely on current regimen of vancomycin 125 mg q.i.d.  -If shows significant clinical improvement over next 2-3 days then may indicate C. difficile is dominant problem. Otherwise, if issues of diarrhea bloody stools persist will initiate further treatment for ulcerative colitis (short course corticosteroids) followed by assessment for biologic therapy option). Of note, patient is followed by outpatient GI and likely defer to outpatient practitioner as to treatment choice.  -Low residue diet.  -DVT prophylaxis.  -Monitor serial CBC, BMP, CRP and periodic fecal calprotectin.  -No NSAIDs.  -Avoid opiates.
Assessment/plan as noted. Discussed with  who was at bedside. Probable severe  UC flare. Following submission of stool for C. difficile initiate regimen methylprednisolone 20 mg IV every 8 hours. Low-residue diet. Daily CBC and CRP. Monitor periodic fecal calprotectin. DVT prophylaxis.

## 2023-05-02 NOTE — PROGRESS NOTE ADULT - PROBLEM SELECTOR PLAN 7
DVT ppx: lovenox  DIET: Low residue diet  DISPO: PT eval -> OP PT

## 2023-05-02 NOTE — PROGRESS NOTE ADULT - PROBLEM SELECTOR PLAN 2
- Follows OP with GI, Dr. Susana Corbett on [facility].  - s/p flex sig 4/26: diffuse moderate mucosal changes were found in the rectum secondary to left-sided ulcerative colitis (biopsied). Hematochezia and diarrhea likely due to ulcerative colitis  - appreciate GI recs s/p 2 doses of IV methylprednisolone - held after C. diff colitis diagnosed.   - pain control: PRN Tylenol, avoid NSAIDs/opiates per GI  - GI holding off steroid while being treated for C. diff colitis.

## 2023-05-02 NOTE — PROGRESS NOTE ADULT - PROBLEM SELECTOR PROBLEM 6
Need for prophylactic measure
Essential hypertension
Essential hypertension
Need for prophylactic measure
Essential hypertension
Essential hypertension
Need for prophylactic measure

## 2023-05-02 NOTE — PROGRESS NOTE ADULT - SUBJECTIVE AND OBJECTIVE BOX
Gastroenterology/Hepatology Progress Note      Interval Events:     Pt. positive for c. diff, reported she feels better from admission when she had a bowel movement every 10 minutes, now has 3-4 loose bloody stools per day, overnight, she woke up 3 times to have a BM. Has mild abd cramping but no nausea or vomiting. no fevers or chills.         Allergies:  No Known Allergies    Hospital Medications:  acetaminophen     Tablet .. 650 milliGRAM(s) Oral every 6 hours PRN  aluminum hydroxide/magnesium hydroxide/simethicone Suspension 30 milliLiter(s) Oral every 4 hours PRN  amLODIPine   Tablet 2.5 milliGRAM(s) Oral daily  aspirin enteric coated 81 milliGRAM(s) Oral daily  enoxaparin Injectable 40 milliGRAM(s) SubCutaneous every 24 hours  levETIRAcetam 500 milliGRAM(s) Oral two times a day  melatonin 3 milliGRAM(s) Oral at bedtime PRN  mesalamine DR Capsule 2400 milliGRAM(s) Oral daily  ondansetron Injectable 4 milliGRAM(s) IV Push every 8 hours PRN  simvastatin 20 milliGRAM(s) Oral at bedtime  vancomycin    Solution 125 milliGRAM(s) Oral every 6 hours      ROS: 14 point ROS negative unless otherwise state in subjective    PHYSICAL EXAM:   Vital Signs:  Vital Signs Last 24 Hrs  T(C): 36.7 (28 Apr 2023 05:30), Max: 36.8 (27 Apr 2023 21:20)  T(F): 98.1 (28 Apr 2023 05:30), Max: 98.3 (27 Apr 2023 21:20)  HR: 71 (28 Apr 2023 05:30) (71 - 75)  BP: 129/86 (28 Apr 2023 05:30) (103/65 - 129/86)  BP(mean): --  RR: 18 (28 Apr 2023 05:30) (17 - 18)  SpO2: 99% (28 Apr 2023 05:30) (99% - 100%)    Parameters below as of 28 Apr 2023 05:30  Patient On (Oxygen Delivery Method): room air      Daily     Daily     PHYSICAL EXAM:     GENERAL:  No acute distress, lying in bed.   HEENT:  NCAT, no scleral icterus   CHEST:  no respiratory distress  ABDOMEN:  Soft, non tender, non distended, no palpable masses.   EXTREMITIES: No LE edema  NEURO:  Alert and oriented x 3, no tremors.     LABS:                        11.3   4.92  )-----------( 438      ( 28 Apr 2023 05:00 )             35.4     Mean Cell Volume: 83.3 fL (04-28-23 @ 05:00)    04-28    137  |  103  |  7   ----------------------------<  143<H>  4.2   |  23  |  0.67    Ca    10.4      28 Apr 2023 05:00  Phos  3.3     04-28  Mg     2.20     04-28                  Imaging:        Findings:      Hemorrhoids were found on perianal exam.       Diffuse moderate to severe mucosal changes characterized by congestion        (edema), erythema, friability and shallow ulcerations were found in the        rectum, in the sigmoid colon and in the descending colon. Biopsies were        taken with a cold forceps for histology from the descending colon,        sigmoid colon and the rectum.                                                                                   Impression:          - Hemorrhoids found on perianal exam.                       - Diffuse moderate mucosal changes were found in the                        rectum secondary to left-sided ulcerative colitis.                        Biopsied.                       - Hematochezia and diarrhea likely due to ulcerative                        colitis, however, will need to r/o potential infection.        
Gastroenterology/Hepatology Progress Note      Interval Events:     Pt. reported she has been feeling better, has 3-4 loose watery stools over the last 24 hours w/ minimal blood. Denied nausea, vomiting, fevers and chills. Tolerating po diet well.     Allergies:  No Known Allergies      Hospital Medications:  acetaminophen     Tablet .. 650 milliGRAM(s) Oral every 6 hours PRN  aluminum hydroxide/magnesium hydroxide/simethicone Suspension 30 milliLiter(s) Oral every 4 hours PRN  amLODIPine   Tablet 2.5 milliGRAM(s) Oral daily  aspirin enteric coated 81 milliGRAM(s) Oral daily  enoxaparin Injectable 40 milliGRAM(s) SubCutaneous every 24 hours  fluticasone propionate 50 MICROgram(s)/spray Nasal Spray 1 Spray(s) Both Nostrils <User Schedule> PRN  levETIRAcetam 500 milliGRAM(s) Oral two times a day  melatonin 3 milliGRAM(s) Oral at bedtime PRN  mesalamine DR Capsule 2400 milliGRAM(s) Oral daily  ondansetron Injectable 4 milliGRAM(s) IV Push every 8 hours PRN  simvastatin 20 milliGRAM(s) Oral at bedtime  vancomycin    Solution 125 milliGRAM(s) Oral every 6 hours      ROS: 14 point ROS negative unless otherwise state in subjective    PHYSICAL EXAM:   Vital Signs:  Vital Signs Last 24 Hrs  T(C): 36.5 (01 May 2023 05:19), Max: 36.9 (30 Apr 2023 22:49)  T(F): 97.7 (01 May 2023 05:19), Max: 98.5 (30 Apr 2023 22:49)  HR: 69 (01 May 2023 05:19) (68 - 86)  BP: 104/70 (01 May 2023 05:19) (103/78 - 106/68)  BP(mean): --  RR: 17 (01 May 2023 05:19) (17 - 18)  SpO2: 100% (01 May 2023 05:19) (99% - 100%)    Parameters below as of 01 May 2023 05:19  Patient On (Oxygen Delivery Method): room air      Daily     Daily     PHYSICAL EXAM:     GENERAL:  No acute distress, lying in bed.   HEENT:  NCAT, no scleral icterus   CHEST:  no respiratory distress  ABDOMEN:  Soft, non tender, non distended, no palpable masses.   EXTREMITIES: No LE edema  NEURO:  Alert and oriented x 3, no tremors.     LABS:                        10.3   9.13  )-----------( 384      ( 01 May 2023 06:30 )             32.0     Mean Cell Volume: 85.6 fL (05-01-23 @ 06:30)    05-01    140  |  105  |  11  ----------------------------<  89  4.1   |  22  |  0.79    Ca    9.8      01 May 2023 06:30  Phos  3.2     05-01  Mg     1.90     05-01                  Imaging:        Findings:      Hemorrhoids were found on perianal exam.       Diffuse moderate to severe mucosal changes characterized by congestion        (edema), erythema, friability and shallow ulcerations were found in the        rectum, in the sigmoid colon and in the descending colon. Biopsies were        taken with a cold forceps for histology from the descending colon,        sigmoid colon and the rectum.                                                                                   Impression:          - Hemorrhoids found on perianal exam.                       - Diffuse moderate mucosal changes were found in the                        rectum secondary to left-sided ulcerative colitis.                        Biopsied.                       - Hematochezia and diarrhea likely due to ulcerative                        colitis, however, will need to r/o potential infection.                
Gastroenterology/Hepatology Progress Note      Interval Events:     This morning, patient continues to have loose watery stools, persistent symptoms of crampy lower abd pain, but no nausea, vomiting, fevers or chills.     Allergies:  No Known Allergies      Hospital Medications:  acetaminophen     Tablet .. 650 milliGRAM(s) Oral every 6 hours PRN  aluminum hydroxide/magnesium hydroxide/simethicone Suspension 30 milliLiter(s) Oral every 4 hours PRN  amLODIPine   Tablet 2.5 milliGRAM(s) Oral daily  aspirin enteric coated 81 milliGRAM(s) Oral daily  enoxaparin Injectable 40 milliGRAM(s) SubCutaneous every 24 hours  levETIRAcetam 500 milliGRAM(s) Oral two times a day  melatonin 3 milliGRAM(s) Oral at bedtime PRN  mesalamine DR Capsule 2400 milliGRAM(s) Oral daily  ondansetron Injectable 4 milliGRAM(s) IV Push every 8 hours PRN  simvastatin 20 milliGRAM(s) Oral at bedtime      ROS: 14 point ROS negative unless otherwise state in subjective    PHYSICAL EXAM:   Vital Signs:  Vital Signs Last 24 Hrs  T(C): 36.8 (27 Apr 2023 05:07), Max: 36.8 (27 Apr 2023 05:07)  T(F): 98.2 (27 Apr 2023 05:07), Max: 98.2 (27 Apr 2023 05:07)  HR: 81 (27 Apr 2023 05:07) (71 - 89)  BP: 104/62 (27 Apr 2023 05:07) (100/67 - 133/77)  BP(mean): --  RR: 18 (27 Apr 2023 05:07) (15 - 18)  SpO2: 100% (27 Apr 2023 05:07) (95% - 100%)    Parameters below as of 27 Apr 2023 05:07  Patient On (Oxygen Delivery Method): room air      Daily Height in cm: 162.56 (26 Apr 2023 10:41)    Daily     PHYSICAL EXAM:     GENERAL:  No acute distress, lying in bed.   HEENT:  NCAT, no scleral icterus   CHEST:  no respiratory distress  ABDOMEN:  Soft, mild tenderness in the lower abdomen w/ no guarding and rebound tenderness, non distended, no masses (unchanged)  EXTREMITIES: No LE edema  NEURO:  Alert and oriented x 3, no tremors.     LABS:                        10.5   4.73  )-----------( 405      ( 27 Apr 2023 05:05 )             32.3     Mean Cell Volume: 82.2 fL (04-27-23 @ 05:05)    04-27    139  |  104  |  10  ----------------------------<  97  3.9   |  22  |  0.70    Ca    10.0      27 Apr 2023 05:05  Phos  2.8     04-27  Mg     2.10     04-27        Imaging:      Findings:      Hemorrhoids were found on perianal exam.       Diffuse moderate to severe mucosal changes characterized by congestion        (edema), erythema, friability and shallow ulcerations were found in the        rectum, in the sigmoid colon and in the descending colon. Biopsies were        taken with a cold forceps for histology from the descending colon,        sigmoid colon and the rectum.                                                                                   Impression:          - Hemorrhoids found on perianal exam.                       - Diffuse moderate mucosal changes were found in the                        rectum secondary to left-sided ulcerative colitis.                        Biopsied.                       - Hematochezia and diarrhea likely due to ulcerative                        colitis, however, will need to r/o potential infection.      
Perlita Garrison MD  [facility] Division of Hospital Medicine  Pager [phone] (M-F 8AM-5PM)  Other Times: [phone]    Patient is a 73y old  Female who presents with a chief complaint of Diarrhea with hematochezia, ulcerative colitis flare (26 Apr 2023 09:55)    SUBJECTIVE / OVERNIGHT EVENTS: no acute events overnight    MEDICATIONS  (STANDING):  amLODIPine   Tablet 2.5 milliGRAM(s) Oral daily  aspirin enteric coated 81 milliGRAM(s) Oral daily  enoxaparin Injectable 40 milliGRAM(s) SubCutaneous every 24 hours  levETIRAcetam 500 milliGRAM(s) Oral two times a day  mesalamine DR Capsule 2400 milliGRAM(s) Oral daily  simvastatin 20 milliGRAM(s) Oral at bedtime    MEDICATIONS  (PRN):  acetaminophen     Tablet .. 650 milliGRAM(s) Oral every 6 hours PRN Temp greater or equal to 38C (100.4F), Mild Pain (1 - 3)  aluminum hydroxide/magnesium hydroxide/simethicone Suspension 30 milliLiter(s) Oral every 4 hours PRN Dyspepsia  melatonin 3 milliGRAM(s) Oral at bedtime PRN Insomnia  ondansetron Injectable 4 milliGRAM(s) IV Push every 8 hours PRN Nausea and/or Vomiting  oxyCODONE    IR 2.5 milliGRAM(s) Oral every 6 hours PRN Severe Pain (7 - 10)      PHYSICAL EXAM:  Vital Signs Last 24 Hrs  T(C): 36.2 (26 Apr 2023 10:41), Max: 36.6 (25 Apr 2023 20:50)  T(F): 97.1 (26 Apr 2023 10:41), Max: 97.9 (25 Apr 2023 20:50)  HR: 81 (26 Apr 2023 10:41) (71 - 81)  BP: 106/76 (26 Apr 2023 10:41) (106/76 - 152/80)  BP(mean): --  RR: 15 (26 Apr 2023 10:41) (15 - 18)  SpO2: 99% (26 Apr 2023 10:41) (99% - 100%)    Parameters below as of 26 Apr 2023 10:41  Patient On (Oxygen Delivery Method): room air        CONSTITUTIONAL: NAD, well-developed, well-groomed  RESPIRATORY: Normal respiratory effort; lungs are clear to auscultation bilaterally  CARDIOVASCULAR: Regular rate and rhythm, normal S1 and S2, no murmur/rub/gallop; No lower extremity edema  GASTROINTESTINAL: Nontender to palpation, normoactive bowel sounds, no rebound/guarding; No hepatosplenomegaly  MUSCULOSKELETAL:  no clubbing or cyanosis of digits; no joint swelling or tenderness to palpation  NEUROLOGY: non-focal; no gross sensory deficits   PSYCH: A+O to person, place, and time; affect appropriate  SKIN: No rashes; warm     LABS:                        10.8   4.54  )-----------( 406      ( 26 Apr 2023 06:50 )             33.7     04-26    139  |  104  |  4<L>  ----------------------------<  88  3.7   |  21<L>  |  0.66    Ca    10.2      26 Apr 2023 06:50  Phos  3.3     04-25  Mg     2.00     04-25    TPro  6.6  /  Alb  3.6  /  TBili  <0.2  /  DBili  x   /  AST  21  /  ALT  28  /  AlkPhos  94  04-25    PT/INR - ( 24 Apr 2023 14:30 )   PT: 13.2 sec;   INR: 1.14 ratio         PTT - ( 24 Apr 2023 14:30 )  PTT:38.7 sec            RADIOLOGY & ADDITIONAL TESTS:  Results Reviewed:   Imaging Personally Reviewed:  Electrocardiogram Personally Reviewed:    COORDINATION OF CARE:  Care Discussed with Consultants/Other Providers [Y/N]:  Prior or Outpatient Records Reviewed [Y/N]:  
[facility] Division of Hospital Medicine  Yaya Ellis MD  Pager (RAYMOND-F, 8A-5P): [phone]  Other Times:  [phone]    Patient is a 73y old  Female who presents with a chief complaint of Diarrhea with hematochezia, ulcerative colitis flare (01 May 2023 10:37)    SUBJECTIVE / OVERNIGHT EVENTS:  3 episodes of diarrhea overnight. watery and pasty in quality. MInimal abdominal pain. No F/C, N/V, CP, SOB, Cough, lightheadedness, dizziness, abdominal pain, dysuria.    MEDICATIONS  (STANDING):  amLODIPine   Tablet 2.5 milliGRAM(s) Oral daily  aspirin enteric coated 81 milliGRAM(s) Oral daily  enoxaparin Injectable 40 milliGRAM(s) SubCutaneous every 24 hours  levETIRAcetam 500 milliGRAM(s) Oral two times a day  mesalamine DR Capsule 2400 milliGRAM(s) Oral daily  simvastatin 20 milliGRAM(s) Oral at bedtime  vancomycin    Solution 125 milliGRAM(s) Oral every 6 hours    MEDICATIONS  (PRN):  acetaminophen     Tablet .. 650 milliGRAM(s) Oral every 6 hours PRN Temp greater or equal to 38C (100.4F), Mild Pain (1 - 3), Moderate Pain (4 - 6)  aluminum hydroxide/magnesium hydroxide/simethicone Suspension 30 milliLiter(s) Oral every 4 hours PRN Dyspepsia  fluticasone propionate 50 MICROgram(s)/spray Nasal Spray 1 Spray(s) Both Nostrils <User Schedule> PRN congestion  melatonin 3 milliGRAM(s) Oral at bedtime PRN Insomnia  ondansetron Injectable 4 milliGRAM(s) IV Push every 8 hours PRN Nausea and/or Vomiting      Vital Signs Last 24 Hrs  T(C): 36.6 (01 May 2023 11:20), Max: 36.9 (30 Apr 2023 22:49)  T(F): 97.9 (01 May 2023 11:20), Max: 98.5 (30 Apr 2023 22:49)  HR: 82 (01 May 2023 11:20) (69 - 86)  BP: 106/62 (01 May 2023 11:20) (103/78 - 106/62)  BP(mean): --  RR: 18 (01 May 2023 11:20) (17 - 18)  SpO2: 100% (01 May 2023 11:20) (100% - 100%)    Parameters below as of 01 May 2023 11:20  Patient On (Oxygen Delivery Method): room air      CAPILLARY BLOOD GLUCOSE        I&O's Summary    30 Apr 2023 07:01  -  01 May 2023 07:00  --------------------------------------------------------  IN: 840 mL / OUT: 0 mL / NET: 840 mL    01 May 2023 07:01  -  01 May 2023 11:38  --------------------------------------------------------  IN: 240 mL / OUT: 0 mL / NET: 240 mL        PHYSICAL EXAM:  CONSTITUTIONAL: NAD  EYES: PERRLA; conjunctiva and sclera clear  ENMT: Moist oral mucosa, no pharyngeal injection or exudates; normal dentition  NECK: Supple, no palpable masses; no thyromegaly  RESPIRATORY: Normal respiratory effort; lungs are clear to auscultation bilaterally  CARDIOVASCULAR: Regular rate and rhythm, normal S1 and S2, no murmur/rub/gallop; No lower extremity edema; Peripheral pulses are 2+ bilaterally  ABDOMEN: Nontender to palpation, normoactive bowel sounds, no rebound/guarding; No hepatosplenomegaly  MUSCULOSKELETAL:  Normal gait; no clubbing or cyanosis of digits; no joint swelling or tenderness to palpation  PSYCH: A+O to person, place, and time; affect appropriate  NEUROLOGY: CN 2-12 are intact and symmetric; no gross sensory deficits   SKIN: No rashes; no palpable lesions    LABS:                        10.3   9.13  )-----------( 384      ( 01 May 2023 06:30 )             32.0     05-01    140  |  105  |  11  ----------------------------<  89  4.1   |  22  |  0.79    Ca    9.8      01 May 2023 06:30  Phos  3.2     05-01  Mg     1.90     05-01                RADIOLOGY & ADDITIONAL TESTS:    Imaging Personally Reviewed:    Care Discussed with Consultants/Other Providers:  
Gastroenterology/Hepatology Progress Note      Interval Events:     No acute events overnight. Pt. feels well overall, had 2 loose watery stools with blood overnight. Denied abd pain, nausea, vomiting, fevers and chills.     Allergies:  No Known Allergies      Hospital Medications:  acetaminophen     Tablet .. 650 milliGRAM(s) Oral every 6 hours PRN  aluminum hydroxide/magnesium hydroxide/simethicone Suspension 30 milliLiter(s) Oral every 4 hours PRN  amLODIPine   Tablet 2.5 milliGRAM(s) Oral daily  aspirin enteric coated 81 milliGRAM(s) Oral daily  enoxaparin Injectable 40 milliGRAM(s) SubCutaneous every 24 hours  fluticasone propionate 50 MICROgram(s)/spray Nasal Spray 1 Spray(s) Both Nostrils <User Schedule> PRN  levETIRAcetam 500 milliGRAM(s) Oral two times a day  melatonin 3 milliGRAM(s) Oral at bedtime PRN  mesalamine DR Capsule 2400 milliGRAM(s) Oral daily  ondansetron Injectable 4 milliGRAM(s) IV Push every 8 hours PRN  simvastatin 20 milliGRAM(s) Oral at bedtime  vancomycin    Solution 125 milliGRAM(s) Oral every 6 hours      ROS: 14 point ROS negative unless otherwise state in subjective    PHYSICAL EXAM:   Vital Signs:  Vital Signs Last 24 Hrs  T(C): 36.8 (02 May 2023 06:00), Max: 37 (01 May 2023 21:20)  T(F): 98.2 (02 May 2023 06:00), Max: 98.6 (01 May 2023 21:20)  HR: 72 (02 May 2023 06:00) (72 - 87)  BP: 112/72 (02 May 2023 06:00) (95/57 - 112/72)  BP(mean): --  RR: 17 (02 May 2023 06:00) (17 - 18)  SpO2: 100% (02 May 2023 06:00) (100% - 100%)    Parameters below as of 02 May 2023 06:00  Patient On (Oxygen Delivery Method): room air      Daily     Daily     PHYSICAL EXAM:     GENERAL:  No acute distress, lying in bed.   HEENT:  NCAT, no scleral icterus   CHEST:  no respiratory distress  ABDOMEN:  Soft, non tender, non distended, no palpable masses.   EXTREMITIES: No LE edema  NEURO:  Alert and oriented x 3, no tremors.     LABS:                        9.8    7.32  )-----------( 371      ( 02 May 2023 05:30 )             30.3     Mean Cell Volume: 85.4 fL (05-02-23 @ 05:30)    05-02    139  |  106  |  10  ----------------------------<  93  3.8   |  22  |  0.69    Ca    9.6      02 May 2023 05:30  Phos  3.2     05-02  Mg     2.10     05-02                  Imaging:        Findings:      Hemorrhoids were found on perianal exam.       Diffuse moderate to severe mucosal changes characterized by congestion        (edema), erythema, friability and shallow ulcerations were found in the        rectum, in the sigmoid colon and in the descending colon. Biopsies were        taken with a cold forceps for histology from the descending colon,        sigmoid colon and the rectum.                                                                                   Impression:          - Hemorrhoids found on perianal exam.                       - Diffuse moderate mucosal changes were found in the                        rectum secondary to left-sided ulcerative colitis.                        Biopsied.                       - Hematochezia and diarrhea likely due to ulcerative                        colitis, however, will need to r/o potential infection.        
ANESTHESIA POSTOP CHECK    73y Female POSTOP DAY 1     No COMPLAINTS    NO APPARENT ANESTHESIA COMPLICATIONS      
Patient is a 73y old  Female who presents with a chief complaint of Diarrhea with hematochezia, ulcerative colitis flare (29 Apr 2023 11:36)    Simone Pratt MD   [facility]   Pager [phone]  Reachable on [employer] Teams     SUBJECTIVE / OVERNIGHT EVENTS:  Patient seen and examined this morning. States that she had mild abd pain that was controlled with dose of tylenol.   Continue to have small streaks of bright red blood in stool.   She denies any fevers, chills, nasuea, vomiting.     MEDICATIONS  (STANDING):  amLODIPine   Tablet 2.5 milliGRAM(s) Oral daily  aspirin enteric coated 81 milliGRAM(s) Oral daily  enoxaparin Injectable 40 milliGRAM(s) SubCutaneous every 24 hours  levETIRAcetam 500 milliGRAM(s) Oral two times a day  mesalamine DR Capsule 2400 milliGRAM(s) Oral daily  simvastatin 20 milliGRAM(s) Oral at bedtime  vancomycin    Solution 125 milliGRAM(s) Oral every 6 hours    MEDICATIONS  (PRN):  acetaminophen     Tablet .. 650 milliGRAM(s) Oral every 6 hours PRN Temp greater or equal to 38C (100.4F), Mild Pain (1 - 3), Moderate Pain (4 - 6)  aluminum hydroxide/magnesium hydroxide/simethicone Suspension 30 milliLiter(s) Oral every 4 hours PRN Dyspepsia  fluticasone propionate 50 MICROgram(s)/spray Nasal Spray 1 Spray(s) Both Nostrils <User Schedule> PRN congestion  melatonin 3 milliGRAM(s) Oral at bedtime PRN Insomnia  ondansetron Injectable 4 milliGRAM(s) IV Push every 8 hours PRN Nausea and/or Vomiting      Vital Signs Last 24 Hrs  T(C): 36.8 (30 Apr 2023 11:30), Max: 37.2 (30 Apr 2023 05:25)  T(F): 98.2 (30 Apr 2023 11:30), Max: 98.9 (30 Apr 2023 05:25)  HR: 68 (30 Apr 2023 11:30) (66 - 74)  BP: 106/68 (30 Apr 2023 11:30) (105/69 - 117/78)  BP(mean): --  RR: 18 (30 Apr 2023 11:30) (17 - 18)  SpO2: 99% (30 Apr 2023 11:30) (99% - 100%)  CAPILLARY BLOOD GLUCOSE        I&O's Summary    29 Apr 2023 07:01  -  30 Apr 2023 07:00  --------------------------------------------------------  IN: 600 mL / OUT: 0 mL / NET: 600 mL    30 Apr 2023 07:01  -  30 Apr 2023 14:53  --------------------------------------------------------  IN: 600 mL / OUT: 0 mL / NET: 600 mL        General: middle aged woman sitting up in bed in NAD, awake and alert  HENMT: MMM  Respiratory: No respiratory distress, CTABL, No rales, rhonchi, wheezing.  Cardiovascular: S1,S2; Regular rate and rhythm; No m/g/r.  Gastrointestinal: Soft, Nontender, Nondistended; +BS.   Extremities: No c/c/e; warm to touch  Psych: appropriate mood and affect    LABS:                        9.9    7.00  )-----------( 382      ( 30 Apr 2023 05:00 )             30.4     04-30    139  |  105  |  12  ----------------------------<  85  3.9   |  22  |  0.71    Ca    9.7      30 Apr 2023 05:00  Phos  3.0     04-30  Mg     1.90     04-30                RADIOLOGY & ADDITIONAL TESTS:    Imaging Personally Reviewed:    Consultant(s) Notes Reviewed:      Care Discussed with Consultants/Other Providers:  
Patient is a 73y old  Female who presents with a chief complaint of Diarrhea with hematochezia, ulcerative colitis flare (28 Apr 2023 08:38)    Simone Pratt MD   [facility] Division of Hospital Medicine   Pager [phone]  Reachable on [employer] Teams     SUBJECTIVE / OVERNIGHT EVENTS:  Patient seen and examined this morning. C. diff returned back positive overnight and started on oral vanco.  She reports that her abdominal pain is completely resolved.   Has had some streaks of blood in stool that she states is also improving.   NO chest pain, shortness of breath, nausea or vomiting. No diarrhea.      MEDICATIONS  (STANDING):  amLODIPine   Tablet 2.5 milliGRAM(s) Oral daily  aspirin enteric coated 81 milliGRAM(s) Oral daily  enoxaparin Injectable 40 milliGRAM(s) SubCutaneous every 24 hours  levETIRAcetam 500 milliGRAM(s) Oral two times a day  mesalamine DR Capsule 2400 milliGRAM(s) Oral daily  simvastatin 20 milliGRAM(s) Oral at bedtime  vancomycin    Solution 125 milliGRAM(s) Oral every 6 hours    MEDICATIONS  (PRN):  acetaminophen     Tablet .. 650 milliGRAM(s) Oral every 6 hours PRN Temp greater or equal to 38C (100.4F), Mild Pain (1 - 3), Moderate Pain (4 - 6)  aluminum hydroxide/magnesium hydroxide/simethicone Suspension 30 milliLiter(s) Oral every 4 hours PRN Dyspepsia  melatonin 3 milliGRAM(s) Oral at bedtime PRN Insomnia  ondansetron Injectable 4 milliGRAM(s) IV Push every 8 hours PRN Nausea and/or Vomiting      Vital Signs Last 24 Hrs  T(C): 36.7 (28 Apr 2023 05:30), Max: 36.8 (27 Apr 2023 21:20)  T(F): 98.1 (28 Apr 2023 05:30), Max: 98.3 (27 Apr 2023 21:20)  HR: 71 (28 Apr 2023 05:30) (71 - 75)  BP: 129/86 (28 Apr 2023 05:30) (103/65 - 129/86)  BP(mean): --  RR: 18 (28 Apr 2023 05:30) (17 - 18)  SpO2: 99% (28 Apr 2023 05:30) (99% - 100%)  CAPILLARY BLOOD GLUCOSE        I&O's Summary      General: middle aged woman sitting up in bed in NAD, awake and alert  Eyes: conjunctiva clear, nonicteric sclera  HENMT: MMM  Respiratory: No respiratory distress, CTABL, No rales, rhonchi, wheezing.  Cardiovascular: S1,S2; Regular rate and rhythm; No m/g/r.  Gastrointestinal: Soft, Nontender, Nondistended; +BS.   Extremities: No c/c/e; warm to touch  Psych: appropriate mood and affect    LABS:                        11.3   4.92  )-----------( 438      ( 28 Apr 2023 05:00 )             35.4     04-28    137  |  103  |  7   ----------------------------<  143<H>  4.2   |  23  |  0.67    Ca    10.4      28 Apr 2023 05:00  Phos  3.3     04-28  Mg     2.20     04-28                RADIOLOGY & ADDITIONAL TESTS:    Imaging Personally Reviewed:    Consultant(s) Notes Reviewed:      Care Discussed with Consultants/Other Providers:  
Patient is a 73y old  Female who presents with a chief complaint of Diarrhea with hematochezia, ulcerative colitis flare (28 Apr 2023 11:38)    Simone Pratt MD   [facility] Division of Hospital Medicine   Pager [phone]  Reachable on Microsoft Teams     SUBJECTIVE / OVERNIGHT EVENTS:  Patient seen and examined this morning. states that she has had one more episode of blood streaks in her stool  pain slightly returned overnight, but overall has improved. Pain went away with tylenol.     MEDICATIONS  (STANDING):  amLODIPine   Tablet 2.5 milliGRAM(s) Oral daily  aspirin enteric coated 81 milliGRAM(s) Oral daily  enoxaparin Injectable 40 milliGRAM(s) SubCutaneous every 24 hours  levETIRAcetam 500 milliGRAM(s) Oral two times a day  mesalamine DR Capsule 2400 milliGRAM(s) Oral daily  simvastatin 20 milliGRAM(s) Oral at bedtime  vancomycin    Solution 125 milliGRAM(s) Oral every 6 hours    MEDICATIONS  (PRN):  acetaminophen     Tablet .. 650 milliGRAM(s) Oral every 6 hours PRN Temp greater or equal to 38C (100.4F), Mild Pain (1 - 3), Moderate Pain (4 - 6)  aluminum hydroxide/magnesium hydroxide/simethicone Suspension 30 milliLiter(s) Oral every 4 hours PRN Dyspepsia  melatonin 3 milliGRAM(s) Oral at bedtime PRN Insomnia  ondansetron Injectable 4 milliGRAM(s) IV Push every 8 hours PRN Nausea and/or Vomiting      Vital Signs Last 24 Hrs  T(C): 36.8 (29 Apr 2023 05:50), Max: 36.8 (28 Apr 2023 21:50)  T(F): 98.2 (29 Apr 2023 05:50), Max: 98.3 (28 Apr 2023 21:50)  HR: 65 (29 Apr 2023 05:50) (65 - 80)  BP: 129/75 (29 Apr 2023 05:50) (110/66 - 129/75)  BP(mean): --  RR: 16 (29 Apr 2023 05:50) (16 - 18)  SpO2: 100% (29 Apr 2023 05:50) (100% - 100%)  CAPILLARY BLOOD GLUCOSE        I&O's Summary    29 Apr 2023 07:01  -  29 Apr 2023 11:36  --------------------------------------------------------  IN: 240 mL / OUT: 0 mL / NET: 240 mL        General: NAD, awake and alert  Eyes: conjunctiva clear, nonicteric sclera  HENMT: NCAT, MMM  Neck: Supple, trachea midline   Respiratory: No respiratory distress, CTABL, No rales, rhonchi, wheezing.  Cardiovascular: S1,S2; Regular rate and rhythm; No m/g/r. 2+ peripheral pulses  Gastrointestinal: Soft, Nontender, Nondistended; +BS.   Extremities: No c/c/e; warm to touch  Neurological: Moving all 4 extremities; Sensation to LT grossly in tact.  Skin: No rashes, No erythema   Psych: AAOx3; appropriate mood and affect    LABS:                        10.7   8.55  )-----------( 397      ( 29 Apr 2023 04:40 )             32.7     04-29    139  |  103  |  19  ----------------------------<  101<H>  4.0   |  22  |  0.74    Ca    10.2      29 Apr 2023 04:40  Phos  3.0     04-29  Mg     2.00     04-29                RADIOLOGY & ADDITIONAL TESTS:    Imaging Personally Reviewed:    Consultant(s) Notes Reviewed:      Care Discussed with Consultants/Other Providers:  
Perlita Garrison MD  [facility] Division of Hospital Medicine  Pager [phone] (M-F 8AM-5PM)  Other Times: [phone]    Patient is a 73y old  Female who presents with a chief complaint of Diarrhea     (26 Apr 2023 14:11)    SUBJECTIVE / OVERNIGHT EVENTS: no acute events overnight    MEDICATIONS  (STANDING):  amLODIPine   Tablet 2.5 milliGRAM(s) Oral daily  aspirin enteric coated 81 milliGRAM(s) Oral daily  enoxaparin Injectable 40 milliGRAM(s) SubCutaneous every 24 hours  levETIRAcetam 500 milliGRAM(s) Oral two times a day  mesalamine DR Capsule 2400 milliGRAM(s) Oral daily  simvastatin 20 milliGRAM(s) Oral at bedtime    MEDICATIONS  (PRN):  acetaminophen     Tablet .. 650 milliGRAM(s) Oral every 6 hours PRN Temp greater or equal to 38C (100.4F), Mild Pain (1 - 3), Moderate Pain (4 - 6)  aluminum hydroxide/magnesium hydroxide/simethicone Suspension 30 milliLiter(s) Oral every 4 hours PRN Dyspepsia  melatonin 3 milliGRAM(s) Oral at bedtime PRN Insomnia  ondansetron Injectable 4 milliGRAM(s) IV Push every 8 hours PRN Nausea and/or Vomiting      PHYSICAL EXAM:  Vital Signs Last 24 Hrs  T(C): 36.8 (27 Apr 2023 05:07), Max: 36.8 (27 Apr 2023 05:07)  T(F): 98.2 (27 Apr 2023 05:07), Max: 98.2 (27 Apr 2023 05:07)  HR: 81 (27 Apr 2023 05:07) (71 - 89)  BP: 104/62 (27 Apr 2023 05:07) (100/67 - 133/77)  RR: 18 (27 Apr 2023 05:07) (15 - 18)  SpO2: 100% (27 Apr 2023 05:07) (95% - 100%)    Parameters below as of 27 Apr 2023 05:07  Patient On (Oxygen Delivery Method): room air    CONSTITUTIONAL: NAD, well-developed, well-groomed  RESPIRATORY: Normal respiratory effort; lungs are clear to auscultation bilaterally  CARDIOVASCULAR: Regular rate and rhythm, normal S1 and S2, no murmur/rub/gallop; No lower extremity edema  GASTROINTESTINAL: Nontender to palpation, normoactive bowel sounds, no rebound/guarding; No hepatosplenomegaly  MUSCULOSKELETAL:  no clubbing or cyanosis of digits; no joint swelling or tenderness to palpation  NEUROLOGY: non-focal; no gross sensory deficits   PSYCH: A+O to person, place, and time; affect appropriate  SKIN: No rashes; warm     LABS:                        10.5   4.73  )-----------( 405      ( 27 Apr 2023 05:05 )             32.3     04-27    139  |  104  |  10  ----------------------------<  97  3.9   |  22  |  0.70    Ca    10.0      27 Apr 2023 05:05  Phos  2.8     04-27  Mg     2.10     04-27                  RADIOLOGY & ADDITIONAL TESTS:  Results Reviewed:   Imaging Personally Reviewed:  Electrocardiogram Personally Reviewed:    COORDINATION OF CARE:  Care Discussed with Consultants/Other Providers [Y/N]:  Prior or Outpatient Records Reviewed [Y/N]:  
Perlita Garrison MD  [facility] Division of Hospital Medicine  Pager [phone] (M-F 8AM-5PM)  Other Times: [phone]    Patient is a 72y old  Female who presents with a chief complaint of Diarrhea with hematochezia, ulcerative colitis flare (25 Apr 2023 09:35)    SUBJECTIVE / OVERNIGHT EVENTS: reports abdominal pain, improved with pain medications     MEDICATIONS  (STANDING):  amLODIPine   Tablet 2.5 milliGRAM(s) Oral daily  aspirin enteric coated 81 milliGRAM(s) Oral daily  enoxaparin Injectable 40 milliGRAM(s) SubCutaneous every 24 hours  levETIRAcetam 500 milliGRAM(s) Oral two times a day  mesalamine DR Capsule 2400 milliGRAM(s) Oral daily  simvastatin 20 milliGRAM(s) Oral at bedtime    MEDICATIONS  (PRN):  acetaminophen     Tablet .. 650 milliGRAM(s) Oral every 6 hours PRN Temp greater or equal to 38C (100.4F), Mild Pain (1 - 3)  aluminum hydroxide/magnesium hydroxide/simethicone Suspension 30 milliLiter(s) Oral every 4 hours PRN Dyspepsia  melatonin 3 milliGRAM(s) Oral at bedtime PRN Insomnia  morphine  - Injectable 2 milliGRAM(s) IV Push every 4 hours PRN Severe Pain (7 - 10)  ondansetron Injectable 4 milliGRAM(s) IV Push every 8 hours PRN Nausea and/or Vomiting      PHYSICAL EXAM:  Vital Signs Last 24 Hrs  T(C): 36.4 (25 Apr 2023 10:10), Max: 37.1 (24 Apr 2023 12:31)  T(F): 97.6 (25 Apr 2023 10:10), Max: 98.8 (24 Apr 2023 12:31)  HR: 76 (25 Apr 2023 10:10) (71 - 87)  BP: 122/83 (25 Apr 2023 10:10) (112/71 - 126/78)  BP(mean): --  RR: 19 (25 Apr 2023 10:10) (16 - 19)  SpO2: 100% (25 Apr 2023 10:10) (99% - 100%)    Parameters below as of 25 Apr 2023 10:10  Patient On (Oxygen Delivery Method): room air        CONSTITUTIONAL: NAD, well-developed, well-groomed  RESPIRATORY: Normal respiratory effort; lungs are clear to auscultation bilaterally  CARDIOVASCULAR: Regular rate and rhythm, normal S1 and S2, no murmur/rub/gallop; No lower extremity edema  GASTROINTESTINAL: Nontender to palpation, normoactive bowel sounds, no rebound/guarding; No hepatosplenomegaly  MUSCULOSKELETAL:  no clubbing or cyanosis of digits; no joint swelling or tenderness to palpation  NEUROLOGY: non-focal; no gross sensory deficits   PSYCH: A+O to person, place, and time; affect appropriate  SKIN: No rashes; warm     LABS:                        10.0   5.20  )-----------( 406      ( 25 Apr 2023 06:29 )             31.3     04-25    138  |  104  |  7   ----------------------------<  90  4.5   |  20<L>  |  0.62    Ca    10.0      25 Apr 2023 06:29  Phos  3.3     04-25  Mg     2.00     04-25    TPro  6.6  /  Alb  3.6  /  TBili  <0.2  /  DBili  x   /  AST  21  /  ALT  28  /  AlkPhos  94  04-25    PT/INR - ( 24 Apr 2023 14:30 )   PT: 13.2 sec;   INR: 1.14 ratio         PTT - ( 24 Apr 2023 14:30 )  PTT:38.7 sec            RADIOLOGY & ADDITIONAL TESTS:  Results Reviewed:   Imaging Personally Reviewed:  Electrocardiogram Personally Reviewed:    COORDINATION OF CARE:  Care Discussed with Consultants/Other Providers [Y/N]:  Prior or Outpatient Records Reviewed [Y/N]:  
[facility] Division of Hospital Medicine  Yaya Ellis MD  Pager (M-F, 8A-5P): [phone]  Other Times:  [phone]    Patient is a 73y old  Female who presents with a chief complaint of Diarrhea with hematochezia, ulcerative colitis flare (02 May 2023 09:15)    SUBJECTIVE / OVERNIGHT EVENTS:  Diarrhea seems to be improving - 2-3 this morning. No F/C, N/V, CP, SOB, Cough, lightheadedness, dizziness, abdominal pain, dysuria.    MEDICATIONS  (STANDING):  amLODIPine   Tablet 2.5 milliGRAM(s) Oral daily  aspirin enteric coated 81 milliGRAM(s) Oral daily  enoxaparin Injectable 40 milliGRAM(s) SubCutaneous every 24 hours  levETIRAcetam 500 milliGRAM(s) Oral two times a day  mesalamine DR Capsule 2400 milliGRAM(s) Oral daily  simvastatin 20 milliGRAM(s) Oral at bedtime  vancomycin    Solution 125 milliGRAM(s) Oral every 6 hours    MEDICATIONS  (PRN):  acetaminophen     Tablet .. 650 milliGRAM(s) Oral every 6 hours PRN Temp greater or equal to 38C (100.4F), Mild Pain (1 - 3), Moderate Pain (4 - 6)  aluminum hydroxide/magnesium hydroxide/simethicone Suspension 30 milliLiter(s) Oral every 4 hours PRN Dyspepsia  fluticasone propionate 50 MICROgram(s)/spray Nasal Spray 1 Spray(s) Both Nostrils <User Schedule> PRN congestion  melatonin 3 milliGRAM(s) Oral at bedtime PRN Insomnia  ondansetron Injectable 4 milliGRAM(s) IV Push every 8 hours PRN Nausea and/or Vomiting      Vital Signs Last 24 Hrs  T(C): 36.8 (02 May 2023 06:00), Max: 37 (01 May 2023 21:20)  T(F): 98.2 (02 May 2023 06:00), Max: 98.6 (01 May 2023 21:20)  HR: 72 (02 May 2023 06:00) (72 - 87)  BP: 112/72 (02 May 2023 06:00) (95/57 - 112/72)  BP(mean): --  RR: 17 (02 May 2023 06:00) (17 - 18)  SpO2: 100% (02 May 2023 06:00) (100% - 100%)    Parameters below as of 02 May 2023 06:00  Patient On (Oxygen Delivery Method): room air      CAPILLARY BLOOD GLUCOSE        I&O's Summary    01 May 2023 07:01  -  02 May 2023 07:00  --------------------------------------------------------  IN: 720 mL / OUT: 0 mL / NET: 720 mL    02 May 2023 07:01  -  02 May 2023 11:39  --------------------------------------------------------  IN: 120 mL / OUT: 0 mL / NET: 120 mL        PHYSICAL EXAM:  CONSTITUTIONAL: NAD  EYES: PERRLA; conjunctiva and sclera clear  ENMT: Moist oral mucosa, no pharyngeal injection or exudates; normal dentition  NECK: Supple, no palpable masses; no thyromegaly  RESPIRATORY: Normal respiratory effort; lungs are clear to auscultation bilaterally  CARDIOVASCULAR: Regular rate and rhythm, normal S1 and S2, no murmur/rub/gallop; No lower extremity edema; Peripheral pulses are 2+ bilaterally  ABDOMEN: Nontender to palpation, normoactive bowel sounds, no rebound/guarding; No hepatosplenomegaly  MUSCULOSKELETAL:  Normal gait; no clubbing or cyanosis of digits; no joint swelling or tenderness to palpation  PSYCH: A+O to person, place, and time; affect appropriate  NEUROLOGY: CN 2-12 are intact and symmetric; no gross sensory deficits   SKIN: No rashes; no palpable lesions    LABS:                        9.8    7.32  )-----------( 371      ( 02 May 2023 05:30 )             30.3     05-02    139  |  106  |  10  ----------------------------<  93  3.8   |  22  |  0.69    Ca    9.6      02 May 2023 05:30  Phos  3.2     05-02  Mg     2.10     05-02                RADIOLOGY & ADDITIONAL TESTS:    Imaging Personally Reviewed:    Care Discussed with Consultants/Other Providers:

## 2023-05-02 NOTE — PROGRESS NOTE ADULT - REASON FOR ADMISSION
Diarrhea with hematochezia, ulcerative colitis flare

## 2023-05-02 NOTE — PROGRESS NOTE ADULT - ASSESSMENT
Impressions:     #Hematochezia  #Diarrhea  #Ulcerative colitis  Dx > 10 yrs ago, currently on Mesalamine TID, unknown dose, previously tried sulfasalazine, responded well. Never been on biologic treatment. Has responded to steroids in the past. Used to follow with Dr. Cole, her last colonoscopy in 2016, colitis from the transverse colon to the rectum. Path positive for active colitis. Now transitioned care to Dr. Susana Corbett in [facility]. Currently, developed diarrhea one month ago, progressively worse w/ blood, has fecal urgency.   - CT A/P showed colitis from the TC to the rectum.   - CRP 37 on admission, trending down.  - Hep surface Ab positive, Ag neg. Quant TB neg.  - C diff positive, oral vancomycin (4/27 - ). She was started on IV steroids on 4/27 which is being held now. Symptoms have improved.     Recommendations:   - Please continue with oral vancomycin, will need to complete a total of 10 day course.   - Steroids on hold for now as she is clinically improving.   - DVT PPx as IBD patients are high risk for thrombosis.   - Avoid NSAIDs and opioids.   - ESR and CRP daily.   - Recommend patient to follow up with outpatient GI within 2 weeks, will need to consider starting her on a biologic therapy.     Thank you for the consult. Please call us back if you have any questions or concerns.     All recommendations are tentative until note is attested by an attending.     Vanita Whitfield, PGY-4  Gastroenterology/Hepatology Fellow  Available on Microsoft Teams  [phone] (Short Range Pager)  [phone] (Long Range Pager)    After 5pm, please contact the on-call GI fellow. [phone]

## 2023-05-02 NOTE — PROGRESS NOTE ADULT - PROBLEM SELECTOR PLAN 3
RESOLVED  - EKG non-ischemic, trops negative   - TTE with nl LV and RV function   - no events on telemetry, will discontinue
reports ~30lbs weight loss over the past 1-2 months  - likely 2/2 active UC  - registered dietician order placed  - further outpatient work up for her weight loss
RESOLVED  - EKG non-ischemic, trops negative   - TTE with nl LV and RV function   - no events on telemetry, will discontinue

## 2023-05-02 NOTE — DISCHARGE NOTE NURSING/CASE MANAGEMENT/SOCIAL WORK - NSDCPEFALRISK_GEN_ALL_CORE
For information on Fall & Injury Prevention, visit: [email or web address] OR  [email or web address] OR  https://www.cdc.gov/steadi/patient.html

## 2023-05-02 NOTE — PROGRESS NOTE ADULT - PROBLEM SELECTOR PLAN 4
reports ~30lbs weight loss over the past 1-2 months  - likely 2/2 active UC  - registered dietician order placed  - further outpatient work up for her weight loss
reports ~30lbs weight loss over the past 1-2 months  - likely 2/2 active UC  - registered dietician order placed  - further outpatient work up for her weight loss
reports episodes of AMS for 1-2 seconds, has been evaluated by her neurologist with an EEG that did not show acute findings as per patient  - neuro recs appreciated - c/w Keppra
reports episodes of AMS for 1-2 seconds, has been evaluated by her neurologist with an EEG that did not show acute findings as per patient  - c/w keppra, monitor for episodes of AMS
reports ~30lbs weight loss over the past 1-2 months  - likely 2/2 active UC  - registered dietician order placed  - further outpatient work up for her weight loss
reports episodes of AMS for 1-2 seconds, has been evaluated by her neurologist with an EEG that did not show acute findings as per patient  - neuro recs appreciated - c/w Keppra
reports ~30lbs weight loss over the past 1-2 months  - likely 2/2 active UC  - registered dietician order placed  - further outpatient work up for her weight loss

## 2023-05-02 NOTE — PROGRESS NOTE ADULT - PROBLEM SELECTOR PLAN 6
BP stable  - c/w amlodipine  - monitor vital signs
DVT ppx: lovenox  DIET: Low residue diet  DISPO: PT luis
BP stable  - c/w amlodipine  - monitor vital signs
BP stable  - c/w amlodipine  - monitor vital signs
DVT ppx: lovenox  DIET: NPO for flex sig   DISPO: PT eval
DVT ppx: lovenox  DIET: DASH  DISPO: PT eval
BP stable  - c/w amlodipine  - monitor vital signs

## 2023-05-02 NOTE — PROGRESS NOTE ADULT - PROBLEM SELECTOR PROBLEM 5
Essential hypertension
Seizure disorder
Essential hypertension
Essential hypertension
Seizure disorder

## 2023-05-02 NOTE — DISCHARGE NOTE NURSING/CASE MANAGEMENT/SOCIAL WORK - PATIENT PORTAL LINK FT
You can access the FollowMyHealth Patient Portal offered by [facility] by registering at the following website: [email or web address]. By joining [employer]’s FollowMyHealth portal, you will also be able to view your health information using other applications (apps) compatible with our system.

## 2023-05-02 NOTE — PROGRESS NOTE ADULT - PROBLEM SELECTOR PROBLEM 3
Abnormal weight loss
Chest pain
Abnormal weight loss
Chest pain
Chest pain
Abnormal weight loss
Chest pain

## 2023-05-02 NOTE — PROGRESS NOTE ADULT - PROVIDER SPECIALTY LIST ADULT
Anesthesia
Gastroenterology
Hospitalist
Gastroenterology
Hospitalist

## 2023-05-02 NOTE — PROGRESS NOTE ADULT - PROBLEM SELECTOR PROBLEM 4
Abnormal weight loss
Abnormal weight loss
Seizure disorder
Seizure disorder
Abnormal weight loss
Seizure disorder
Abnormal weight loss

## 2023-05-02 NOTE — PROGRESS NOTE ADULT - PROBLEM SELECTOR PROBLEM 1
Acute ulcerative colitis
Acute ulcerative colitis
C. difficile colitis
Acute ulcerative colitis
Acute ulcerative colitis
C. difficile colitis
C. difficile colitis

## 2023-05-02 NOTE — PROVIDER CONTACT NOTE (OTHER) - BACKGROUND
Patient admitted for diarrhea. PMH anemia, ulcerative colitis, HTN.
Patient admitted for diarrhea. PMH anemia, ulcerative colitis, HTN.

## 2023-05-02 NOTE — PROGRESS NOTE ADULT - PROBLEM SELECTOR PLAN 1
C diff PCR positive on 4/27  improving with oral vanco  c/p PO vancomycin 125 q6h through 5/7  - anticipate discharge to home in next 24 hours.

## 2023-11-15 ENCOUNTER — NON-APPOINTMENT (OUTPATIENT)
Age: 73
End: 2023-11-15

## 2023-11-16 ENCOUNTER — APPOINTMENT (OUTPATIENT)
Dept: OTOLARYNGOLOGY | Facility: CLINIC | Age: 73
End: 2023-11-16
Payer: MEDICARE

## 2023-11-16 VITALS
HEART RATE: 76 BPM | BODY MASS INDEX: 27.49 KG/M2 | WEIGHT: 161 LBS | SYSTOLIC BLOOD PRESSURE: 123 MMHG | HEIGHT: 64 IN | DIASTOLIC BLOOD PRESSURE: 80 MMHG

## 2023-11-16 DIAGNOSIS — H91.93 UNSPECIFIED HEARING LOSS, BILATERAL: ICD-10-CM

## 2023-11-16 DIAGNOSIS — K21.9 GASTRO-ESOPHAGEAL REFLUX DISEASE W/OUT ESOPHAGITIS: ICD-10-CM

## 2023-11-16 PROCEDURE — 99204 OFFICE O/P NEW MOD 45 MIN: CPT | Mod: 25

## 2023-11-16 PROCEDURE — 31575 DIAGNOSTIC LARYNGOSCOPY: CPT

## 2023-12-05 ENCOUNTER — APPOINTMENT (OUTPATIENT)
Dept: OTOLARYNGOLOGY | Facility: CLINIC | Age: 73
End: 2023-12-05
Payer: MEDICARE

## 2023-12-05 VITALS
WEIGHT: 161 LBS | HEIGHT: 64 IN | BODY MASS INDEX: 27.49 KG/M2 | TEMPERATURE: 97.7 F | SYSTOLIC BLOOD PRESSURE: 138 MMHG | HEART RATE: 80 BPM | OXYGEN SATURATION: 99 % | DIASTOLIC BLOOD PRESSURE: 87 MMHG

## 2023-12-05 DIAGNOSIS — H61.893 OTHER SPECIFIED DISORDERS OF EXTERNAL EAR, BILATERAL: ICD-10-CM

## 2023-12-05 DIAGNOSIS — H93.293 OTHER ABNORMAL AUDITORY PERCEPTIONS, BILATERAL: ICD-10-CM

## 2023-12-05 DIAGNOSIS — H90.3 SENSORINEURAL HEARING LOSS, BILATERAL: ICD-10-CM

## 2023-12-05 PROCEDURE — 92557 COMPREHENSIVE HEARING TEST: CPT

## 2023-12-05 PROCEDURE — 92567 TYMPANOMETRY: CPT

## 2023-12-05 PROCEDURE — 99213 OFFICE O/P EST LOW 20 MIN: CPT

## 2023-12-07 PROBLEM — H93.293 OTHER ABNORMAL AUDITORY PERCEPTIONS, BILATERAL: Status: ACTIVE | Noted: 2023-12-07

## 2023-12-07 PROBLEM — H61.893 DRY BOTH EAR CANALS: Status: ACTIVE | Noted: 2023-12-07

## 2024-04-19 NOTE — DISCHARGE NOTE NURSING/CASE MANAGEMENT/SOCIAL WORK - NURSING SECTION COMPLETE
HCC coding opportunities       Chart reviewed, no opportunity found: CHART REVIEWED, NO OPPORTUNITY FOUND        Patients Insurance     Medicare Insurance: Medicare           Patient/Caregiver provided printed discharge information.

## 2025-01-07 NOTE — DIETITIAN INITIAL EVALUATION ADULT - PROBLEM SELECTOR PROBLEM 3
Attempted to contact patient , no answer. Left detailed message on voicemail to call back.   Abnormal weight loss

## 2025-04-21 NOTE — DIETITIAN INITIAL EVALUATION ADULT - ADD RECOMMEND
Patient is a 76 yo F w/ asthma (chronic methypred 8mg PO daily), bronchiectasis, allergic rhinitis, recurrent PNA, SDB ( on CPAP), tracheomalacia s/p tracheoplasty, tracheobronchomalacia, pAfib (Eliquis), colorectal ca s/p colostomy, aortic dissection s/p ascending aorta repair who p/w AMS and hallucinations x 1 week as well as acute on chronic SOB x 2 weeks. As per patient and daughter at bedside, hallucinations started on Friday. Was instructed to obtain urine sample but was unable to. Patient also had been without her chest vest as awaiting a new one that arrive just earlier this week and so has not been able to do her usual airway clearance regimen. Endorses reduced ET over the past 5 days. Denies any fevers.    CT chest notable for mild bilateral interlobular septal thickening, trace left effusion as well as scattered bilateral sub-6 mm pulmonary nodules    Home Airway clearance regimen: Albuterol q6h -> Chest Vest -> Perforomist BID  -> Pulmicort BID, - > Ohtuvayre 2.5 BID - > HyperSal 7% q12h. Patient also on Breo Ellipta 200 at 1 inhalation QD, Incruse Ellipta 1 puff QD, and on Tezspire airway     #SOB - Likely 2/2 mild COPD/asthma exacerbation  #Acute Asthma exacerbation  #SDB - on CPAP  - c/w Airway clearance regimen as possible Albuterol q6h -> Chest Vest -> Perforomist BID (patient brought in) -> Pulmicort BID, - > Ohtuvayre 2.5 BID (patient brought in) - > HyperSal 7% q12h  - Patient also on Breo Ellipta 200 at 1 inhalation QD, Incruse Ellipta 1 puff QD. Can use Advair/Spiriva while admitted  - Tezspire outpatient   - c/w home CPAP and Chest Vest for use  - Can decrease steroids back to home 8mg Methylpred daily  - PT/OT, OOB to chair as much as possible  - Patient to follow up with Dr. Allison on discharge    Shaan Shah MD  Pulmonary & Critical Care     Encourage po fluids.  Honor food and beverage preferences within diet restriction of patient in an effort to maximize level of nutrient intake   Monitor PO intake, tolerance to nutrition supplement, weight trends, nutrition related lab values, BMs/GI distress, hydration status, skin integrity.

## 2025-04-28 NOTE — H&P ADULT - HIV OFFER
Note from OV 7/1/24: Pt presents with abdominal pain. Pain has been present for 3 months. Pain is located in the epigastsrum. Pain occurs after eating and lasts for 20-30 minutes. Pain is described as sharp. She denies any heartburn or N/V. She does report some intermittent dysphagia for solids. Bowels are moving every day. No constipation or diarrhea.  EGD 4/25/22 showed esophagitis and surgical changes consistent with previous gastric bypass.  Colonoscopoy 4/25/22 was normal. Random colon biopsies were normal. --------------------------------------------- Note from OV 8/5/24: EGD 7/12/24 was normal  Epigastric pain has improved significantly with increased dose of omeprazole. Pain has nearly resolved and continues to improve. She has no new GI complaints. ------------------------------------------------ Note from OV 3/31/25: Pt c/o diarrhea. Diarrhea has been present for 3 months. They are having 5-10 BMs/day. Stool is loose to watery. Associated symptoms include lower abdominal cramping that is relieved by BM. No new medications or recent Abx use.   Colonoscopy 7/25/22 was normal. Random colon biopsies were normal. -----------------------------------------------
Opt out

## 2025-06-20 NOTE — CHART NOTE - NSCHARTNOTESELECT_GEN_ALL_CORE
Event Note [No Acute Distress] : no acute distress [Well Nourished] : well nourished [Well Developed] : well developed [Well-Appearing] : well-appearing [Normal Sclera/Conjunctiva] : normal sclera/conjunctiva [PERRL] : pupils equal round and reactive to light [EOMI] : extraocular movements intact [Normal Outer Ear/Nose] : the outer ears and nose were normal in appearance [Normal Oropharynx] : the oropharynx was normal [No JVD] : no jugular venous distention [No Lymphadenopathy] : no lymphadenopathy [Supple] : supple [Thyroid Normal, No Nodules] : the thyroid was normal and there were no nodules present [No Respiratory Distress] : no respiratory distress  [No Accessory Muscle Use] : no accessory muscle use [Clear to Auscultation] : lungs were clear to auscultation bilaterally [Normal Rate] : normal rate  [Regular Rhythm] : with a regular rhythm [Normal S1, S2] : normal S1 and S2 [No Murmur] : no murmur heard [No Carotid Bruits] : no carotid bruits [No Abdominal Bruit] : a ~M bruit was not heard ~T in the abdomen [No Varicosities] : no varicosities [Pedal Pulses Present] : the pedal pulses are present [No Edema] : there was no peripheral edema [No Palpable Aorta] : no palpable aorta [No Extremity Clubbing/Cyanosis] : no extremity clubbing/cyanosis [No Masses] : no abdominal mass palpated [Normal Bowel Sounds] : normal bowel sounds [Normal Posterior Cervical Nodes] : no posterior cervical lymphadenopathy [Normal Anterior Cervical Nodes] : no anterior cervical lymphadenopathy [No CVA Tenderness] : no CVA  tenderness [No Spinal Tenderness] : no spinal tenderness [No Joint Swelling] : no joint swelling [Grossly Normal Strength/Tone] : grossly normal strength/tone [No Rash] : no rash [Coordination Grossly Intact] : coordination grossly intact [No Focal Deficits] : no focal deficits [Normal Gait] : normal gait [Deep Tendon Reflexes (DTR)] : deep tendon reflexes were 2+ and symmetric [Normal Affect] : the affect was normal [Normal Insight/Judgement] : insight and judgment were intact [de-identified] : TTP mesogastric

## 2025-07-01 NOTE — PROGRESS NOTE ADULT - PROBLEM SELECTOR PLAN 7
To confirm, Your doctor has instructed you that surgery is scheduled for:  Thursday 7/3/25    Endoscopy  will call the afternoon prior to surgery with the final arrival time.      Please report to Outpatient Registration the morning of surgery.     Do not eat anything after midnight the night before your surgery - You may have water  up to 2 hours before coming in for the procedure.      Follow the preop given by the Doctor    YOU MAY BRUSH YOUR TEETH BUT DO NOT SWALLOW     TAKE ONLY THESE MEDICATIONS WITH A SMALL SIP OF WATER THE MORNING OF YOUR PROCEDURE: see medication list    PLEASE NOTE:  The surgery schedule has many variables which may affect the time of your surgery case.  Family members should be available if your surgery time changes.  Plan to be here the day of your procedure between 2-3 hours.    DO NOT TAKE THESE MEDICATIONS 5-7 DAYS PRIOR to your procedure or per your surgeon's request: ASPIRIN, ALEVE, ADVIL, IBUPROFEN,  IGNACIO SELTZER, BC , FISH OIL , VITAMIN E, HERBALS  (May take Tylenol)        IMPORTANT INSTRUCTIONS    Do not smoke, vape or drink alcoholic beverages 24 hours prior to your procedure.  Shower the night before with  Dial antibacterial soap from the neck down.   You may use your own shampoo and face wash. This helps your skin to be as bacteria free as possible.    If you wear contact lenses, dentures, hearing aids or glasses, bring a container to put them in during surgery and give to a family member for safe keeping.    Please leave all jewelry, piercing's and valuables at home.   ONLY if you wear home oxygen please bring your portable oxygen tank the day of your procedure.   ONLY for patients requiring bowel prep, written instructions will be given by your doctor's office.  Make arrangements in advance for transportation home by a responsible adult.  You must make arrangements for transportation, TAXI'S, UBER'S OR LYFTS ARE NOT ALLOWED.        If you have any questions about these  instructions, call the Endoscopy Department at [phone]      Check B12, Folate, TIBC  -monitor H&H outpt w/u with mammogram and US

## (undated) DEVICE — CONTAINER FORMALIN 80ML YELLOW

## (undated) DEVICE — SALIVA EJECTOR (BLUE)

## (undated) DEVICE — CLAMP BX HOT RAD JAW 3

## (undated) DEVICE — CATH IV SAFE BC 22G X 1" (BLUE)

## (undated) DEVICE — LUBRICATING JELLY HR ONE SHOT 3G

## (undated) DEVICE — TUBING MEDI-VAC W MAXIGRIP CONNECTORS 1/4"X6'

## (undated) DEVICE — BIOPSY FORCEP RADIAL JAW 4 STANDARD WITH NEEDLE

## (undated) DEVICE — BASIN EMESIS 10IN GRADUATED MAUVE

## (undated) DEVICE — DRSG 2X2

## (undated) DEVICE — TUBING IV SET GRAVITY 3Y 100" MACRO

## (undated) DEVICE — LINE BREATHE SAMPLNG

## (undated) DEVICE — ELCTR GROUNDING PAD ADULT COVIDIEN

## (undated) DEVICE — ELCTR ECG CONDUCTIVE ADHESIVE

## (undated) DEVICE — TUBING SUCTION NONCONDUCTIVE 6MM X 12FT

## (undated) DEVICE — DRSG BANDAID 0.75X3"

## (undated) DEVICE — BIOPSY FORCEP COLD DISP

## (undated) DEVICE — PACK IV START WITH CHG

## (undated) DEVICE — DRSG CURITY GAUZE SPONGE 4 X 4" 12-PLY NON-STERILE

## (undated) DEVICE — GOWN LG